# Patient Record
Sex: MALE | Race: WHITE | NOT HISPANIC OR LATINO
[De-identification: names, ages, dates, MRNs, and addresses within clinical notes are randomized per-mention and may not be internally consistent; named-entity substitution may affect disease eponyms.]

---

## 2018-09-14 ENCOUNTER — APPOINTMENT (OUTPATIENT)
Dept: ORTHOPEDIC SURGERY | Facility: CLINIC | Age: 83
End: 2018-09-14
Payer: MEDICARE

## 2018-09-14 DIAGNOSIS — Z78.9 OTHER SPECIFIED HEALTH STATUS: ICD-10-CM

## 2018-09-14 PROCEDURE — 73030 X-RAY EXAM OF SHOULDER: CPT | Mod: LT

## 2018-09-14 PROCEDURE — 99203 OFFICE O/P NEW LOW 30 MIN: CPT

## 2020-03-24 ENCOUNTER — APPOINTMENT (OUTPATIENT)
Dept: ORTHOPEDIC SURGERY | Facility: CLINIC | Age: 85
End: 2020-03-24

## 2020-05-14 ENCOUNTER — APPOINTMENT (OUTPATIENT)
Dept: ORTHOPEDIC SURGERY | Facility: CLINIC | Age: 85
End: 2020-05-14

## 2020-05-21 ENCOUNTER — APPOINTMENT (OUTPATIENT)
Dept: ORTHOPEDIC SURGERY | Facility: CLINIC | Age: 85
End: 2020-05-21
Payer: MEDICARE

## 2020-05-21 VITALS
BODY MASS INDEX: 26.68 KG/M2 | WEIGHT: 166 LBS | SYSTOLIC BLOOD PRESSURE: 161 MMHG | HEART RATE: 58 BPM | HEIGHT: 66 IN | DIASTOLIC BLOOD PRESSURE: 62 MMHG

## 2020-05-21 VITALS — TEMPERATURE: 98.9 F

## 2020-05-21 DIAGNOSIS — Z82.61 FAMILY HISTORY OF ARTHRITIS: ICD-10-CM

## 2020-05-21 DIAGNOSIS — M54.12 RADICULOPATHY, CERVICAL REGION: ICD-10-CM

## 2020-05-21 PROCEDURE — 72040 X-RAY EXAM NECK SPINE 2-3 VW: CPT

## 2020-05-21 PROCEDURE — 99214 OFFICE O/P EST MOD 30 MIN: CPT

## 2020-05-21 NOTE — PHYSICAL EXAM
[Poor Appearance] : well-appearing [Acute Distress] : not in acute distress [de-identified] : CONSTITUTIONAL: Patient is a very pleasant individual who is well-nourished and appears stated age. \par PSYCHIATRIC: Alert and oriented times three and in no apparent distress, and participates with orthopedic evaluation well.\par HEAD: Atraumatic and nonsyndromic in appearance.\par EENT: No thyromegaly, EOMI.\par RESPIRATORY: Respiratory rate is regular, not dyspneic on examination.\par LYMPHATICS: There is no cervical or axillary lymphadenopathy.\par INTEGUMENTARY: Skin is clean, dry, and intact about the bilateral upper extremities and cervical spine. \par VASCULAR: There is brisk capillary refill about the bilateral upper extremities and radial pulses are 2/4. \par NEUROLOGIC: Negative L'hirmitte, negative Spurling’s sign. There are no pathologic reflexes. There is no decrease in sensation of the bilateral upper extremities on Wartenberg pinwheel examination. Deep tendon reflexes are well-maintained at +2/4 of the bilateral upper extremities and are symmetric.\par MUSCULOSKELETAL: There is no visible muscular atrophy. Manual motor strength is well maintained in the bilateral upper extremities. Cervical range of motion is well maintained. The patient ambulates in a non-myelopathic manner. Normal secondary orthopaedic exam of bilateral shoulders, elbows and hands. Elbow flexion and extension, wrist extension, finger flexion and abduction are well maintained. \par \par subjective c/o b/l hand numbness, R>L. b/l trapezius and posterior cervical myositis, R>L. [de-identified] : X-ray of the cervical spine taken today shows age appropriate cervical spondylosis, for age 88, with C5-C6 with what appears to be an autofusion.

## 2020-05-21 NOTE — HISTORY OF PRESENT ILLNESS
[de-identified] : 88 year old M presents with cervical spine pain. He had quadruple bypass in 1980's and stents in the 1990's. He c/o intermittent b/l hand numbness / tingling. He also c/o trapezius pain. He takes Tylenol Arthritis, which does not provide much relief. No c/o weakness or being off-balance. [Ataxia] : no ataxia [Incontinence] : no incontinence [Loss of Dexterity] : good dexterity [Urinary Ret.] : no urinary retention

## 2020-05-21 NOTE — ADDENDUM
[FreeTextEntry1] : Documented by Sohan Fowler acting as a scribe for Dr. Masoud Rosenthal on 05/21/2020 . All medical record entries made by the Scribe were at my, Dr. Masoud Rosenthal, direction and personally dictated by me on 05/21/2020 . I have reviewed the chart and agree that the record accurately reflects my personal performance of the history, physical exam, assessment and plan. I have also personally directed, reviewed, and agreed with the chart.

## 2020-05-21 NOTE — DISCUSSION/SUMMARY
[de-identified] : I have recommended that the pt continue with a conservative treatment plan. Pt has been referred to physical therapy for decreased pain modalities, core strengthening modalities, soft tissue modalities, and physical modalities. A cervical MRI has been ordered and is medically necessary due to persistent pain and subjective c/o hand numbness. MRI will help guide treatment plan, possible surgical intervention vs injection therapy with pain management. The patient will follow up after the MRI results have been obtained / 1 month.

## 2020-06-23 ENCOUNTER — APPOINTMENT (OUTPATIENT)
Dept: ORTHOPEDIC SURGERY | Facility: CLINIC | Age: 85
End: 2020-06-23
Payer: MEDICARE

## 2020-06-23 VITALS
DIASTOLIC BLOOD PRESSURE: 71 MMHG | HEART RATE: 58 BPM | WEIGHT: 166 LBS | SYSTOLIC BLOOD PRESSURE: 152 MMHG | HEIGHT: 66 IN | BODY MASS INDEX: 26.68 KG/M2

## 2020-06-23 VITALS — TEMPERATURE: 97.1 F

## 2020-06-23 DIAGNOSIS — Z86.39 PERSONAL HISTORY OF OTHER ENDOCRINE, NUTRITIONAL AND METABOLIC DISEASE: ICD-10-CM

## 2020-06-23 DIAGNOSIS — M75.102 UNSPECIFIED ROTATOR CUFF TEAR OR RUPTURE OF LEFT SHOULDER, NOT SPECIFIED AS TRAUMATIC: ICD-10-CM

## 2020-06-23 DIAGNOSIS — M47.812 SPONDYLOSIS W/OUT MYELOPATHY OR RADICULOPATHY, CERVICAL REGION: ICD-10-CM

## 2020-06-23 DIAGNOSIS — M75.22 BICIPITAL TENDINITIS, LEFT SHOULDER: ICD-10-CM

## 2020-06-23 PROCEDURE — 99214 OFFICE O/P EST MOD 30 MIN: CPT

## 2020-06-23 NOTE — HISTORY OF PRESENT ILLNESS
[de-identified] : 88 year old M presents with cervical spine pain, MRI review. He states he is still in pain. He complains of a lump, probably a lipoma. He has low back pain when he wakes up in the morning, better after he gets up and starts moving. Hx of Melanoma. [Ataxia] : no ataxia [Incontinence] : no incontinence [Loss of Dexterity] : good dexterity [Urinary Ret.] : no urinary retention

## 2020-06-23 NOTE — PHYSICAL EXAM
[Poor Appearance] : well-appearing [Acute Distress] : not in acute distress [de-identified] : CONSTITUTIONAL: Patient is a very pleasant individual who is well-nourished and appears stated age. \par PSYCHIATRIC: Alert and oriented times three and in no apparent distress, and participates with orthopedic evaluation well.\par HEAD: Atraumatic and nonsyndromic in appearance.\par EENT: No thyromegaly, EOMI.\par RESPIRATORY: Respiratory rate is regular, not dyspneic on examination.\par LYMPHATICS: There is no cervical or axillary lymphadenopathy.\par INTEGUMENTARY: Skin is clean, dry, and intact about the bilateral upper extremities and cervical spine. \par VASCULAR: There is brisk capillary refill about the bilateral upper extremities and radial pulses are 2/4. \par NEUROLOGIC: Negative L'hirmitte, negative Spurling’s sign. There are no pathologic reflexes. There is no decrease in sensation of the bilateral upper extremities on Wartenberg pinwheel examination. Deep tendon reflexes are well-maintained at +2/4 of the bilateral upper extremities and are symmetric.\par MUSCULOSKELETAL: There is no visible muscular atrophy. Manual motor strength is well maintained in the bilateral upper extremities. Cervical range of motion is well maintained. The patient ambulates in a non-myelopathic manner. Normal secondary orthopaedic exam of bilateral shoulders, elbows and hands. Elbow flexion and extension, wrist extension, finger flexion and abduction are well maintained. \par \par cervical myositis. Mechanically orientated low back pain.  [de-identified] : MRI  from 5/29/2020 of the cervical spine shows age appropriate spondylosis. No evidence of myelopathy.

## 2020-06-23 NOTE — ADDENDUM
[FreeTextEntry1] : Documented by Sohan Fowler acting as a scribe for Dr. Masoud Rosenthal on 06/23/2020 . All medical record entries made by the Scribe were at my, Dr. Masoud Rosenthal, direction and personally dictated by me on 06/23/2020 . I have reviewed the chart and agree that the record accurately reflects my personal performance of the history, physical exam, assessment and plan. I have also personally directed, reviewed, and agreed with the chart.

## 2020-08-06 ENCOUNTER — APPOINTMENT (OUTPATIENT)
Dept: PULMONOLOGY | Facility: CLINIC | Age: 85
End: 2020-08-06
Payer: MEDICARE

## 2020-08-06 VITALS
HEART RATE: 65 BPM | SYSTOLIC BLOOD PRESSURE: 130 MMHG | BODY MASS INDEX: 29.43 KG/M2 | HEIGHT: 62.5 IN | WEIGHT: 164 LBS | OXYGEN SATURATION: 96 % | DIASTOLIC BLOOD PRESSURE: 70 MMHG

## 2020-08-06 VITALS — RESPIRATION RATE: 16 BRPM

## 2020-08-06 DIAGNOSIS — Z87.891 PERSONAL HISTORY OF NICOTINE DEPENDENCE: ICD-10-CM

## 2020-08-06 PROCEDURE — 99204 OFFICE O/P NEW MOD 45 MIN: CPT

## 2020-08-06 RX ORDER — DICLOFENAC SODIUM 10 MG/G
1 GEL TOPICAL DAILY
Qty: 1 | Refills: 0 | Status: DISCONTINUED | COMMUNITY
Start: 2020-06-03 | End: 2020-08-06

## 2020-08-06 NOTE — PHYSICAL EXAM
[No Acute Distress] : no acute distress [Normal Appearance] : normal appearance [Normal Oropharynx] : normal oropharynx [No Neck Mass] : no neck mass [Normal S1, S2] : normal s1, s2 [Normal Rate/Rhythm] : normal rate/rhythm [No Murmurs] : no murmurs [No Resp Distress] : no resp distress [Benign] : benign [No Abnormalities] : no abnormalities [Clear to Auscultation Bilaterally] : clear to auscultation bilaterally [Normal Gait] : normal gait [No Cyanosis] : no cyanosis [No Clubbing] : no clubbing [No Edema] : no edema [FROM] : FROM [Normal Color/ Pigmentation] : normal color/ pigmentation [No Focal Deficits] : no focal deficits [Oriented x3] : oriented x3 [Normal Affect] : normal affect

## 2020-08-06 NOTE — REASON FOR VISIT
[Consultation] : a consultation [Abnormal CXR/ Chest CT] : an abnormal CXR/ chest CT [Spouse] : spouse

## 2020-08-06 NOTE — CONSULT LETTER
[Dear  ___] : Dear  [unfilled], [Consult Letter:] : I had the pleasure of evaluating your patient, [unfilled]. [Please see my note below.] : Please see my note below. [Sincerely,] : Sincerely, [FreeTextEntry3] : Marnie Henry MD FCCP\par D-ABSM\par ABIM board certified in  Pulmonary diseases, Sleep medicine\par Internal medicine\par  [Consult Closing:] : Thank you very much for allowing me to participate in the care of this patient.  If you have any questions, please do not hesitate to contact me.

## 2020-08-06 NOTE — ASSESSMENT
[FreeTextEntry1] : The patient does not have a lung nodule. I believe the density that was seen on the chest x-ray was a projection of some bandlike atelectasis at the left base which may be a sequela of his coronary artery bypass surgery in the past. I reassured the patient regarding this. Followup here p.r.n.

## 2020-08-06 NOTE — HISTORY OF PRESENT ILLNESS
[TextBox_4] : The patient is an 88-year-old male who comes for evaluation of an abnormal chest x-ray. The patient feels well and is a nonsmoker. He had coronary artery bypass surgery 23 years ago. Her chest x-ray recently was performed and demonstrated a 13 mm left lung nodule. He had a followup CT scan done. There's been no shortness of breath cough or wheeze. Has been no fevers chills night sweats or weight loss.

## 2021-03-24 ENCOUNTER — EMERGENCY (EMERGENCY)
Facility: HOSPITAL | Age: 86
LOS: 1 days | Discharge: HOME | End: 2021-03-24
Attending: EMERGENCY MEDICINE
Payer: MEDICARE

## 2021-03-24 VITALS
RESPIRATION RATE: 20 BRPM | SYSTOLIC BLOOD PRESSURE: 166 MMHG | TEMPERATURE: 99 F | HEART RATE: 82 BPM | DIASTOLIC BLOOD PRESSURE: 73 MMHG | OXYGEN SATURATION: 97 %

## 2021-03-24 VITALS
HEIGHT: 67 IN | SYSTOLIC BLOOD PRESSURE: 157 MMHG | OXYGEN SATURATION: 95 % | RESPIRATION RATE: 20 BRPM | DIASTOLIC BLOOD PRESSURE: 58 MMHG | HEART RATE: 65 BPM | TEMPERATURE: 98 F

## 2021-03-24 DIAGNOSIS — Z95.1 PRESENCE OF AORTOCORONARY BYPASS GRAFT: Chronic | ICD-10-CM

## 2021-03-24 DIAGNOSIS — Z95.5 PRESENCE OF CORONARY ANGIOPLASTY IMPLANT AND GRAFT: Chronic | ICD-10-CM

## 2021-03-24 LAB
ALBUMIN SERPL ELPH-MCNC: 4.1 G/DL — SIGNIFICANT CHANGE UP (ref 3.3–5.2)
ALP SERPL-CCNC: 54 U/L — SIGNIFICANT CHANGE UP (ref 40–120)
ALT FLD-CCNC: 12 U/L — SIGNIFICANT CHANGE UP
ANION GAP SERPL CALC-SCNC: 14 MMOL/L — SIGNIFICANT CHANGE UP (ref 5–17)
APTT BLD: 28.4 SEC — SIGNIFICANT CHANGE UP (ref 27.5–35.5)
AST SERPL-CCNC: 16 U/L — SIGNIFICANT CHANGE UP
BASOPHILS # BLD AUTO: 0.01 K/UL — SIGNIFICANT CHANGE UP (ref 0–0.2)
BASOPHILS NFR BLD AUTO: 0.2 % — SIGNIFICANT CHANGE UP (ref 0–2)
BILIRUB SERPL-MCNC: 0.4 MG/DL — SIGNIFICANT CHANGE UP (ref 0.4–2)
BUN SERPL-MCNC: 14 MG/DL — SIGNIFICANT CHANGE UP (ref 8–20)
CALCIUM SERPL-MCNC: 9.3 MG/DL — SIGNIFICANT CHANGE UP (ref 8.6–10.2)
CHLORIDE SERPL-SCNC: 100 MMOL/L — SIGNIFICANT CHANGE UP (ref 98–107)
CO2 SERPL-SCNC: 22 MMOL/L — SIGNIFICANT CHANGE UP (ref 22–29)
CREAT SERPL-MCNC: 0.83 MG/DL — SIGNIFICANT CHANGE UP (ref 0.5–1.3)
CRP SERPL-MCNC: <4 MG/L — SIGNIFICANT CHANGE UP
D DIMER BLD IA.RAPID-MCNC: <150 NG/ML DDU — SIGNIFICANT CHANGE UP
EOSINOPHIL # BLD AUTO: 0.02 K/UL — SIGNIFICANT CHANGE UP (ref 0–0.5)
EOSINOPHIL NFR BLD AUTO: 0.3 % — SIGNIFICANT CHANGE UP (ref 0–6)
FERRITIN SERPL-MCNC: 107 NG/ML — SIGNIFICANT CHANGE UP (ref 30–400)
GLUCOSE BLDC GLUCOMTR-MCNC: 153 MG/DL — HIGH (ref 70–99)
GLUCOSE SERPL-MCNC: 126 MG/DL — HIGH (ref 70–99)
HCT VFR BLD CALC: 44.7 % — SIGNIFICANT CHANGE UP (ref 39–50)
HGB BLD-MCNC: 14.6 G/DL — SIGNIFICANT CHANGE UP (ref 13–17)
IMM GRANULOCYTES NFR BLD AUTO: 0.3 % — SIGNIFICANT CHANGE UP (ref 0–1.5)
INR BLD: 1.19 RATIO — HIGH (ref 0.88–1.16)
LDH SERPL L TO P-CCNC: 170 U/L — SIGNIFICANT CHANGE UP (ref 98–192)
LYMPHOCYTES # BLD AUTO: 1.77 K/UL — SIGNIFICANT CHANGE UP (ref 1–3.3)
LYMPHOCYTES # BLD AUTO: 30.3 % — SIGNIFICANT CHANGE UP (ref 13–44)
MCHC RBC-ENTMCNC: 29.6 PG — SIGNIFICANT CHANGE UP (ref 27–34)
MCHC RBC-ENTMCNC: 32.7 GM/DL — SIGNIFICANT CHANGE UP (ref 32–36)
MCV RBC AUTO: 90.7 FL — SIGNIFICANT CHANGE UP (ref 80–100)
MONOCYTES # BLD AUTO: 0.53 K/UL — SIGNIFICANT CHANGE UP (ref 0–0.9)
MONOCYTES NFR BLD AUTO: 9.1 % — SIGNIFICANT CHANGE UP (ref 2–14)
NEUTROPHILS # BLD AUTO: 3.49 K/UL — SIGNIFICANT CHANGE UP (ref 1.8–7.4)
NEUTROPHILS NFR BLD AUTO: 59.8 % — SIGNIFICANT CHANGE UP (ref 43–77)
PLATELET # BLD AUTO: 211 K/UL — SIGNIFICANT CHANGE UP (ref 150–400)
POTASSIUM SERPL-MCNC: 3.9 MMOL/L — SIGNIFICANT CHANGE UP (ref 3.5–5.3)
POTASSIUM SERPL-SCNC: 3.9 MMOL/L — SIGNIFICANT CHANGE UP (ref 3.5–5.3)
PROCALCITONIN SERPL-MCNC: 0.05 NG/ML — SIGNIFICANT CHANGE UP (ref 0.02–0.1)
PROT SERPL-MCNC: 7.4 G/DL — SIGNIFICANT CHANGE UP (ref 6.6–8.7)
PROTHROM AB SERPL-ACNC: 13.7 SEC — HIGH (ref 10.6–13.6)
RBC # BLD: 4.93 M/UL — SIGNIFICANT CHANGE UP (ref 4.2–5.8)
RBC # FLD: 13.2 % — SIGNIFICANT CHANGE UP (ref 10.3–14.5)
SARS-COV-2 RNA SPEC QL NAA+PROBE: DETECTED
SODIUM SERPL-SCNC: 135 MMOL/L — SIGNIFICANT CHANGE UP (ref 135–145)
TROPONIN T SERPL-MCNC: <0.01 NG/ML — SIGNIFICANT CHANGE UP (ref 0–0.06)
WBC # BLD: 5.84 K/UL — SIGNIFICANT CHANGE UP (ref 3.8–10.5)
WBC # FLD AUTO: 5.84 K/UL — SIGNIFICANT CHANGE UP (ref 3.8–10.5)

## 2021-03-24 PROCEDURE — 36415 COLL VENOUS BLD VENIPUNCTURE: CPT

## 2021-03-24 PROCEDURE — 86140 C-REACTIVE PROTEIN: CPT

## 2021-03-24 PROCEDURE — 99218: CPT

## 2021-03-24 PROCEDURE — U0003: CPT

## 2021-03-24 PROCEDURE — 83615 LACTATE (LD) (LDH) ENZYME: CPT

## 2021-03-24 PROCEDURE — 93010 ELECTROCARDIOGRAM REPORT: CPT

## 2021-03-24 PROCEDURE — U0005: CPT

## 2021-03-24 PROCEDURE — 84145 PROCALCITONIN (PCT): CPT

## 2021-03-24 PROCEDURE — 94640 AIRWAY INHALATION TREATMENT: CPT

## 2021-03-24 PROCEDURE — 85610 PROTHROMBIN TIME: CPT

## 2021-03-24 PROCEDURE — 80053 COMPREHEN METABOLIC PANEL: CPT

## 2021-03-24 PROCEDURE — 83880 ASSAY OF NATRIURETIC PEPTIDE: CPT

## 2021-03-24 PROCEDURE — 93005 ELECTROCARDIOGRAM TRACING: CPT

## 2021-03-24 PROCEDURE — 85025 COMPLETE CBC W/AUTO DIFF WBC: CPT

## 2021-03-24 PROCEDURE — 85379 FIBRIN DEGRADATION QUANT: CPT

## 2021-03-24 PROCEDURE — 84484 ASSAY OF TROPONIN QUANT: CPT

## 2021-03-24 PROCEDURE — 71045 X-RAY EXAM CHEST 1 VIEW: CPT

## 2021-03-24 PROCEDURE — 99285 EMERGENCY DEPT VISIT HI MDM: CPT | Mod: 25

## 2021-03-24 PROCEDURE — 96361 HYDRATE IV INFUSION ADD-ON: CPT

## 2021-03-24 PROCEDURE — 96374 THER/PROPH/DIAG INJ IV PUSH: CPT

## 2021-03-24 PROCEDURE — 71045 X-RAY EXAM CHEST 1 VIEW: CPT | Mod: 26

## 2021-03-24 PROCEDURE — 96375 TX/PRO/DX INJ NEW DRUG ADDON: CPT

## 2021-03-24 PROCEDURE — 82962 GLUCOSE BLOOD TEST: CPT

## 2021-03-24 PROCEDURE — G0378: CPT

## 2021-03-24 PROCEDURE — 82728 ASSAY OF FERRITIN: CPT

## 2021-03-24 PROCEDURE — 85730 THROMBOPLASTIN TIME PARTIAL: CPT

## 2021-03-24 RX ORDER — DEXTROSE 50 % IN WATER 50 %
15 SYRINGE (ML) INTRAVENOUS ONCE
Refills: 0 | Status: DISCONTINUED | OUTPATIENT
Start: 2021-03-24 | End: 2021-03-28

## 2021-03-24 RX ORDER — SODIUM CHLORIDE 9 MG/ML
1000 INJECTION, SOLUTION INTRAVENOUS
Refills: 0 | Status: DISCONTINUED | OUTPATIENT
Start: 2021-03-24 | End: 2021-03-28

## 2021-03-24 RX ORDER — ATORVASTATIN CALCIUM 80 MG/1
20 TABLET, FILM COATED ORAL AT BEDTIME
Refills: 0 | Status: DISCONTINUED | OUTPATIENT
Start: 2021-03-24 | End: 2021-03-28

## 2021-03-24 RX ORDER — BRIMONIDINE TARTRATE 2 MG/MG
1 SOLUTION/ DROPS OPHTHALMIC EVERY 12 HOURS
Refills: 0 | Status: DISCONTINUED | OUTPATIENT
Start: 2021-03-24 | End: 2021-03-28

## 2021-03-24 RX ORDER — ASPIRIN/CALCIUM CARB/MAGNESIUM 324 MG
81 TABLET ORAL DAILY
Refills: 0 | Status: DISCONTINUED | OUTPATIENT
Start: 2021-03-24 | End: 2021-03-28

## 2021-03-24 RX ORDER — INSULIN LISPRO 100/ML
VIAL (ML) SUBCUTANEOUS
Refills: 0 | Status: DISCONTINUED | OUTPATIENT
Start: 2021-03-24 | End: 2021-03-28

## 2021-03-24 RX ORDER — TIMOLOL 0.5 %
1 DROPS OPHTHALMIC (EYE) DAILY
Refills: 0 | Status: DISCONTINUED | OUTPATIENT
Start: 2021-03-24 | End: 2021-03-28

## 2021-03-24 RX ORDER — GLUCAGON INJECTION, SOLUTION 0.5 MG/.1ML
1 INJECTION, SOLUTION SUBCUTANEOUS ONCE
Refills: 0 | Status: DISCONTINUED | OUTPATIENT
Start: 2021-03-24 | End: 2021-03-28

## 2021-03-24 RX ORDER — DEXAMETHASONE 0.5 MG/5ML
6 ELIXIR ORAL ONCE
Refills: 0 | Status: COMPLETED | OUTPATIENT
Start: 2021-03-24 | End: 2021-03-24

## 2021-03-24 RX ORDER — ACETAMINOPHEN 500 MG
650 TABLET ORAL EVERY 6 HOURS
Refills: 0 | Status: DISCONTINUED | OUTPATIENT
Start: 2021-03-24 | End: 2021-03-28

## 2021-03-24 RX ORDER — DEXTROSE 50 % IN WATER 50 %
25 SYRINGE (ML) INTRAVENOUS ONCE
Refills: 0 | Status: DISCONTINUED | OUTPATIENT
Start: 2021-03-24 | End: 2021-03-28

## 2021-03-24 RX ORDER — DEXTROSE 50 % IN WATER 50 %
12.5 SYRINGE (ML) INTRAVENOUS ONCE
Refills: 0 | Status: DISCONTINUED | OUTPATIENT
Start: 2021-03-24 | End: 2021-03-28

## 2021-03-24 RX ORDER — ALBUTEROL 90 UG/1
2 AEROSOL, METERED ORAL ONCE
Refills: 0 | Status: COMPLETED | OUTPATIENT
Start: 2021-03-24 | End: 2021-03-24

## 2021-03-24 RX ORDER — GUAIFENESIN/DEXTROMETHORPHAN 600MG-30MG
10 TABLET, EXTENDED RELEASE 12 HR ORAL ONCE
Refills: 0 | Status: COMPLETED | OUTPATIENT
Start: 2021-03-24 | End: 2021-03-24

## 2021-03-24 RX ORDER — ASPIRIN/CALCIUM CARB/MAGNESIUM 324 MG
325 TABLET ORAL ONCE
Refills: 0 | Status: COMPLETED | OUTPATIENT
Start: 2021-03-24 | End: 2021-03-24

## 2021-03-24 RX ORDER — LOSARTAN POTASSIUM 100 MG/1
50 TABLET, FILM COATED ORAL DAILY
Refills: 0 | Status: DISCONTINUED | OUTPATIENT
Start: 2021-03-24 | End: 2021-03-28

## 2021-03-24 RX ORDER — SODIUM CHLORIDE 9 MG/ML
1000 INJECTION INTRAMUSCULAR; INTRAVENOUS; SUBCUTANEOUS ONCE
Refills: 0 | Status: COMPLETED | OUTPATIENT
Start: 2021-03-24 | End: 2021-03-24

## 2021-03-24 RX ORDER — ACETAMINOPHEN 500 MG
650 TABLET ORAL ONCE
Refills: 0 | Status: COMPLETED | OUTPATIENT
Start: 2021-03-24 | End: 2021-03-24

## 2021-03-24 RX ORDER — MORPHINE SULFATE 50 MG/1
2 CAPSULE, EXTENDED RELEASE ORAL ONCE
Refills: 0 | Status: DISCONTINUED | OUTPATIENT
Start: 2021-03-24 | End: 2021-03-24

## 2021-03-24 RX ADMIN — MORPHINE SULFATE 2 MILLIGRAM(S): 50 CAPSULE, EXTENDED RELEASE ORAL at 16:27

## 2021-03-24 RX ADMIN — MORPHINE SULFATE 2 MILLIGRAM(S): 50 CAPSULE, EXTENDED RELEASE ORAL at 14:54

## 2021-03-24 RX ADMIN — SODIUM CHLORIDE 1000 MILLILITER(S): 9 INJECTION INTRAMUSCULAR; INTRAVENOUS; SUBCUTANEOUS at 12:00

## 2021-03-24 RX ADMIN — Medication 10 MILLILITER(S): at 12:14

## 2021-03-24 RX ADMIN — SODIUM CHLORIDE 1000 MILLILITER(S): 9 INJECTION INTRAMUSCULAR; INTRAVENOUS; SUBCUTANEOUS at 13:00

## 2021-03-24 RX ADMIN — Medication 6 MILLIGRAM(S): at 12:00

## 2021-03-24 RX ADMIN — Medication 1 DROP(S): at 20:09

## 2021-03-24 RX ADMIN — Medication 650 MILLIGRAM(S): at 12:00

## 2021-03-24 RX ADMIN — ALBUTEROL 2 PUFF(S): 90 AEROSOL, METERED ORAL at 12:14

## 2021-03-24 RX ADMIN — Medication 325 MILLIGRAM(S): at 14:54

## 2021-03-24 RX ADMIN — Medication 650 MILLIGRAM(S): at 14:11

## 2021-03-24 NOTE — ED CDU PROVIDER DISPOSITION NOTE - ATTENDING CONTRIBUTION TO CARE
I, Grace Kebede, performed a face to face bedside interview with this patient regarding history of present illness, review of symptoms and relevant past medical, social and family history.  I completed an independent physical examination. Medical decision making, follow-up on ordered tests (ie labs, radiologic studies) and re-evaluation of the patient's status has been communicated to the ACP.  Disposition of the patient will be based on test outcome and response to ED interventions.     patient seen and cleared by cards had  recent echo and stress. trop x3 negative, age adjusted d-dimer negative. will d/c

## 2021-03-24 NOTE — ED CDU PROVIDER DISPOSITION NOTE - PATIENT PORTAL LINK FT
You can access the FollowMyHealth Patient Portal offered by [facility] by registering at the following website: [email or web address]. By joining [facility]’s FollowMyHealth portal, you will also be able to view your health information using other applications (apps) compatible with our system.

## 2021-03-24 NOTE — ED CDU PROVIDER DISPOSITION NOTE - CLINICAL COURSE
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 Covid positive as of 3/15, tested positive again at urgent care 3/21. Pt put in obs for serial enzymes and cardiology eval. Trop negative x 3. Pt seen by Dr. Mackey while in obs, states pt is cleared from cardiology perspective, can f/u in office in 2 weeks once covid symptoms resolve. I discussed all results and plan with pt and pt's daughter Destiny via phone who understand and agree. Pt given copy of all results. Return precautions discussed, stable for dc home. Ambulance arranged for transport home.

## 2021-03-24 NOTE — ED PROVIDER NOTE - OBJECTIVE STATEMENT
The patient is a 88 year old male presents with SOB, cough and FINNEGAN with recent history of COVID-19 The patient is a 88 year old male presents with SOB, cough and FINNEGAN with recent history of COVID-19.  The patient has history of CAD with CABG and HTN

## 2021-03-24 NOTE — ED CDU PROVIDER DISPOSITION NOTE - NSFOLLOWUPINSTRUCTIONS_ED_ALL_ED_FT
Follow up with the outpatient detox resources we provided to you   - Follow up with your doctor within 2-3 days.   - Return to the ED for any new or worsening symptoms.   - Please follow-up with [facility], Dr. Mackey within 2 weeks for further evaluation  - Continue all prescribed medications as directed    Shortness of breath    Shortness of breath (dyspnea) means you have trouble breathing and could indicate a medical problem. Causes include lung disease, heart disease, low amount of red blood cells (anemia), poor physical fitness, being overweight, smoking, etc. Your health care provider today may not be able to find a cause for your shortness of breath after your exam. In this case, it is important to have a follow-up exam with your primary care physician as instructed. If medicines were prescribed, take them as directed for the full length of time directed. Refrain from tobacco products.    SEEK IMMEDIATE MEDICAL CARE IF YOU HAVE ANY OF THE FOLLOWING SYMPTOMS: worsening shortness of breath, chest pain, back pain, abdominal pain, fever, coughing up blood, lightheadedness/dizziness.

## 2021-03-24 NOTE — ED ADULT TRIAGE NOTE - CHIEF COMPLAINT QUOTE
pt BIBA from home a&ox3, no acute distress, breaths even and unlabored c/o worsening sob and chest tightness since last night. tested +covid 11 days ago. presents with spo2 95% on RA.

## 2021-03-24 NOTE — ED PROVIDER NOTE - CLINICAL SUMMARY MEDICAL DECISION MAKING FREE TEXT BOX
The patient presents with FINNEGAN and EKG change and will place in ED observation and called [facility]

## 2021-03-24 NOTE — ED ADULT NURSE REASSESSMENT NOTE - NS ED NURSE REASSESS COMMENT FT1
Late Note  Pt denies cough, denies fever. Pt was given staff personal cell phones multiple times and spoke with family. Pt c/o stretcher being uncomfortable and was made comfortable, given pillow, numerous urinals, needs met and safety maintained.

## 2021-03-24 NOTE — CONSULT NOTE ADULT - SUBJECTIVE AND OBJECTIVE BOX
[facility], [facility]                                                    [address], [address], [place] [address]                                                         PHONE: [phone]    FAX: [phone] [address], [address], [place] [address]                                                 PHONE: [phone]    FAX: [phone]  *******************************************************************************    Reason for Consult: Palpitations    HPI:  CRISTÓBAL YEE is a 88y Male with h/o CAD s/p CABG '95 with subsequent PCI/stent RCA with patent LIMA->LAD & occluded ANTON->RCA at that time, normal LV fxn, prolonged 1st degree AVB, ascending thoracic aortic aneurysm, PVD with moderate carotid stenosis, DM, HTN, HL presents with chest pain & SOB.  Patient reports recent symptoms consistent with his yearly bronchitis.  He went to urgent care and had a negative CXR but was called 2 days ago that he was COVID positive.  Today he got lightheaded upon getting up quickly.  He then developed bilateral chest tightness beneath his breasts followed by some SOB so he came to the ER.  He is currently chest pain free.  No current SOB, palpitations, LH, syncope, orthopnea, PND, LE edema.  No fever, chlls or cough.    PAST MEDICAL & SURGICAL HISTORY:  MI, old    Diabetes    High cholesterol    History of coronary artery stent placement    S/P CABG (coronary artery bypass graft)        No Known Allergies      MEDICATIONS  (STANDING):  aspirin enteric coated 81 milliGRAM(s) Oral daily  atorvastatin 20 milliGRAM(s) Oral at bedtime  brimonidine 0.2% Ophthalmic Solution 1 Drop(s) Both EYES every 12 hours  dextrose 40% Gel 15 Gram(s) Oral once  dextrose 5%. 1000 milliLiter(s) (50 mL/Hr) IV Continuous <Continuous>  dextrose 5%. 1000 milliLiter(s) (100 mL/Hr) IV Continuous <Continuous>  dextrose 50% Injectable 25 Gram(s) IV Push once  dextrose 50% Injectable 12.5 Gram(s) IV Push once  dextrose 50% Injectable 25 Gram(s) IV Push once  glucagon  Injectable 1 milliGRAM(s) IntraMuscular once  insulin lispro (ADMELOG) corrective regimen sliding scale   SubCutaneous three times a day before meals  losartan 50 milliGRAM(s) Oral daily  timolol 0.5% Solution 1 Drop(s) Both EYES daily    MEDICATIONS  (PRN):  acetaminophen   Tablet .. 650 milliGRAM(s) Oral every 6 hours PRN Temp greater or equal to 38C (100.4F), Moderate Pain (4 - 6)      Social History: no active tobacco (very distant), no EtOH / IVDA    Family History: negative for early heart disease    ROS: As noted above, otherwise unremarkable.    Vital Signs Last 24 Hrs  T(C): 36.7 (24 Mar 2021 10:19), Max: 36.7 (24 Mar 2021 10:19)  T(F): 98.1 (24 Mar 2021 10:19), Max: 98.1 (24 Mar 2021 10:19)  HR: 67 (24 Mar 2021 15:07) (65 - 67)  BP: 177/79 (24 Mar 2021 15:07) (157/58 - 177/79)  BP(mean): --  RR: 20 (24 Mar 2021 15:07) (20 - 20)  SpO2: 97% (24 Mar 2021 15:07) (95% - 97%)    I&O's Detail    I&O's Summary          PHYSICAL EXAM:  General: Appears well developed, well nourished, no acute distress  HEENT: Head: normocephalic, atraumatic  Eyes: Pupils equal and reactive  Neck: Supple, no carotid bruit, no JVD, no HJR  CARDIOVASCULAR: Normal S1 and S2, no murmur, rub, or gallop  LUNGS: Clear to auscultation bilaterally, no rales, rhonchi or wheeze  ABDOMEN: Soft, nontender, non-distended, positive bowel sounds, no mass or bruit  EXTREMITIES: No edema, distal pulses WNL  SKIN: Warm and dry with normal turgor  NEURO: Alert & oriented x 3, grossly intact  PSYCH: normal mood and affect    LABS:                        14.6   5.84  )-----------( 211      ( 24 Mar 2021 12:25 )             44.7     03-24    135  |  100  |  14.0  ----------------------------<  126<H>  3.9   |  22.0  |  0.83    Ca    9.3      24 Mar 2021 12:25    TPro  7.4  /  Alb  4.1  /  TBili  0.4  /  DBili  x   /  AST  16  /  ALT  12  /  AlkPhos  54  03-24    CARDIAC MARKERS ( 24 Mar 2021 15:23 )  x     / <0.01 ng/mL / x     / x     / x      CARDIAC MARKERS ( 24 Mar 2021 12:25 )  x     / <0.01 ng/mL / x     / x     / x                RADIOLOGY & ADDITIONAL STUDIES:    CXR (3/24/21): no acute cardiopulmonary disease process, cardiomegaly    ECG: NSR @ 88 bpm, prolonged 1st degree AVB (296 ms), LAFB, PRWP, IWMI of YAZAN, NSRA    ECHO (8/7/20): normal LV fxn (EF 50-55%), basal inferolateral segment is akinetic & mid inferolateral segment is hypokinetic, diastolic dysfxn, moderate asymmetric LVH, dilatation of aortic root 4.8 cm & ascending aorta 4.8 cm, mild AR, mild MR, mild FL, mild TR, moderate pulmonary HTN    CAROTID DUPLEX SCAN (8/7/20): moderate 50-65% stenosis R & L ICA    PERSANTINE NUCLEAR STRESS TEST (2/8/20): medium fixed defect involving basal to mid inferior wall & basal inferolateral wall of mild-moderate intensity c/w prior infarction with no significant residual ischemia, mild hypokinesis of inferoposterolateral wall with remaining walls chemo effectively, EF 53% (no change compared to prior study)      Assessment and Plan:  In summary, CRISTÓBAL YEE is a 88M a/w atypical chest pain (troponins negative) & SOB now resolved, + COVID-19 as of 3/21/21, h/o CAD s/p CABG '95 with subsequent PCI/stent RCA with patent LIMA->LAD & occluded ANTON->RCA at that time, normal LV fxn, 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  Prior Persantine Nuclear stress test 2/8/20 demonstrated a medium fixed defect involving basal to mid inferior wall & basal inferolateral wall of mild-moderate intensity c/w prior infarction with no significant residual ischemia, mild hypokinesis of inferoposterolateral wall with remaining walls chemo effectively, EF 53% (no change compared to prior study).  Can repeat as an outpatient.  - Echocardiogram 8/7/20 demonstrated normal LV fxn (EF 50-55%), basal inferolateral segment is akinetic & mid inferolateral segment is hypokinetic, diastolic dysfxn, moderate asymmetric LVH, dilatation of aortic root 4.8 cm & ascending aorta 4.8 cm, mild AR, mild MR, mild FL, mild TR, moderate pulmonary HTN.  Can repeat as an outpatient.  - Carotid duplex scan 8/7/20 demonstrated moderate 50-65% stenosis R & L ICA  - Rhythm/hemodynamics stable = continue chronic anti-HTN medication (Losartan 50 daily) for now and titrate PRN  - Continue ASA 81 & Simvastatin 80 daily  - Management of COVID-19 per medicine  - Okay for discharge from a cardiac perspective with outpatient follow-up at [facility] in 2 weeks (after recovered from COVID).  Risks, benefits & alternatives discussed with the patient who demonstrates clear understanding and agrees with the current treatment plan.  Please call with any cardiovascular questions/issues.    We will follow with you.  Thank you for allowing me to participate in the care of your patient.      Sincerely,    Denny Mackey MD

## 2021-03-24 NOTE — ED ADULT NURSE NOTE - OBJECTIVE STATEMENT
88 M, nad, alert and oriented x 3 but poor historian reports being sent for getting call for positive Covid test. Pt in no acute distress, breathing even and unlabored, SpO2 97% on room air, full physical assessment deferred to physician, fall precautions in place, will monitor.

## 2021-03-24 NOTE — ED CDU PROVIDER INITIAL DAY NOTE - OBJECTIVE STATEMENT
87yo M with CABG with stents, HTN, HLD, DM today had episode of chest pressure b/l lower chest region nonradiating associated with SOB worse with exertion. no leg swelling.  sx lasted 'awhile' until he got to the hospital. asymptomatic since. patient follows with [facility]. last TTE/stress >1 year ago, saw cardiology last month and told them he had tingling/cold rt hand at times and they did an 'mri of everything' to 'look for blockages' and said it was fine. dx with covid monday on PCR. has had mild intermittent cough which he states every year gets 'bronchitis' so went to urgent care over weekend and had normal CXR negative rapid covid and they called him monday that test was positive

## 2021-03-24 NOTE — ED CDU PROVIDER INITIAL DAY NOTE - MEDICAL DECISION MAKING DETAILS
patient with sudden onset chest tightness/sob, worse with exertion. EKG with new st-t changes V5/V6. will place in obs for serial trop. tte. pending [place] consult

## 2021-03-24 NOTE — ED CDU PROVIDER DISPOSITION NOTE - CARE PROVIDER_API CALL
Denny Mackey)  Cardiology; Internal Medicine  [address]  [place] [address]  Phone: [phone]  Fax: [phone]  Follow Up Time:

## 2021-04-06 ENCOUNTER — APPOINTMENT (OUTPATIENT)
Dept: PULMONOLOGY | Facility: CLINIC | Age: 86
End: 2021-04-06
Payer: MEDICARE

## 2021-04-06 VITALS — OXYGEN SATURATION: 97 % | HEART RATE: 67 BPM | TEMPERATURE: 97.4 F

## 2021-04-06 VITALS — SYSTOLIC BLOOD PRESSURE: 128 MMHG | WEIGHT: 143 LBS | BODY MASS INDEX: 25.74 KG/M2 | DIASTOLIC BLOOD PRESSURE: 66 MMHG

## 2021-04-06 DIAGNOSIS — R93.89 ABNORMAL FINDINGS ON DIAGNOSTIC IMAGING OF OTHER SPECIFIED BODY STRUCTURES: ICD-10-CM

## 2021-04-06 DIAGNOSIS — Z23 ENCOUNTER FOR IMMUNIZATION: ICD-10-CM

## 2021-04-06 DIAGNOSIS — U07.1 COVID-19: ICD-10-CM

## 2021-04-06 PROCEDURE — 99214 OFFICE O/P EST MOD 30 MIN: CPT

## 2021-04-06 PROCEDURE — 99072 ADDL SUPL MATRL&STAF TM PHE: CPT

## 2021-09-05 ENCOUNTER — INPATIENT (INPATIENT)
Facility: HOSPITAL | Age: 86
LOS: 0 days | Discharge: HOME | DRG: 281 | End: 2021-09-06
Attending: INTERNAL MEDICINE | Admitting: STUDENT IN AN ORGANIZED HEALTH CARE EDUCATION/TRAINING PROGRAM
Payer: MEDICARE

## 2021-09-05 VITALS
HEIGHT: 67 IN | DIASTOLIC BLOOD PRESSURE: 67 MMHG | OXYGEN SATURATION: 95 % | HEART RATE: 76 BPM | WEIGHT: 154.98 LBS | RESPIRATION RATE: 18 BRPM | TEMPERATURE: 99 F | SYSTOLIC BLOOD PRESSURE: 171 MMHG

## 2021-09-05 DIAGNOSIS — Z95.1 PRESENCE OF AORTOCORONARY BYPASS GRAFT: Chronic | ICD-10-CM

## 2021-09-05 DIAGNOSIS — I21.4 NON-ST ELEVATION (NSTEMI) MYOCARDIAL INFARCTION: ICD-10-CM

## 2021-09-05 DIAGNOSIS — Z95.5 PRESENCE OF CORONARY ANGIOPLASTY IMPLANT AND GRAFT: Chronic | ICD-10-CM

## 2021-09-05 LAB
ALBUMIN SERPL ELPH-MCNC: 4.2 G/DL — SIGNIFICANT CHANGE UP (ref 3.3–5.2)
ALP SERPL-CCNC: 66 U/L — SIGNIFICANT CHANGE UP (ref 40–120)
ALT FLD-CCNC: 26 U/L — SIGNIFICANT CHANGE UP
ANION GAP SERPL CALC-SCNC: 12 MMOL/L — SIGNIFICANT CHANGE UP (ref 5–17)
APTT BLD: 28.5 SEC — SIGNIFICANT CHANGE UP (ref 27.5–35.5)
AST SERPL-CCNC: 26 U/L — SIGNIFICANT CHANGE UP
BASOPHILS # BLD AUTO: 0.03 K/UL — SIGNIFICANT CHANGE UP (ref 0–0.2)
BASOPHILS NFR BLD AUTO: 0.4 % — SIGNIFICANT CHANGE UP (ref 0–2)
BILIRUB SERPL-MCNC: 0.5 MG/DL — SIGNIFICANT CHANGE UP (ref 0.4–2)
BUN SERPL-MCNC: 22.2 MG/DL — HIGH (ref 8–20)
CALCIUM SERPL-MCNC: 9.8 MG/DL — SIGNIFICANT CHANGE UP (ref 8.6–10.2)
CHLORIDE SERPL-SCNC: 102 MMOL/L — SIGNIFICANT CHANGE UP (ref 98–107)
CO2 SERPL-SCNC: 23 MMOL/L — SIGNIFICANT CHANGE UP (ref 22–29)
CREAT SERPL-MCNC: 0.71 MG/DL — SIGNIFICANT CHANGE UP (ref 0.5–1.3)
EOSINOPHIL # BLD AUTO: 0.1 K/UL — SIGNIFICANT CHANGE UP (ref 0–0.5)
EOSINOPHIL NFR BLD AUTO: 1.2 % — SIGNIFICANT CHANGE UP (ref 0–6)
GLUCOSE BLDC GLUCOMTR-MCNC: 183 MG/DL — HIGH (ref 70–99)
GLUCOSE SERPL-MCNC: 147 MG/DL — HIGH (ref 70–99)
HCT VFR BLD CALC: 42.3 % — SIGNIFICANT CHANGE UP (ref 39–50)
HGB BLD-MCNC: 14.1 G/DL — SIGNIFICANT CHANGE UP (ref 13–17)
IMM GRANULOCYTES NFR BLD AUTO: 0.2 % — SIGNIFICANT CHANGE UP (ref 0–1.5)
INR BLD: 1.1 RATIO — SIGNIFICANT CHANGE UP (ref 0.88–1.16)
LIDOCAIN IGE QN: 31 U/L — SIGNIFICANT CHANGE UP (ref 22–51)
LYMPHOCYTES # BLD AUTO: 2.15 K/UL — SIGNIFICANT CHANGE UP (ref 1–3.3)
LYMPHOCYTES # BLD AUTO: 25.5 % — SIGNIFICANT CHANGE UP (ref 13–44)
MAGNESIUM SERPL-MCNC: 1.9 MG/DL — SIGNIFICANT CHANGE UP (ref 1.8–2.6)
MCHC RBC-ENTMCNC: 31.1 PG — SIGNIFICANT CHANGE UP (ref 27–34)
MCHC RBC-ENTMCNC: 33.3 GM/DL — SIGNIFICANT CHANGE UP (ref 32–36)
MCV RBC AUTO: 93.4 FL — SIGNIFICANT CHANGE UP (ref 80–100)
MONOCYTES # BLD AUTO: 0.57 K/UL — SIGNIFICANT CHANGE UP (ref 0–0.9)
MONOCYTES NFR BLD AUTO: 6.8 % — SIGNIFICANT CHANGE UP (ref 2–14)
NEUTROPHILS # BLD AUTO: 5.57 K/UL — SIGNIFICANT CHANGE UP (ref 1.8–7.4)
NEUTROPHILS NFR BLD AUTO: 65.9 % — SIGNIFICANT CHANGE UP (ref 43–77)
NT-PROBNP SERPL-SCNC: 863 PG/ML — HIGH (ref 0–300)
PLATELET # BLD AUTO: 188 K/UL — SIGNIFICANT CHANGE UP (ref 150–400)
POTASSIUM SERPL-MCNC: 4.5 MMOL/L — SIGNIFICANT CHANGE UP (ref 3.5–5.3)
POTASSIUM SERPL-SCNC: 4.5 MMOL/L — SIGNIFICANT CHANGE UP (ref 3.5–5.3)
PROT SERPL-MCNC: 7.6 G/DL — SIGNIFICANT CHANGE UP (ref 6.6–8.7)
PROTHROM AB SERPL-ACNC: 12.7 SEC — SIGNIFICANT CHANGE UP (ref 10.6–13.6)
RBC # BLD: 4.53 M/UL — SIGNIFICANT CHANGE UP (ref 4.2–5.8)
RBC # FLD: 13.4 % — SIGNIFICANT CHANGE UP (ref 10.3–14.5)
SARS-COV-2 RNA SPEC QL NAA+PROBE: SIGNIFICANT CHANGE UP
SODIUM SERPL-SCNC: 137 MMOL/L — SIGNIFICANT CHANGE UP (ref 135–145)
TROPONIN T SERPL-MCNC: 0.02 NG/ML — SIGNIFICANT CHANGE UP (ref 0–0.06)
TROPONIN T SERPL-MCNC: 0.13 NG/ML — HIGH (ref 0–0.06)
TROPONIN T SERPL-MCNC: 0.16 NG/ML — HIGH (ref 0–0.06)
TROPONIN T SERPL-MCNC: 0.38 NG/ML — HIGH (ref 0–0.06)
WBC # BLD: 8.44 K/UL — SIGNIFICANT CHANGE UP (ref 3.8–10.5)
WBC # FLD AUTO: 8.44 K/UL — SIGNIFICANT CHANGE UP (ref 3.8–10.5)

## 2021-09-05 PROCEDURE — 93010 ELECTROCARDIOGRAM REPORT: CPT

## 2021-09-05 PROCEDURE — 99285 EMERGENCY DEPT VISIT HI MDM: CPT

## 2021-09-05 PROCEDURE — 71046 X-RAY EXAM CHEST 2 VIEWS: CPT | Mod: 26

## 2021-09-05 PROCEDURE — 99223 1ST HOSP IP/OBS HIGH 75: CPT

## 2021-09-05 RX ORDER — LANOLIN ALCOHOL/MO/W.PET/CERES
3 CREAM (GRAM) TOPICAL AT BEDTIME
Refills: 0 | Status: DISCONTINUED | OUTPATIENT
Start: 2021-09-05 | End: 2021-09-06

## 2021-09-05 RX ORDER — HEPARIN SODIUM 5000 [USP'U]/ML
4100 INJECTION INTRAVENOUS; SUBCUTANEOUS EVERY 6 HOURS
Refills: 0 | Status: DISCONTINUED | OUTPATIENT
Start: 2021-09-05 | End: 2021-09-06

## 2021-09-05 RX ORDER — SODIUM CHLORIDE 9 MG/ML
1000 INJECTION, SOLUTION INTRAVENOUS
Refills: 0 | Status: DISCONTINUED | OUTPATIENT
Start: 2021-09-05 | End: 2021-09-06

## 2021-09-05 RX ORDER — DEXTROSE 50 % IN WATER 50 %
25 SYRINGE (ML) INTRAVENOUS ONCE
Refills: 0 | Status: DISCONTINUED | OUTPATIENT
Start: 2021-09-05 | End: 2021-09-06

## 2021-09-05 RX ORDER — ASPIRIN/CALCIUM CARB/MAGNESIUM 324 MG
81 TABLET ORAL DAILY
Refills: 0 | Status: DISCONTINUED | OUTPATIENT
Start: 2021-09-05 | End: 2021-09-06

## 2021-09-05 RX ORDER — INSULIN LISPRO 100/ML
VIAL (ML) SUBCUTANEOUS
Refills: 0 | Status: DISCONTINUED | OUTPATIENT
Start: 2021-09-05 | End: 2021-09-06

## 2021-09-05 RX ORDER — GLUCAGON INJECTION, SOLUTION 0.5 MG/.1ML
1 INJECTION, SOLUTION SUBCUTANEOUS ONCE
Refills: 0 | Status: DISCONTINUED | OUTPATIENT
Start: 2021-09-05 | End: 2021-09-06

## 2021-09-05 RX ORDER — SIMVASTATIN 20 MG/1
80 TABLET, FILM COATED ORAL AT BEDTIME
Refills: 0 | Status: DISCONTINUED | OUTPATIENT
Start: 2021-09-05 | End: 2021-09-06

## 2021-09-05 RX ORDER — DEXTROSE 50 % IN WATER 50 %
12.5 SYRINGE (ML) INTRAVENOUS ONCE
Refills: 0 | Status: DISCONTINUED | OUTPATIENT
Start: 2021-09-05 | End: 2021-09-06

## 2021-09-05 RX ORDER — ALPRAZOLAM 0.25 MG
0.25 TABLET ORAL ONCE
Refills: 0 | Status: DISCONTINUED | OUTPATIENT
Start: 2021-09-05 | End: 2021-09-06

## 2021-09-05 RX ORDER — ASPIRIN/CALCIUM CARB/MAGNESIUM 324 MG
162 TABLET ORAL ONCE
Refills: 0 | Status: COMPLETED | OUTPATIENT
Start: 2021-09-05 | End: 2021-09-05

## 2021-09-05 RX ORDER — LOSARTAN POTASSIUM 100 MG/1
50 TABLET, FILM COATED ORAL DAILY
Refills: 0 | Status: DISCONTINUED | OUTPATIENT
Start: 2021-09-05 | End: 2021-09-06

## 2021-09-05 RX ORDER — HEPARIN SODIUM 5000 [USP'U]/ML
4100 INJECTION INTRAVENOUS; SUBCUTANEOUS ONCE
Refills: 0 | Status: COMPLETED | OUTPATIENT
Start: 2021-09-05 | End: 2021-09-05

## 2021-09-05 RX ORDER — TIMOLOL 0.5 %
1 DROPS OPHTHALMIC (EYE) DAILY
Refills: 0 | Status: DISCONTINUED | OUTPATIENT
Start: 2021-09-05 | End: 2021-09-06

## 2021-09-05 RX ORDER — ACETAMINOPHEN 500 MG
650 TABLET ORAL EVERY 6 HOURS
Refills: 0 | Status: DISCONTINUED | OUTPATIENT
Start: 2021-09-05 | End: 2021-09-06

## 2021-09-05 RX ORDER — DEXTROSE 50 % IN WATER 50 %
15 SYRINGE (ML) INTRAVENOUS ONCE
Refills: 0 | Status: DISCONTINUED | OUTPATIENT
Start: 2021-09-05 | End: 2021-09-06

## 2021-09-05 RX ORDER — PANTOPRAZOLE SODIUM 20 MG/1
40 TABLET, DELAYED RELEASE ORAL
Refills: 0 | Status: DISCONTINUED | OUTPATIENT
Start: 2021-09-05 | End: 2021-09-06

## 2021-09-05 RX ORDER — HEPARIN SODIUM 5000 [USP'U]/ML
INJECTION INTRAVENOUS; SUBCUTANEOUS
Qty: 25000 | Refills: 0 | Status: DISCONTINUED | OUTPATIENT
Start: 2021-09-05 | End: 2021-09-06

## 2021-09-05 RX ORDER — HYDRALAZINE HCL 50 MG
10 TABLET ORAL ONCE
Refills: 0 | Status: COMPLETED | OUTPATIENT
Start: 2021-09-05 | End: 2021-09-05

## 2021-09-05 RX ORDER — FINASTERIDE 5 MG/1
5 TABLET, FILM COATED ORAL DAILY
Refills: 0 | Status: DISCONTINUED | OUTPATIENT
Start: 2021-09-05 | End: 2021-09-06

## 2021-09-05 RX ADMIN — SIMVASTATIN 80 MILLIGRAM(S): 20 TABLET, FILM COATED ORAL at 22:00

## 2021-09-05 RX ADMIN — Medication 30 MILLILITER(S): at 16:28

## 2021-09-05 RX ADMIN — Medication 162 MILLIGRAM(S): at 08:36

## 2021-09-05 RX ADMIN — HEPARIN SODIUM 4100 UNIT(S): 5000 INJECTION INTRAVENOUS; SUBCUTANEOUS at 18:30

## 2021-09-05 RX ADMIN — HEPARIN SODIUM 800 UNIT(S)/HR: 5000 INJECTION INTRAVENOUS; SUBCUTANEOUS at 18:36

## 2021-09-05 RX ADMIN — Medication 10 MILLIGRAM(S): at 14:22

## 2021-09-05 RX ADMIN — Medication 1: at 18:03

## 2021-09-05 RX ADMIN — Medication 30 MILLILITER(S): at 08:36

## 2021-09-05 NOTE — H&P ADULT - NSICDXPASTSURGICALHX_GEN_ALL_CORE_FT
PAST SURGICAL HISTORY:  History of coronary artery stent placement     S/P CABG (coronary artery bypass graft)

## 2021-09-05 NOTE — ED PROVIDER NOTE - CLINICAL SUMMARY MEDICAL DECISION MAKING FREE TEXT BOX
88yo male with extensive cardiac history of CAD s/p CABG 1995 with PCI, ascending thoracic aortic aneurysm, DM, HTN, prolonged 1st degree AV block presenting with epigastric pain radiating to his left chest with shortness of breath starting at 2AM this morning with symptoms resolving after 4 hours. 1st degree AV block seen on EKG without acute ST changes compared to old EKG with unremarkable physical exam with lungs clear bilaterally concerning for but not limited to ACS, GERD, esophageal spasm. Will order labs and imaging and reassess.

## 2021-09-05 NOTE — ED ADULT NURSE NOTE - OBJECTIVE STATEMENT
"felt a burning pain in my left chest. felt like my stomach reflux that I get all the time but worse." pt reports pain resolved pta. pt has no other complaints at this time. a and o x3. breathing even and unlabored. nsr on cm. will continue to monitor.

## 2021-09-05 NOTE — ED ADULT TRIAGE NOTE - CHIEF COMPLAINT QUOTE
patient states that he has been having heartburn chest pain and burping a lot over the past 2 days states "I just don't feel right"

## 2021-09-05 NOTE — H&P ADULT - NSHPPHYSICALEXAM_GEN_ALL_CORE
GENERAL: pt examined bedside, laying comfortably in bed in NAD   HEENT: NC/AT, moist oral mucosa, clear conjunctiva, sclera nonicteric  RESPIRATORY: Normal respiratory effort; CTA b/l, no wheezing, rhonchi, rales  CARDIOVASCULAR: RRR, normal S1 and S2  ABDOMEN: soft, NT/ND, normoactive bowel sounds, no rebound/guarding  MSK: No joint deformities, edema, erythema  EXTREMITIES: No cynaosis, no clubbing, no lower extremity edema; Peripheral pulses are 2+ bilaterally  PSYCH: anxious affect and intermittently agitated   NEUROLOGY: A+O to person, place, and time, no focal neurologic deficits appreciated   SKIN: No rashes or no palpable lesions

## 2021-09-05 NOTE — ED PROVIDER NOTE - ATTENDING CONTRIBUTION TO CARE
HPI: 88yo male with PMH Coronary Artery Disease s/p CABG, DM, HTN, 1st degree AV block presenting with epigastric/L chest wall pain a/w shortness of breath starting at 2AM. No associated numbness/tingling/weakness. Now asymptomatic.     PE:  Gen: NAD  Head: NCAT  HEENT: Oral mucosa moist, normal conjunctiva  CV: RRR no murmurs, normal perfusion  Resp: CTA b/l, no wheezing, rales, rhonchi, no respiratory distress  GI: Abd Soft NTND  Neuro: No focal neuro deficits  MSK: FROM all 4 extremities, no deformity  Skin: No rash, no bruising  Psych: Normal affect    MDM: Chest pain x 4 hours, now resolved, EKG no ischemic changes, trop neg. D/w patient's cardiologist ([facility])- recommending outpatient f/u. Ioana Way DO         I performed a history and physical exam of the patient and discussed their management with the resident. I reviewed the resident's note and agree with the documented findings and plan of care. My medical decision making and observations are found above.

## 2021-09-05 NOTE — ED ADULT NURSE NOTE - NSIMPLEMENTINTERV_GEN_ALL_ED
Implemented All Fall with Harm Risk Interventions:  [place] to call system. Call bell, personal items and telephone within reach. Instruct patient to call for assistance. Room bathroom lighting operational. Non-slip footwear when patient is off stretcher. Physically safe environment: no spills, clutter or unnecessary equipment. Stretcher in lowest position, wheels locked, appropriate side rails in place. Provide visual cue, wrist band, yellow gown, etc. Monitor gait and stability. Monitor for mental status changes and reorient to person, place, and time. Review medications for side effects contributing to fall risk. Reinforce activity limits and safety measures with patient and family. Provide visual clues: red socks.

## 2021-09-05 NOTE — ED PROVIDER NOTE - OBJECTIVE STATEMENT
89yo male with history of CAD s/p CABG 1995 with PCI, ascending thoracic aortic aneurysm, DM, HTN, prolonged 1st degree AV block presenting with epigastric pain radiating to his left chest with shortness of breath starting at 2AM this morning lasting 4 hours without syncope, nausea, vomiting, headache, back pain or focal neurologic symptoms. Patient states he is now asymptomatic. Was previously treated for GERD with omeprazole.

## 2021-09-05 NOTE — H&P ADULT - NSHPLABSRESULTS_GEN_ALL_CORE
14.1   8.44  )-----------( 188      ( 05 Sep 2021 08:41 )             42.3         09-05    137  |  102  |  22.2<H>  ----------------------------<  147<H>  4.5   |  23.0  |  0.71    Ca    9.8      05 Sep 2021 08:41  Mg     1.9     09-05    TPro  7.6  /  Alb  4.2  /  TBili  0.5  /  DBili  x   /  AST  26  /  ALT  26  /  AlkPhos  66  09-05        Troponin T, Serum: 0.13: Reference Interval for Troponin T  Less than 0.06 ng/mL - includes the 99th percentile of a healthy  population at a method C.V. of 10% or less.  NOTE: Troponin T is measured by the Roche CLIA method and these  results are not interchangable with Troponin I results since they are  different assays with different reference intervals. ng/mL (09.05.21 @ 11:55)

## 2021-09-05 NOTE — CONSULT NOTE ADULT - SUBJECTIVE AND OBJECTIVE BOX
[facility] [[email or web address]], [facility]                                          [address], [address], [place] [address]                                               PHONE: [phone]    FAX: [phone] [address], [address], [place] [address]                                       PHONE: [phone]    FAX: [phone]  *******************************************************************************    Reason for Consult/CC: Elevated troponin    HPI:  CRISTÓBAL YEE is a 89y man with hypertension, diabetes, hyperlipidemia, coronary artery disease status post remote bypass grafting (1995) with subsequent PCI/drug-eluting stent, first-degree AV block, ascending aortic aneurysm, peripheral vascular disease with moderate bilateral carotid stenosis, and COVID-19 infection (March 2021), who presents to the emergency room for evaluation of epigastric pain.  The patient states he was feeling well until early this morning when he developed "heartburn" and decided to come to the emergency room for evaluation.  Patient states that he felt better shortly after arriving to the emergency room and denies any chest pain tightness or heaviness, no shortness of breath or difficulty breathing, no palpitations, no dizziness lightheadedness syncope or near syncope.  At the time of this evaluation patient states he is feeling well and is very anxious to leave the hospital.    PAST MEDICAL & SURGICAL HISTORY:  High cholesterol    Diabetes    MI, old    S/P CABG (coronary artery bypass graft)    History of coronary artery stent placement        No Known Allergies      MEDICATIONS  (STANDING):  aspirin  chewable 81 milliGRAM(s) Oral daily  finasteride 5 milliGRAM(s) Oral daily  heparin   Injectable 4100 Unit(s) IV Push once  heparin  Infusion.  Unit(s)/Hr (8 mL/Hr) IV Continuous <Continuous>  losartan 50 milliGRAM(s) Oral daily  pantoprazole    Tablet 40 milliGRAM(s) Oral before breakfast  simvastatin Oral Tab/Cap - Peds 80 milliGRAM(s) Oral at bedtime  timolol 0.5% Solution 1 Drop(s) Both EYES daily    MEDICATIONS  (PRN):  acetaminophen   Tablet .. 650 milliGRAM(s) Oral every 6 hours PRN Temp greater or equal to 38.5C (101.3F), Mild Pain (1 - 3)  aluminum hydroxide/magnesium hydroxide/simethicone Suspension 30 milliLiter(s) Oral every 4 hours PRN Dyspepsia  heparin   Injectable 4100 Unit(s) IV Push every 6 hours PRN For aPTT less than 40  melatonin 3 milliGRAM(s) Oral at bedtime PRN Insomnia      Social History: no active tobacco / EtOH / IVDA    Family History: FH: hypertension        ROS: As noted above, otherwise unremarkable.    Vital Signs Last 24 Hrs  T(C): 36.7 (05 Sep 2021 14:15), Max: 37.2 (05 Sep 2021 07:01)  T(F): 98.1 (05 Sep 2021 14:15), Max: 98.9 (05 Sep 2021 07:01)  HR: 64 (05 Sep 2021 14:15) (64 - 76)  BP: 148/71 (05 Sep 2021 14:15) (148/71 - 191/58)  RR: 18 (05 Sep 2021 14:15) (18 - 18)  SpO2: 97% (05 Sep 2021 14:15) (95% - 97%)      PHYSICAL EXAM:  General: Appears well developed, well nourished, no acute distress  HEENT: Head: normocephalic, atraumatic  Eyes: Pupils equal and reactive  Neck: Supple, no carotid bruit, no JVD, no HJR  CARDIOVASCULAR: Normal S1 and S2, no murmur, rub, or gallop  LUNGS: Clear to auscultation bilaterally, no rales, rhonchi or wheeze  ABDOMEN: Soft, nontender, non-distended, positive bowel sounds, no mass or bruit  EXTREMITIES: No edema, distal pulses WNL  SKIN: Warm and dry with normal turgor  NEURO: Alert & oriented x 3, grossly intact  PSYCH: normal mood and affect    LABS:                        14.1   8.44  )-----------( 188      ( 05 Sep 2021 08:41 )             42.3     09-05    137  |  102  |  22.2<H>  ----------------------------<  147<H>  4.5   |  23.0  |  0.71    Ca    9.8      05 Sep 2021 08:41  Mg     1.9     09-05    TPro  7.6  /  Alb  4.2  /  TBili  0.5  /  DBili  x   /  AST  26  /  ALT  26  /  AlkPhos  66  09-05    CARDIAC MARKERS ( 05 Sep 2021 15:25 )  x     / 0.16 ng/mL / x     / x     / x      CARDIAC MARKERS ( 05 Sep 2021 11:55 )  x     / 0.13 ng/mL / x     / x     / x      CARDIAC MARKERS ( 05 Sep 2021 08:41 )  x     / 0.02 ng/mL / x     / x     / x          PT/INR - ( 05 Sep 2021 13:44 )   PT: 12.7 sec;   INR: 1.10 ratio         PTT - ( 05 Sep 2021 13:44 )  PTT:28.5 sec      RADIOLOGY & ADDITIONAL STUDIES:    ECG: Sinus rhythm at 81 bpm with marked first-degree AV block ( ms); ST T wave abnormality consider lateral ischemia, without significant change from previous.    Assessment and Plan:  In summary, CRISTÓBAL YEE is a 89y man with hypertension, diabetes, hyperlipidemia, coronary artery disease status post remote bypass grafting (1995) with subsequent PCI/drug-eluting stent, first-degree AV block, ascending aortic aneurysm, peripheral vascular disease with moderate bilateral carotid stenosis, and COVID-19 infection (March 2021), admitted for evaluation of epigastric pain.     - Monitor on telemetry  - The patient has been chest pain-free since admission without new ischemic ECG abnormalities.  Borderline elevated essentially flat trending troponin is nonspecific, particularly in the setting of known severe coronary disease.  Doubt acute myocardial infarction.  Moreover, the patient is status post pharmacologic nuclear stress test (May 2021) which revealed a large fixed inferior and inferolateral wall defect consistent with prior infarct with no evidence of ischemia.    - Continue medical management of known coronary disease including aspirin 81 mg daily.  - Echocardiogram (July 2021) reviewed: Mild concentric left ventricular perjury feet with low normal left ventricular systolic function (LVEF 50 to 55%), diastolic dysfunction; basal inferior inferolateral hypokinesis is noted and unchanged; mild to moderate mitral regurgitation mild/moderate aortic regurgitation mild tricuspid regurgitation; aortic root and ascending aorta remain dilated at 4.4 and 4.5 cm respectively.  - Rhythm/hemodynamics stable = continue current doses for now and titrate PRN  - Hyperlipidemia: Continue statin.  - Diabetes: Continue management per primary team.  - A sending aortic aneurysm: Most recent echocardiogram from July 2021 revealed stable ascending aortic aneurysm.  As patient symptoms seem to have completely resolved it is unlikely that his symptoms are related to the ACE and aortic aneurysm however should symptoms recur would consider CT angiogram to rule out dissection.  - Keep K > 4, Mg > 2    We will follow with you.  Thank you for allowing me to participate in the care of your patient.      Sincerely,    Arthur Russell M.D.

## 2021-09-05 NOTE — H&P ADULT - HISTORY OF PRESENT ILLNESS
88 y/o M w/ PMH of CAD s/p CABG 1995 w/ subsequent PCI, ascending thoracic aortic aneurysm (4.8 cm), DMII, GERD, HTN, HLD and prolonged 1st degree AV block came in c/o burning epigastric pain radiating to his L-chest that began last night.  He had associated sob during initial event but has since resolved.  He states he feels fine now.  He denies diaphoresis, N/V, dizziness, fever, chills, swelling of legs.  Of note, pt follows closely w/ cardiology and had a negative nuclear stress test 2 months ago and normal TTE.

## 2021-09-05 NOTE — ED PROVIDER NOTE - PHYSICAL EXAMINATION
General: Well appearing in no acute distress. Alert and cooperative.   Head: Normocephalic, atraumatic.  Eyes: PERRLA. No conjunctival injection. No scleral icterus. EOMI  ENMT: Atraumatic external nose and ears. Moist mucous membranes. Oropharynx clear.  Neck: Soft and supple. Full ROM without pain. No midline tenderness. No Thyromegaly. No lymphadenopathy.  Cardiac: Regular rate and regular rhythm. No murmurs, rubs, gallops. Peripheral pulses 2+ and symmetric in all extremities. No LE edema.  Resp: Unlabored respiratory effort. Lungs CTAB. Speaking in full sentences. No wheezes, rales or rhonchi.  Abd: Soft, non-tender, non-distended. No guarding or rebound. No scars, masses, or lesions.  MSK: Spine midline and non-tender. No CVA tenderness.    Skin: Warm and dry. No rashes, abrasions, or lacerations.  Neuro: AO x 3. Moves all extremities symmetrically. Motor strength and sensation grossly intact.

## 2021-09-05 NOTE — H&P ADULT - ASSESSMENT
89 y/o M w/ PMH of CAD s/p CABG 1995 w/ subsequent PCI, ascending thoracic aortic aneurysm (4.8 cm), DMII, GERD, HTN, HLD and prolonged 1st degree AV block came in c/o burning epigastric pain radiating to his L-chest that began last night.  BP elevated and trending up when becomes agitated.  Trop 0.02-->0.13.  EKG w/ prolonged pr and ST-TW changes but unghanged from prior.  Pt will be admitted to r/o ACS.        NSTEMI   - Started on heparin gtt by ED   - Trops 0.02-->0.13; will continue to trend  - EKG w/ prolonged 1st degree AVB w/ sttw abnormalities unchagned from prior  - Will repeat EKG to assess for dynamic changes  - Monitor on telemetry  - Maintain K>4 and Mg>2  - Pt had (-)NST and normal TTE 2 months ago  - c/w ASA, BB and statin therapy   - Cardiology consulted       HTN Crisis  - Agitation contributing to uncontrolled BP  - Baseline -150s  - Pt moved to [facility] area to help with agitation    - 1x dose of IV hydralazine   - c/w home BP medications       CAD/HLD  - c/w home medications   - Order lipid panel       DM II  - Hold oral medications while hospitalized  - Place on ISS and if BG uncontrolled start on basal / bolus regimen  - Ordered A1c   - Hypoglycemic protocol on board      GERD  - PPI therapy         VTE ppx: heparin gtt

## 2021-09-05 NOTE — H&P ADULT - NSHPREVIEWOFSYSTEMS_GEN_ALL_CORE
CONSTITUTIONAL: no fevers, chills, night sweats, weight changes  HEENT: no vision changes or diplopia, no tinnitus, no sore throat  RESPIRATORY: denies dyspnea, sob, nocturnal cough, sputum or hemoptysis  CARDIOVASCULAR: +CP, palpitations or lower extremity edema  GI: no dysphagia, nausea, +epigastric abd pain, constipation, diarrhea, stool change or blood in stool  : no dysuria or hematuria, no flank pain, no incontinence or urinary retention  MSK: no joint pain or swelling  INTEGUMENTARY: no rashes or lesions  NEUROLOGICAL: no HA, confusion, syncope, numbness, weakness, tremors or ataxia  PSYCH: denies depression  ENDOCRINE: no polyuria, polydipsia, no temp intolerance, no tremors, no changes in skin, hair or nails  HEMATOLOGIC/LYMPHATIC: no lymph node enlargement, abnormal bruising or bleeding

## 2021-09-06 ENCOUNTER — TRANSCRIPTION ENCOUNTER (OUTPATIENT)
Age: 86
End: 2021-09-06

## 2021-09-06 VITALS
SYSTOLIC BLOOD PRESSURE: 149 MMHG | DIASTOLIC BLOOD PRESSURE: 55 MMHG | RESPIRATION RATE: 19 BRPM | OXYGEN SATURATION: 98 % | HEART RATE: 74 BPM | TEMPERATURE: 98 F

## 2021-09-06 LAB
A1C WITH ESTIMATED AVERAGE GLUCOSE RESULT: 6.5 % — HIGH (ref 4–5.6)
ANION GAP SERPL CALC-SCNC: 14 MMOL/L — SIGNIFICANT CHANGE UP (ref 5–17)
APTT BLD: 40.7 SEC — HIGH (ref 27.5–35.5)
APTT BLD: 49.5 SEC — HIGH (ref 27.5–35.5)
BUN SERPL-MCNC: 17.8 MG/DL — SIGNIFICANT CHANGE UP (ref 8–20)
CALCIUM SERPL-MCNC: 9.7 MG/DL — SIGNIFICANT CHANGE UP (ref 8.6–10.2)
CHLORIDE SERPL-SCNC: 101 MMOL/L — SIGNIFICANT CHANGE UP (ref 98–107)
CHOLEST SERPL-MCNC: 140 MG/DL — SIGNIFICANT CHANGE UP
CO2 SERPL-SCNC: 23 MMOL/L — SIGNIFICANT CHANGE UP (ref 22–29)
COVID-19 SPIKE DOMAIN AB INTERP: POSITIVE
COVID-19 SPIKE DOMAIN ANTIBODY RESULT: >250 U/ML — HIGH
CREAT SERPL-MCNC: 0.84 MG/DL — SIGNIFICANT CHANGE UP (ref 0.5–1.3)
ESTIMATED AVERAGE GLUCOSE: 140 MG/DL — HIGH (ref 68–114)
GLUCOSE BLDC GLUCOMTR-MCNC: 124 MG/DL — HIGH (ref 70–99)
GLUCOSE BLDC GLUCOMTR-MCNC: 260 MG/DL — HIGH (ref 70–99)
GLUCOSE SERPL-MCNC: 269 MG/DL — HIGH (ref 70–99)
HCT VFR BLD CALC: 41.1 % — SIGNIFICANT CHANGE UP (ref 39–50)
HCT VFR BLD CALC: 43.6 % — SIGNIFICANT CHANGE UP (ref 39–50)
HDLC SERPL-MCNC: 55 MG/DL — SIGNIFICANT CHANGE UP
HGB BLD-MCNC: 13.7 G/DL — SIGNIFICANT CHANGE UP (ref 13–17)
HGB BLD-MCNC: 14.3 G/DL — SIGNIFICANT CHANGE UP (ref 13–17)
LIPID PNL WITH DIRECT LDL SERPL: 65 MG/DL — SIGNIFICANT CHANGE UP
MCHC RBC-ENTMCNC: 30.6 PG — SIGNIFICANT CHANGE UP (ref 27–34)
MCHC RBC-ENTMCNC: 30.8 PG — SIGNIFICANT CHANGE UP (ref 27–34)
MCHC RBC-ENTMCNC: 32.8 GM/DL — SIGNIFICANT CHANGE UP (ref 32–36)
MCHC RBC-ENTMCNC: 33.3 GM/DL — SIGNIFICANT CHANGE UP (ref 32–36)
MCV RBC AUTO: 92.4 FL — SIGNIFICANT CHANGE UP (ref 80–100)
MCV RBC AUTO: 93.4 FL — SIGNIFICANT CHANGE UP (ref 80–100)
NON HDL CHOLESTEROL: 85 MG/DL — SIGNIFICANT CHANGE UP
PLATELET # BLD AUTO: 199 K/UL — SIGNIFICANT CHANGE UP (ref 150–400)
PLATELET # BLD AUTO: 216 K/UL — SIGNIFICANT CHANGE UP (ref 150–400)
POTASSIUM SERPL-MCNC: 4.1 MMOL/L — SIGNIFICANT CHANGE UP (ref 3.5–5.3)
POTASSIUM SERPL-SCNC: 4.1 MMOL/L — SIGNIFICANT CHANGE UP (ref 3.5–5.3)
RBC # BLD: 4.45 M/UL — SIGNIFICANT CHANGE UP (ref 4.2–5.8)
RBC # BLD: 4.67 M/UL — SIGNIFICANT CHANGE UP (ref 4.2–5.8)
RBC # FLD: 13.2 % — SIGNIFICANT CHANGE UP (ref 10.3–14.5)
RBC # FLD: 13.6 % — SIGNIFICANT CHANGE UP (ref 10.3–14.5)
SARS-COV-2 IGG+IGM SERPL QL IA: >250 U/ML — HIGH
SARS-COV-2 IGG+IGM SERPL QL IA: POSITIVE
SODIUM SERPL-SCNC: 138 MMOL/L — SIGNIFICANT CHANGE UP (ref 135–145)
TRIGL SERPL-MCNC: 102 MG/DL — SIGNIFICANT CHANGE UP
TROPONIN T SERPL-MCNC: 0.32 NG/ML — HIGH (ref 0–0.06)
WBC # BLD: 7.35 K/UL — SIGNIFICANT CHANGE UP (ref 3.8–10.5)
WBC # BLD: 7.75 K/UL — SIGNIFICANT CHANGE UP (ref 3.8–10.5)
WBC # FLD AUTO: 7.35 K/UL — SIGNIFICANT CHANGE UP (ref 3.8–10.5)
WBC # FLD AUTO: 7.75 K/UL — SIGNIFICANT CHANGE UP (ref 3.8–10.5)

## 2021-09-06 PROCEDURE — 99285 EMERGENCY DEPT VISIT HI MDM: CPT

## 2021-09-06 PROCEDURE — 83880 ASSAY OF NATRIURETIC PEPTIDE: CPT

## 2021-09-06 PROCEDURE — 86769 SARS-COV-2 COVID-19 ANTIBODY: CPT

## 2021-09-06 PROCEDURE — 85610 PROTHROMBIN TIME: CPT

## 2021-09-06 PROCEDURE — U0003: CPT

## 2021-09-06 PROCEDURE — 84484 ASSAY OF TROPONIN QUANT: CPT

## 2021-09-06 PROCEDURE — 83735 ASSAY OF MAGNESIUM: CPT

## 2021-09-06 PROCEDURE — 82962 GLUCOSE BLOOD TEST: CPT

## 2021-09-06 PROCEDURE — 83690 ASSAY OF LIPASE: CPT

## 2021-09-06 PROCEDURE — U0005: CPT

## 2021-09-06 PROCEDURE — 80048 BASIC METABOLIC PNL TOTAL CA: CPT

## 2021-09-06 PROCEDURE — 80061 LIPID PANEL: CPT

## 2021-09-06 PROCEDURE — 80053 COMPREHEN METABOLIC PANEL: CPT

## 2021-09-06 PROCEDURE — 85027 COMPLETE CBC AUTOMATED: CPT

## 2021-09-06 PROCEDURE — 36415 COLL VENOUS BLD VENIPUNCTURE: CPT

## 2021-09-06 PROCEDURE — 99239 HOSP IP/OBS DSCHRG MGMT >30: CPT

## 2021-09-06 PROCEDURE — 83036 HEMOGLOBIN GLYCOSYLATED A1C: CPT

## 2021-09-06 PROCEDURE — 71046 X-RAY EXAM CHEST 2 VIEWS: CPT

## 2021-09-06 PROCEDURE — 85730 THROMBOPLASTIN TIME PARTIAL: CPT

## 2021-09-06 PROCEDURE — 85025 COMPLETE CBC W/AUTO DIFF WBC: CPT

## 2021-09-06 PROCEDURE — 93005 ELECTROCARDIOGRAM TRACING: CPT

## 2021-09-06 RX ORDER — METOPROLOL TARTRATE 50 MG
25 TABLET ORAL DAILY
Refills: 0 | Status: DISCONTINUED | OUTPATIENT
Start: 2021-09-06 | End: 2021-09-06

## 2021-09-06 RX ORDER — LOSARTAN POTASSIUM 100 MG/1
1 TABLET, FILM COATED ORAL
Qty: 0 | Refills: 0 | DISCHARGE
Start: 2021-09-06

## 2021-09-06 RX ORDER — ALPRAZOLAM 0.25 MG
0.25 TABLET ORAL THREE TIMES A DAY
Refills: 0 | Status: DISCONTINUED | OUTPATIENT
Start: 2021-09-06 | End: 2021-09-06

## 2021-09-06 RX ORDER — HEPARIN SODIUM 5000 [USP'U]/ML
950 INJECTION INTRAVENOUS; SUBCUTANEOUS
Qty: 25000 | Refills: 0 | Status: DISCONTINUED | OUTPATIENT
Start: 2021-09-06 | End: 2021-09-06

## 2021-09-06 RX ORDER — LOSARTAN POTASSIUM 100 MG/1
100 TABLET, FILM COATED ORAL DAILY
Refills: 0 | Status: DISCONTINUED | OUTPATIENT
Start: 2021-09-06 | End: 2021-09-06

## 2021-09-06 RX ADMIN — HEPARIN SODIUM 950 UNIT(S)/HR: 5000 INJECTION INTRAVENOUS; SUBCUTANEOUS at 02:10

## 2021-09-06 RX ADMIN — HEPARIN SODIUM 1100 UNIT(S)/HR: 5000 INJECTION INTRAVENOUS; SUBCUTANEOUS at 09:42

## 2021-09-06 RX ADMIN — FINASTERIDE 5 MILLIGRAM(S): 5 TABLET, FILM COATED ORAL at 12:53

## 2021-09-06 RX ADMIN — Medication 81 MILLIGRAM(S): at 12:54

## 2021-09-06 RX ADMIN — Medication 3: at 10:43

## 2021-09-06 RX ADMIN — LOSARTAN POTASSIUM 50 MILLIGRAM(S): 100 TABLET, FILM COATED ORAL at 05:15

## 2021-09-06 RX ADMIN — PANTOPRAZOLE SODIUM 40 MILLIGRAM(S): 20 TABLET, DELAYED RELEASE ORAL at 05:15

## 2021-09-06 RX ADMIN — Medication 1 DROP(S): at 12:53

## 2021-09-06 RX ADMIN — Medication 25 MILLIGRAM(S): at 12:53

## 2021-09-06 NOTE — DISCHARGE NOTE NURSING/CASE MANAGEMENT/SOCIAL WORK - PATIENT PORTAL LINK FT
You can access the FollowMyHealth Patient Portal offered by [facility] by registering at the following website: [email or web address]. By joining [facility]’s FollowMyHealth portal, you will also be able to view your health information using other applications (apps) compatible with our system.

## 2021-09-06 NOTE — DISCHARGE NOTE NURSING/CASE MANAGEMENT/SOCIAL WORK - NSDCPEFALRISK_GEN_ALL_CORE
For information on Fall & injury Prevention, visit [email or web address]

## 2021-09-06 NOTE — PROGRESS NOTE ADULT - SUBJECTIVE AND OBJECTIVE BOX
[facility]                                                    [address], [address], [place] [address]                                                         PHONE: [phone]    FAX: [phone] [address], [address], [place] [address]                                                 PHONE: [phone]    FAX: [phone]  *******************************************************************************    Overnight events/Subjective Assessment:  No new cardiovascular issues overnight.  No recurrent chest pain since admission.  BP improved.  No SOB or palpitations.  Patient anxious to go home.    No Known Allergies    MEDICATIONS  (STANDING):  ALPRAZolam 0.25 milliGRAM(s) Oral once  aspirin  chewable 81 milliGRAM(s) Oral daily  dextrose 40% Gel 15 Gram(s) Oral once  dextrose 5%. 1000 milliLiter(s) (50 mL/Hr) IV Continuous <Continuous>  dextrose 5%. 1000 milliLiter(s) (100 mL/Hr) IV Continuous <Continuous>  dextrose 50% Injectable 25 Gram(s) IV Push once  dextrose 50% Injectable 12.5 Gram(s) IV Push once  dextrose 50% Injectable 25 Gram(s) IV Push once  finasteride 5 milliGRAM(s) Oral daily  glucagon  Injectable 1 milliGRAM(s) IntraMuscular once  heparin  Infusion. 950 Unit(s)/Hr (9.5 mL/Hr) IV Continuous <Continuous>  insulin lispro (ADMELOG) corrective regimen sliding scale   SubCutaneous three times a day before meals  losartan 50 milliGRAM(s) Oral daily  pantoprazole    Tablet 40 milliGRAM(s) Oral before breakfast  simvastatin Oral Tab/Cap - Peds 80 milliGRAM(s) Oral at bedtime  timolol 0.5% Solution 1 Drop(s) Both EYES daily    MEDICATIONS  (PRN):  acetaminophen   Tablet .. 650 milliGRAM(s) Oral every 6 hours PRN Temp greater or equal to 38.5C (101.3F), Mild Pain (1 - 3)  ALPRAZolam 0.25 milliGRAM(s) Oral three times a day PRN Anxiety  aluminum hydroxide/magnesium hydroxide/simethicone Suspension 30 milliLiter(s) Oral every 4 hours PRN Dyspepsia  heparin   Injectable 4100 Unit(s) IV Push every 6 hours PRN For aPTT less than 40  melatonin 3 milliGRAM(s) Oral at bedtime PRN Insomnia      Vital Signs Last 24 Hrs  T(C): 36.7 (06 Sep 2021 08:10), Max: 36.8 (06 Sep 2021 04:00)  T(F): 98.1 (06 Sep 2021 08:10), Max: 98.2 (06 Sep 2021 04:00)  HR: 83 (06 Sep 2021 08:10) (64 - 96)  BP: 144/57 (06 Sep 2021 08:10) (112/80 - 191/58)  BP(mean): --  RR: 19 (06 Sep 2021 08:10) (18 - 19)  SpO2: 97% (06 Sep 2021 08:10) (96% - 99%)    I&O's Detail    05 Sep 2021 07:01  -  06 Sep 2021 07:00  --------------------------------------------------------  IN:  Total IN: 0 mL    OUT:    Voided (mL): 200 mL  Total OUT: 200 mL    Total NET: -200 mL        I&O's Summary    05 Sep 2021 07:01  -  06 Sep 2021 07:00  --------------------------------------------------------  IN: 0 mL / OUT: 200 mL / NET: -200 mL            PHYSICAL EXAM:  General: Appears well developed, well nourished, no acute distress  HEENT: Head: normocephalic, atraumatic  Eyes: Pupils equal and reactive  Neck: Supple, no carotid bruit, no JVD, no HJR  CARDIOVASCULAR: Normal S1 and S2, no murmur, rub, or gallop  LUNGS: Clear to auscultation bilaterally, no rales, rhonchi or wheeze  ABDOMEN: Soft, nontender, non-distended, positive bowel sounds, no mass or bruit  EXTREMITIES: No edema, distal pulses WNL  SKIN: Warm and dry with normal turgor  NEURO: Alert & oriented x 3, grossly intact  PSYCH: normal mood and affect        LABS:                        14.3   7.75  )-----------( 216      ( 06 Sep 2021 08:11 )             43.6     09-06    138  |  101  |  17.8  ----------------------------<  269<H>  4.1   |  23.0  |  0.84    Ca    9.7      06 Sep 2021 08:11  Mg     1.9     09-05    TPro  7.6  /  Alb  4.2  /  TBili  0.5  /  DBili  x   /  AST  26  /  ALT  26  /  AlkPhos  66  09-05    CARDIAC MARKERS ( 06 Sep 2021 08:11 )  x     / 0.32 ng/mL / x     / x     / x      CARDIAC MARKERS ( 05 Sep 2021 22:22 )  x     / 0.38 ng/mL / x     / x     / x      CARDIAC MARKERS ( 05 Sep 2021 15:25 )  x     / 0.16 ng/mL / x     / x     / x      CARDIAC MARKERS ( 05 Sep 2021 11:55 )  x     / 0.13 ng/mL / x     / x     / x      CARDIAC MARKERS ( 05 Sep 2021 08:41 )  x     / 0.02 ng/mL / x     / x     / x          PT/INR - ( 05 Sep 2021 13:44 )   PT: 12.7 sec;   INR: 1.10 ratio         PTT - ( 06 Sep 2021 08:14 )  PTT:49.5 sec  Serum Pro-Brain Natriuretic Peptide: 863 pg/mL (09-05 @ 08:41)  serum  Lipids:         RADIOLOGY & ADDITIONAL STUDIES:    TELEMETRY PERSONALLY REVIEWED: sinus 70-100s, 1st degree AVB, PVCs, 4b NSVT yesterday        ASSESSMENT AND PLAN:  In summary, CRISTÓBAL YEE is a 89M a/w chest pain/indigestion resolved, mild troponin increase (peak 0.38), increased BP improved, h/o CAD s/p CABG 6/95 & distant PCI/stent RCA with patent LIMA->LAD & occluded ANTON->RCA noted at that time, normal LV fxn, prolonged 1st degree AVB, ascending thoracic aortic aneurysm, PVD with moderate carotid stenosis, DM, HTN, HL, COVID-19 3/21    - Stable cardiovascular status.  Chest pain resolved, no acute ischemic EKG changes, troponin trended down.  Persantine Nuclear stress test 5/21 demonstrated a large fixed defect involving the basal-mid inferior wall of moderate-severe intensity c/w prior infarction with no significant residual ischemia, mild hypokinesis of inferior wall with remaining walls chemo effectively, normal LV fxn, EF 55%.  I had an extensive discussion with the patient and his daughter over the phone regarding treatment options including cardiac catheterization vs conservative medical therapy.  He demonstrates clear understanding and has elected to continue medical treatment and avoid invasive therapy at this time.  - Echocardiogram 8/7/20 demonstrated normal LV fxn (E 50-55%, basal inferolateral segment is akinetic and mid inferolateral segment is hypokinetic , diastolic dysfxn, moderate asymmetric LVH, dilatation of aortic root 4.8 cm & ascending aorta 4.8 cm, mild AR, mild MR, mild CO, mild TR, moderate pulmonary HT  - Carotid duplex scan (8/7/20 demonstrated moderate 50-65% stenosis bilaterally  - Rhythm stable, BP improving.  Will treat with Toprol XL 25 daily & Losartan 100 daily for now and titrate PRN  - ASA 81 daily in place  - Simvastatin 80 daily in place  - Plan for discharge home today with outpatient follow-up at [facility] in 1 week.  Please call with any cardiovascular questions/issues.  He understands to return to the ER with any recurrent chest pain or SOB symptoms.    Denny Mackey MD

## 2021-09-06 NOTE — DISCHARGE NOTE PROVIDER - NSDCMRMEDTOKEN_GEN_ALL_CORE_FT
aspirin 81 mg oral tablet: 1 tab(s) orally once a day  brimonidine 0.2% ophthalmic solution: 1 drop(s) to each affected eye 2 times a day  finasteride 5 mg oral tablet: 1 tab(s) orally once a day  folic acid 1 mg oral tablet: 1 tab(s) orally once a day  latanoprost ophthalmic:  to each affected eye   losartan 100 mg oral tablet: 1 tab(s) orally once a day  metFORMIN 500 mg oral tablet: 1 tab(s) orally 2 times a day  omeprazole 20 mg oral delayed release capsule: 1 cap(s) orally once a day  simvastatin 80 mg oral tablet: 1 tab(s) orally once a day (at bedtime)  Timolol Maleate, long-acting 0.5% ophthalmic solution: 1 drop(s) to each affected eye once a day (in the morning)

## 2021-09-06 NOTE — DISCHARGE NOTE PROVIDER - NSDCCPCAREPLAN_GEN_ALL_CORE_FT
PRINCIPAL DISCHARGE DIAGNOSIS  Diagnosis: Non-ST elevation MI (NSTEMI)  Assessment and Plan of Treatment: Make sure you take Aspirin and Simvastatin as advised. Seek medical attention immediately for any new chest pain, shortness of breath, palpitations. Follow up with your Cardiologist

## 2021-09-06 NOTE — DISCHARGE NOTE PROVIDER - CARE PROVIDER_API CALL
Denny Mackey)  Cardiology; Internal Medicine  [address]  [place] [address]  Phone: [phone]  Fax: [phone]  Follow Up Time: 1-3 days    Arthur Russell)  Cardiovascular Disease; Internal Medicine  [address], [address]  [place] [address]  Phone: [phone]  Fax: [phone]  Follow Up Time: 1-3 days

## 2021-09-06 NOTE — DISCHARGE NOTE PROVIDER - HOSPITAL COURSE
Brief Hospital course   88 y/o M w/ PMH of CAD s/p CABG 1995 w/ subsequent PCI, ascending thoracic aortic aneurysm (4.8 cm), DMII, GERD, HTN, HLD and prolonged 1st degree AV block admitted with epigastric pain, found to have rising troponin levels, started on IV heparin (peaked at 0.36, now downtrending). No chest pain or SOB. EKG w/o dynamic ischemic /  changes. Seen by Cardiology,  at this time he is planned for dsc home with outpt f/u with [facility]. Has been advised t seek medical attention immediately for chest pain, SOB, palpitations.,       Discharge exam :  CONSTITUTIONAL: NAD, well-developed, well-groomed  ENMT: Moist oral mucosa, no pharyngeal injection or exudates; normal dentition  RESPIRATORY: Normal respiratory effort; lungs are clear to auscultation bilaterally  CARDIOVASCULAR: Regular rate and rhythm, normal S1 and S2, no murmur/rub/gallop; No lower extremity edema; Peripheral pulses are 2+ bilaterally  ABDOMEN: Nontender to palpation, normoactive bowel sounds, no rebound/guarding; No hepatosplenomegaly  MUSCLOSKELETAL:  Normal gait; no clubbing or cyanosis of digits; no joint swelling or tenderness to palpation  PSYCH: A+O to person, place, and time; affect appropriate  NEUROLOGY: CN 2-12 are intact and symmetric; no gross sensory deficits;   SKIN: No rashes; no palpable lesions    Pertinent labs and imaging :  9/5/21 CXR  IMPRESSION:  No acute radiographic findings and no change      Time spent on dsc : 40 minutes

## 2021-10-12 ENCOUNTER — TRANSCRIPTION ENCOUNTER (OUTPATIENT)
Age: 86
End: 2021-10-12

## 2022-02-02 ENCOUNTER — APPOINTMENT (OUTPATIENT)
Dept: GASTROENTEROLOGY | Facility: CLINIC | Age: 87
End: 2022-02-02

## 2022-06-18 ENCOUNTER — NON-APPOINTMENT (OUTPATIENT)
Age: 87
End: 2022-06-18

## 2022-07-11 RX ORDER — CHLORHEXIDINE GLUCONATE 213 G/1000ML
1 SOLUTION TOPICAL ONCE
Refills: 0 | Status: COMPLETED | OUTPATIENT
Start: 2022-07-12 | End: 2022-07-12

## 2022-07-12 ENCOUNTER — INPATIENT (INPATIENT)
Facility: HOSPITAL | Age: 87
LOS: 0 days | Discharge: HOME | DRG: 246 | End: 2022-07-13
Attending: INTERNAL MEDICINE | Admitting: INTERNAL MEDICINE
Payer: COMMERCIAL

## 2022-07-12 ENCOUNTER — TRANSCRIPTION ENCOUNTER (OUTPATIENT)
Age: 87
End: 2022-07-12

## 2022-07-12 VITALS — HEIGHT: 63 IN | WEIGHT: 145.06 LBS

## 2022-07-12 DIAGNOSIS — Z95.1 PRESENCE OF AORTOCORONARY BYPASS GRAFT: Chronic | ICD-10-CM

## 2022-07-12 DIAGNOSIS — R06.9 UNSPECIFIED ABNORMALITIES OF BREATHING: ICD-10-CM

## 2022-07-12 DIAGNOSIS — Z95.5 PRESENCE OF CORONARY ANGIOPLASTY IMPLANT AND GRAFT: Chronic | ICD-10-CM

## 2022-07-12 LAB
A1C WITH ESTIMATED AVERAGE GLUCOSE RESULT: 6.2 % — HIGH (ref 4–5.6)
ALBUMIN SERPL ELPH-MCNC: 3.9 G/DL — SIGNIFICANT CHANGE UP (ref 3.3–5.2)
ALP SERPL-CCNC: 69 U/L — SIGNIFICANT CHANGE UP (ref 40–120)
ALT FLD-CCNC: 25 U/L — SIGNIFICANT CHANGE UP
ANION GAP SERPL CALC-SCNC: 13 MMOL/L — SIGNIFICANT CHANGE UP (ref 5–17)
AST SERPL-CCNC: 19 U/L — SIGNIFICANT CHANGE UP
BASOPHILS # BLD AUTO: 0.02 K/UL — SIGNIFICANT CHANGE UP (ref 0–0.2)
BASOPHILS NFR BLD AUTO: 0.3 % — SIGNIFICANT CHANGE UP (ref 0–2)
BILIRUB SERPL-MCNC: 0.5 MG/DL — SIGNIFICANT CHANGE UP (ref 0.4–2)
BLD GP AB SCN SERPL QL: SIGNIFICANT CHANGE UP
BUN SERPL-MCNC: 21.8 MG/DL — HIGH (ref 8–20)
CALCIUM SERPL-MCNC: 9.4 MG/DL — SIGNIFICANT CHANGE UP (ref 8.6–10.2)
CHLORIDE SERPL-SCNC: 101 MMOL/L — SIGNIFICANT CHANGE UP (ref 98–107)
CHOLEST SERPL-MCNC: 152 MG/DL — SIGNIFICANT CHANGE UP
CO2 SERPL-SCNC: 25 MMOL/L — SIGNIFICANT CHANGE UP (ref 22–29)
CREAT SERPL-MCNC: 0.78 MG/DL — SIGNIFICANT CHANGE UP (ref 0.5–1.3)
EGFR: 85 ML/MIN/1.73M2 — SIGNIFICANT CHANGE UP
EOSINOPHIL # BLD AUTO: 0.08 K/UL — SIGNIFICANT CHANGE UP (ref 0–0.5)
EOSINOPHIL NFR BLD AUTO: 1.3 % — SIGNIFICANT CHANGE UP (ref 0–6)
ESTIMATED AVERAGE GLUCOSE: 131 MG/DL — HIGH (ref 68–114)
GLUCOSE SERPL-MCNC: 132 MG/DL — HIGH (ref 70–99)
HCT VFR BLD CALC: 39.6 % — SIGNIFICANT CHANGE UP (ref 39–50)
HDLC SERPL-MCNC: 49 MG/DL — SIGNIFICANT CHANGE UP
HGB BLD-MCNC: 13.1 G/DL — SIGNIFICANT CHANGE UP (ref 13–17)
IMM GRANULOCYTES NFR BLD AUTO: 0.3 % — SIGNIFICANT CHANGE UP (ref 0–1.5)
LIPID PNL WITH DIRECT LDL SERPL: 87 MG/DL — SIGNIFICANT CHANGE UP
LYMPHOCYTES # BLD AUTO: 2.24 K/UL — SIGNIFICANT CHANGE UP (ref 1–3.3)
LYMPHOCYTES # BLD AUTO: 36.5 % — SIGNIFICANT CHANGE UP (ref 13–44)
MAGNESIUM SERPL-MCNC: 2 MG/DL — SIGNIFICANT CHANGE UP (ref 1.6–2.6)
MCHC RBC-ENTMCNC: 30.5 PG — SIGNIFICANT CHANGE UP (ref 27–34)
MCHC RBC-ENTMCNC: 33.1 GM/DL — SIGNIFICANT CHANGE UP (ref 32–36)
MCV RBC AUTO: 92.3 FL — SIGNIFICANT CHANGE UP (ref 80–100)
MONOCYTES # BLD AUTO: 0.43 K/UL — SIGNIFICANT CHANGE UP (ref 0–0.9)
MONOCYTES NFR BLD AUTO: 7 % — SIGNIFICANT CHANGE UP (ref 2–14)
NEUTROPHILS # BLD AUTO: 3.35 K/UL — SIGNIFICANT CHANGE UP (ref 1.8–7.4)
NEUTROPHILS NFR BLD AUTO: 54.6 % — SIGNIFICANT CHANGE UP (ref 43–77)
NON HDL CHOLESTEROL: 103 MG/DL — SIGNIFICANT CHANGE UP
PLATELET # BLD AUTO: 216 K/UL — SIGNIFICANT CHANGE UP (ref 150–400)
POTASSIUM SERPL-MCNC: 4.6 MMOL/L — SIGNIFICANT CHANGE UP (ref 3.5–5.3)
POTASSIUM SERPL-SCNC: 4.6 MMOL/L — SIGNIFICANT CHANGE UP (ref 3.5–5.3)
PROT SERPL-MCNC: 7.4 G/DL — SIGNIFICANT CHANGE UP (ref 6.6–8.7)
RBC # BLD: 4.29 M/UL — SIGNIFICANT CHANGE UP (ref 4.2–5.8)
RBC # FLD: 13.4 % — SIGNIFICANT CHANGE UP (ref 10.3–14.5)
SODIUM SERPL-SCNC: 139 MMOL/L — SIGNIFICANT CHANGE UP (ref 135–145)
TRIGL SERPL-MCNC: 81 MG/DL — SIGNIFICANT CHANGE UP
WBC # BLD: 6.14 K/UL — SIGNIFICANT CHANGE UP (ref 3.8–10.5)
WBC # FLD AUTO: 6.14 K/UL — SIGNIFICANT CHANGE UP (ref 3.8–10.5)

## 2022-07-12 PROCEDURE — 93010 ELECTROCARDIOGRAM REPORT: CPT | Mod: 76

## 2022-07-12 RX ORDER — ASPIRIN/CALCIUM CARB/MAGNESIUM 324 MG
81 TABLET ORAL ONCE
Refills: 0 | Status: COMPLETED | OUTPATIENT
Start: 2022-07-12 | End: 2022-07-12

## 2022-07-12 RX ORDER — MORPHINE SULFATE 50 MG/1
2 CAPSULE, EXTENDED RELEASE ORAL ONCE
Refills: 0 | Status: DISCONTINUED | OUTPATIENT
Start: 2022-07-12 | End: 2022-07-12

## 2022-07-12 RX ORDER — BRIMONIDINE TARTRATE 2 MG/MG
1 SOLUTION/ DROPS OPHTHALMIC
Refills: 0 | Status: DISCONTINUED | OUTPATIENT
Start: 2022-07-12 | End: 2022-07-13

## 2022-07-12 RX ORDER — FOLIC ACID 0.8 MG
1 TABLET ORAL DAILY
Refills: 0 | Status: DISCONTINUED | OUTPATIENT
Start: 2022-07-12 | End: 2022-07-13

## 2022-07-12 RX ORDER — POTASSIUM CHLORIDE 20 MEQ
40 PACKET (EA) ORAL ONCE
Refills: 0 | Status: COMPLETED | OUTPATIENT
Start: 2022-07-12 | End: 2022-07-12

## 2022-07-12 RX ORDER — LOSARTAN POTASSIUM 100 MG/1
50 TABLET, FILM COATED ORAL DAILY
Refills: 0 | Status: DISCONTINUED | OUTPATIENT
Start: 2022-07-12 | End: 2022-07-13

## 2022-07-12 RX ORDER — FUROSEMIDE 40 MG
20 TABLET ORAL ONCE
Refills: 0 | Status: COMPLETED | OUTPATIENT
Start: 2022-07-12 | End: 2022-07-12

## 2022-07-12 RX ORDER — NITROGLYCERIN 6.5 MG
0.4 CAPSULE, EXTENDED RELEASE ORAL ONCE
Refills: 0 | Status: COMPLETED | OUTPATIENT
Start: 2022-07-12 | End: 2022-07-12

## 2022-07-12 RX ORDER — CLOPIDOGREL BISULFATE 75 MG/1
75 TABLET, FILM COATED ORAL DAILY
Refills: 0 | Status: DISCONTINUED | OUTPATIENT
Start: 2022-07-13 | End: 2022-07-13

## 2022-07-12 RX ORDER — SODIUM CHLORIDE 9 MG/ML
250 INJECTION INTRAMUSCULAR; INTRAVENOUS; SUBCUTANEOUS ONCE
Refills: 0 | Status: COMPLETED | OUTPATIENT
Start: 2022-07-12 | End: 2022-07-12

## 2022-07-12 RX ORDER — OMEPRAZOLE 10 MG/1
1 CAPSULE, DELAYED RELEASE ORAL
Qty: 0 | Refills: 0 | DISCHARGE

## 2022-07-12 RX ORDER — ATORVASTATIN CALCIUM 80 MG/1
40 TABLET, FILM COATED ORAL AT BEDTIME
Refills: 0 | Status: DISCONTINUED | OUTPATIENT
Start: 2022-07-12 | End: 2022-07-13

## 2022-07-12 RX ORDER — ASPIRIN/CALCIUM CARB/MAGNESIUM 324 MG
81 TABLET ORAL DAILY
Refills: 0 | Status: DISCONTINUED | OUTPATIENT
Start: 2022-07-12 | End: 2022-07-13

## 2022-07-12 RX ORDER — TIMOLOL 0.5 %
1 DROPS OPHTHALMIC (EYE) DAILY
Refills: 0 | Status: DISCONTINUED | OUTPATIENT
Start: 2022-07-12 | End: 2022-07-13

## 2022-07-12 RX ADMIN — Medication 40 MILLIEQUIVALENT(S): at 21:34

## 2022-07-12 RX ADMIN — Medication 0.4 MILLIGRAM(S): at 16:10

## 2022-07-12 RX ADMIN — CHLORHEXIDINE GLUCONATE 1 APPLICATION(S): 213 SOLUTION TOPICAL at 17:43

## 2022-07-12 RX ADMIN — MORPHINE SULFATE 2 MILLIGRAM(S): 50 CAPSULE, EXTENDED RELEASE ORAL at 18:46

## 2022-07-12 RX ADMIN — Medication 20 MILLIGRAM(S): at 16:10

## 2022-07-12 RX ADMIN — Medication 81 MILLIGRAM(S): at 12:48

## 2022-07-12 RX ADMIN — MORPHINE SULFATE 2 MILLIGRAM(S): 50 CAPSULE, EXTENDED RELEASE ORAL at 16:11

## 2022-07-12 RX ADMIN — Medication 30 MILLILITER(S): at 18:15

## 2022-07-12 RX ADMIN — MORPHINE SULFATE 2 MILLIGRAM(S): 50 CAPSULE, EXTENDED RELEASE ORAL at 18:15

## 2022-07-12 RX ADMIN — ATORVASTATIN CALCIUM 40 MILLIGRAM(S): 80 TABLET, FILM COATED ORAL at 21:34

## 2022-07-12 RX ADMIN — LOSARTAN POTASSIUM 50 MILLIGRAM(S): 100 TABLET, FILM COATED ORAL at 21:34

## 2022-07-12 RX ADMIN — MORPHINE SULFATE 2 MILLIGRAM(S): 50 CAPSULE, EXTENDED RELEASE ORAL at 16:33

## 2022-07-12 RX ADMIN — SODIUM CHLORIDE 250 MILLILITER(S): 9 INJECTION INTRAMUSCULAR; INTRAVENOUS; SUBCUTANEOUS at 12:48

## 2022-07-12 RX ADMIN — Medication 20 MILLIGRAM(S): at 20:48

## 2022-07-12 NOTE — H&P PST ADULT - REASON FOR ADMISSION
Patient for LHC secondary to changed on his TTE and an abnormal NST revealing moderate lateral ischemia.

## 2022-07-12 NOTE — DISCHARGE NOTE PROVIDER - CARE PROVIDER_API CALL
Abraham Rodarte)  Cardiovascular Disease; Interventional Cardiology  [address] [place] [address]  Phone: [phone]  Fax: [phone]  Follow Up Time:    Krista Chino)  Cardiovascular Disease  [address] [place] [address]  Phone: [phone]  Fax: [phone]  Follow Up Time:

## 2022-07-12 NOTE — ASU PATIENT PROFILE, ADULT - NSICDXPASTMEDICALHX_GEN_ALL_CORE_FT
PAST MEDICAL HISTORY:  Arthritis     Diabetes     GERD (gastroesophageal reflux disease)     High cholesterol     HTN (hypertension)     MI, old

## 2022-07-12 NOTE — ASU PATIENT PROFILE, ADULT - FALL HARM RISK - HARM RISK INTERVENTIONS

## 2022-07-12 NOTE — H&P PST ADULT - NSICDXPASTMEDICALHX_GEN_ALL_CORE_FT
PAST MEDICAL HISTORY:  Diabetes     High cholesterol     HTN (hypertension)     MI, old      PAST MEDICAL HISTORY:  Arthritis     Diabetes     GERD (gastroesophageal reflux disease)     High cholesterol     HTN (hypertension)     MI, old

## 2022-07-12 NOTE — H&P PST ADULT - ASSESSMENT
89 year old male with h/o CABG (1995) followed by PATRIC with abnormal stress test for LHC to assess grafts.    -NPO status maintained  -ASpirin given  -NS bolus given (pre hydration)  -consent to be signed by IC 89 year old male who c/o GI symptoms with h/o CABG (1995) followed by PATRIC with abnormal stress test for LHC to assess grafts.    -NPO status maintained  -ASpirin given  -NS bolus given (pre hydration)  -consent to be signed by IC

## 2022-07-12 NOTE — CONSULT NOTE ADULT - SUBJECTIVE AND OBJECTIVE BOX
Patient is a 89y old  Male who presents with a chief complaint of Increased SOB on minimal exertion and chest discomfort.   Patient had a recent abnormal NST revealing moderate lateral ischemia and a decrease in LVEF on recent echocardiogram.    HPI:  This is a 89 yo male wit a PMH of CAD sp CABG in 1995 at [facility] with Dr Shen, DM, Right sided CHF, PATRIC in 1997 at [facility], PAD, 1st degree AV block, thoracic aortic aneurysm, B/L carotid artery stenosis, HTN, HLD, and recent COVID infection in March 2022.  He was treated for bronchitis in March and was seen by a pulmonologist. At that time his chest x-ray revealed atelectasis and he was started on steroids.  Secondary to c/o chest discomfort he was sent for an echocardiogram and a NST. His TTE revealed a dilated aorta, EF 45%, mitral sclerosis mild AI and mild MR and the Nuclear Stress Test revealed moderate lateral ischemia.      Assessment of LVEF:       EF: 45%       Assessed by: TTE       Date: 5/16/22    Prior Cardiac Interventions (LHC, stents, CABG):       PCI's (Date, Stents, Vessels): PATRIC in 1997 at [facility] (unable to get report)       CABG (Date, Grafts): 1995 at [facility] (unable to get report)    Noninvasive Testing:   Stress Test: Date: 6/9/22       Protocol: Regadenoson Sestamibi Stress Test         EKG Changes: Stress induced PVCs          Myocardial Imaging: Lg zone of decreased radio tracer uptake in the lateral segments.  EF 45%. Mildly reduced LV systolic function.          Echo (Date, Findings):  5/16/22  Mildly reduced LV systolic function. EF 45%, Regional wall motion abnormality, LV filling pattern abnormal from impaired relaxation. Diastolic dysfunction is present (grade 3), mild valvular abnormalities. Dilated aortic root..        PAST MEDICAL & SURGICAL HISTORY:  High cholesterol  Diabetes  MI, old  HTN (hypertension)  GERD (gastroesophageal reflux disease)  Arthritis  S/P CABG (coronary artery bypass graft)  History of coronary artery stent placement        Allergies  No Known Allergies        MEDICATIONS  (STANDING):  aspirin  chewable 81 milliGRAM(s) Oral daily  Pravastatin 80 mg PO daily  brimonidine 0.2% Ophthalmic Solution 1 Drop(s) Both EYES two times a day  folic acid 1 milliGRAM(s) Oral daily  furosemide 40 mg PO daily  losartan 50 milliGRAM(s) Oral daily  potassium chloride  Tablet ER 20 milliEquivalent(s) Oral daily  timolol 0.5% Solution 1 Drop(s) Both EYES daily  Metformin 500 mg PO daily      FAMILY HISTORY:  FH: hypertension      REVIEW OF SYSTEMS:  CONSTITUTIONAL: No fever, weight loss, or fatigue  EYES: No eye pain, visual disturbances, or discharge  ENMT:  No difficulty hearing, tinnitus, vertigo; No sinus or throat pain  RESPIRATORY: No cough, wheezing, chills or hemoptysis; + Shortness of Breath  CARDIOVASCULAR: + chest pain, No palpitations, passing out, dizziness  GASTROINTESTINAL: No abdominal or epigastric pain. No nausea, vomiting, or hematemesis; No diarrhea or constipation. No melena or hematochezia.  GENITOURINARY: No dysuria, frequency, hematuria, or incontinence  NEUROLOGICAL: No headaches, memory loss, loss of strength, numbness, or tremors  SKIN: No itching, burning, rashes, or lesions   ENDOCRINE: No heat or cold intolerance; No hair loss  MUSCULOSKELETAL: No joint pain or swelling; No muscle, back, or extremity pain  PSYCHIATRIC: No depression, anxiety, mood swings, or difficulty sleeping  HEME/LYMPH: No easy bruising, or bleeding gums  ALLERY AND IMMUNOLOGIC: No hives or eczema	    Vital Signs Last 24 Hrs  T(C): 36.3 (12 Jul 2022 12:17), Max: 36.3 (12 Jul 2022 12:17)  T(F): 97.4 (12 Jul 2022 12:17), Max: 97.4 (12 Jul 2022 12:17)  HR: 59 (12 Jul 2022 18:50) (59 - 93)  BP: 162/69 (12 Jul 2022 18:50) (146/65 - 180/89)  BP(mean): --  RR: 17 (12 Jul 2022 18:50) (17 - 21)  SpO2: 96% (12 Jul 2022 18:50) (87% - 100%)    Parameters below as of 12 Jul 2022 18:50  Patient On (Oxygen Delivery Method): nasal cannula  O2 Flow (L/min): 4      Daily Height in cm: 167.64 (12 Jul 2022 12:17)    Daily     I&O's Detail    12 Jul 2022 07:01  -  12 Jul 2022 19:36  --------------------------------------------------------  IN:  Total IN: 0 mL    OUT:    Voided (mL): 1500 mL  Total OUT: 1500 mL    Total NET: -1500 mL          PHYSICAL EXAM:  Appearance: Normal, well nourished	  HEENT:   Normal oral mucosa, PERRL, EOMI, sclera non-icteric	  Lymphatic: No cervical lymphadenopathy  Cardiovascular: Normal S1 S2, No JVD, No cardiac murmurs, No carotid bruits, No peripheral edema  Respiratory: Lungs clear to auscultation	  Psychiatry: A & O x 3, Mood & affect appropriate  Gastrointestinal:  Soft, Non-tender, + BS, no bruits	  Skin: No rashes, No ecchymoses, No cyanosis  Neurologic: Grossly non-focal with full strength in all four extremities  Extremities: Normal range of motion, No clubbing, cyanosis, + B/L LE edema  Vascular: Peripheral pulses palpable bilaterally      INTERPRETATION OF TELEMETRY: NSR      LABS:                        13.1   6.14  )-----------( 216      ( 12 Jul 2022 12:15 )             39.6     07-12    139  |  101  |  21.8<H>  ----------------------------<  132<H>  4.6   |  25.0  |  0.78    Ca    9.4      12 Jul 2022 12:15  Mg     2.0     07-12    TPro  7.4  /  Alb  3.9  /  TBili  0.5  /  DBili  x   /  AST  19  /  ALT  25  /  AlkPhos  69  07-12            I&O's Summary    12 Jul 2022 07:01  -  12 Jul 2022 19:36  --------------------------------------------------------  IN: 0 mL / OUT: 1500 mL / NET: -1500 mL      BNP  RADIOLOGY & ADDITIONAL STUDIES:    Assessment:  HPI:  This is a 89 yo male wit a PMH of CAD sp CABG in 1995 at [facility] with FRANCESCA Bhatt, Right sided CHF, PATRIC in 1997 at [facility], PAD, 1st degree AV block, thoracic aortic aneurysm, B/L carotid artery stenosis, HTN, HLD, and recent COVID infection in March 2022.  He was treated for bronchitis in March and was seen by a pulmonologist. At that time his chest x-ray revealed atelectasis and he was started on steroids.  Secondary to c/o chest discomfort he was sent for an echocardiogram and a NST. His TTE revealed a dilated aorta, EF 45%, mitral sclerosis mild AI and mild MR and the Nuclear Stress Test revealed moderate lateral ischemia.    Due to his significasnt cardiac history, current symptoms, recent positive NST, and recent decrease in LV function on echo I am recommending a Left heart cardiac cath for further evaluation of cardiac ischemia.    Plan:  Left heart Cardiac catheterization and possible percutaneous intervention recommended.  Risks, benefits, and alternatives reviewed.  Risks including but not limited to MI, death, stroke, bleeding, infection, vessel injury, hematoma, renal failure, allergic reaction, urgent open heart surgery, restenosis and stent thrombosis were reviewed.  All questions answered.  Patient is agreeable to proceed.

## 2022-07-12 NOTE — PROGRESS NOTE ADULT - SUBJECTIVE AND OBJECTIVE BOX
Cardiology Nurse Practitioner Note    Pt with c/o of mild SOB.  Remains on 2l NC. VSS  -Lasix 20 IV and potassium ordered  -Endorsed to night PA to follow up

## 2022-07-12 NOTE — DISCHARGE NOTE PROVIDER - NSDCMRMEDTOKEN_GEN_ALL_CORE_FT
aspirin 81 mg oral tablet: 1 tab(s) orally once a day  brimonidine 0.2% ophthalmic solution: 1 drop(s) to each affected eye 2 times a day  folic acid 1 mg oral tablet: 1 tab(s) orally once a day  losartan 50 mg oral tablet: 1 tab(s) orally once a day  metFORMIN 500 mg oral tablet: 1 tab(s) orally 2 times a day  pravastatin 80 mg oral tablet: 1 tab(s) orally once a day  Timolol Maleate, long-acting 0.5% ophthalmic solution: 1 drop(s) to each affected eye once a day (in the morning)   aluminum hydroxide-magnesium hydroxide 200 mg-200 mg/5 mL oral suspension: 30 milliliter(s) orally every 4 hours, As needed, Dyspepsia  aspirin 81 mg oral tablet: 1 tab(s) orally once a day  brimonidine 0.2% ophthalmic solution: 1 drop(s) to each affected eye 2 times a day  folic acid 1 mg oral tablet: 1 tab(s) orally once a day  Lipitor 40 mg oral tablet: 1 tab(s) orally once a day (at bedtime)  losartan 50 mg oral tablet: 1 tab(s) orally once a day  metFORMIN 500 mg oral tablet: 1 tab(s) orally 2 times a day  Hold X 2 days post procedure. Restart on 7/15/22  Plavix 75 mg oral tablet: 1 tab(s) orally once a day  Timolol Maleate, long-acting 0.5% ophthalmic solution: 1 drop(s) to each affected eye once a day (in the morning)

## 2022-07-12 NOTE — DISCHARGE NOTE PROVIDER - NSDCCPCAREPLAN_GEN_ALL_CORE_FT
PRINCIPAL DISCHARGE DIAGNOSIS  Diagnosis: CAD (coronary artery disease)  Assessment and Plan of Treatment: s/p PCI  ASA/Plavix       PRINCIPAL DISCHARGE DIAGNOSIS  Diagnosis: CAD (coronary artery disease)  Assessment and Plan of Treatment: Coronary artery disease is the buildup of plaque in the arteries that supply oxygen-rich blood to your heart. Plaque causes a narrowing or blockage that could result in a heart attack. Symptoms include chest pain or discomfort, shortness of breath, dizziness, palpitations, fatigue or reduced exercise tolerance. .  Go to the ED with any acute onset of chest pain, palpitations, shortness of breath or dizziness. Do NOT miss a dose or stop taking your Aspirin and Plavix, these medications keep your stent open and prevent a heart attack. If anyone tells you to stop these medications, speak to your cardiologist immediately.  Managing risk factors will help keep your stent open and prevent future blockages, risk factors may include: high blood pressure, high cholesterol, diabetes, obesity, sedentary life style and smoking.    Your diet should be low in fat, cholesterol, salt and carbohydrates, increase fruits (caution if diabetic), vegetables and whole grains/fiber rich foods.   Take all your cardiac  medications as prescribed.    Exercise is a very important factor in heart health. Once your post procedure restrictions have passed, you should engage in heart healthy, aerobic exercise. Be sure to have clearance from your cardiologist. Cardiac rehab programs could be extremely beneficial and your cardiologist could help set this up.   Follow up with your cardiologist within 1-2 weeks after your procedure.   Call your cardiologist or our unit ([phone]) with any questions or concerns that may arise.      SECONDARY DISCHARGE DIAGNOSES  Diagnosis: HTN (hypertension)  Assessment and Plan of Treatment: Continue to take antihypertensive medications as prescribed. Obtain a home blood pressure monitor (at any pharmacy or medical supply store) and monitor blood pressure trends. Call your doctor if you note you blood pressure to be running much higher or lower than usual. Limit salt intake.    Diagnosis: HLD (hyperlipidemia)  Assessment and Plan of Treatment: Your diet should be low in fat, cholesterol, salt and carbohydrates, increase fruits (caution if diabetic), vegetables and whole grains/fiber rich foods. Take your cholesterol lowering medications as prescribed. Routine follow up lipid panels    Diagnosis: Diabetes  Assessment and Plan of Treatment: Follow a carbohydrate controlled diet. Monitor your blood sugar levels before meals and at bedtime and keep a log of your values. Follow up for routine HbA1C levels which indicate your sugar control over the previous few months. You should ensure you have a provider that is closely monitoring your diabetes and keeping your sugar levels well controlled. Weight loss, carb controlled healthy diet and exercise can help improve your glucose control. It is also improtant to monitor for low sugar levels as levels too low (below 70) can be very dangerous. You should always carry sugar tablets or something to help bring the sugar level up if it drops too low. Symptoms of low sugar levls are: fatigue, lightheaded, mental confusion, fainting, sweating, shaking and excessive hunger.    Diagnosis: Chronic CHF  Assessment and Plan of Treatment: Take your medications as prescribed. Monitor for symptoms of heart failure: increasing shortness of breath, inability to lie flat, swelling in your legs, increased abdomiinal girth or bloating, any significant increase in weight, increasing dizziness or fatigue and notify your provider.

## 2022-07-12 NOTE — PROGRESS NOTE ADULT - SUBJECTIVE AND OBJECTIVE BOX
Now s/p LHC via right groin with angioseal closure device . Procedure performed by Dr. Rodarte.  RFA access site stable, no bleed/hematoma, distal pulse +.  Pt arrived to recovery with O2 sats 70's c/o chest pain.  NRB placed.  EKG with elevated ST elevation V4-6    Procedure results:    Medication received during procedure:  Versed:  Fentanyl:  Heparin:  Omnipaque:    Exam:   Neuro: A&O X.  MILLS=  CV: RRR  Lungs: CTA  Ext: + palp pulses.  Vascular access: ********.  Site stable. No bleeding/hematoma/ecchymosis.  + cap refill     Plan:  -Formal cath report pending  -Post procedure management/monitoring per protocol  -Access site precautions  -Radial compression band removal at ***  -Bedrest x ***hours post procedure  -Labs and EKG in am  -Repeat ECG if any clinical indication or change on tele  -NS 250mL bolus post cath ******  -Continue current medical therapy  -Dual anti platelet therapy with aspirin/plavix **  -Cont BB with Toprol 50mg po daily **  -Cont statin therapy with Lipitor 10mg po qHS **  -Educated regarding strict adherence with DAPT   -Educated regarding post procedure management and care  -Discussed the importance of RF modification  -Cardiac rehab info provided/referral and communication to cardiac rehab completed  -F/U outpt in 1-2 weeks with Cardiologist  ***  -DISPO: *** Plan for D/C in am if remains HDS, ECG and labs in am stable and without complications     Now s/p LHC via right groin with angioseal closure device . Procedure performed by Dr. Rodarte.  RFA access site stable, no bleed/hematoma, distal pulse +.  Pt arrived to recovery with O2 sats 70's c/o chest pain.  NRB placed.  EKG with elevated ST elevation V4-6.  Dr. Rodarte at bedside.  NTG, lasix 20 iv and morphine given.  After approx 10 minutes pt feeling better.  Decreased to NC, pt voided.  Repeat EKG with resolving STs    Procedure results:    Medication received during procedure:  Fentanyl: 50 mcg  Heparin: 8000 u  Omnipaque:164ml  Plavix 600 mg    Exam:   Neuro: A&O X.  MILLS=  CV: RRR  Lungs: CTA  Ext: + palp pulses.  Vascular access: RFA with angioseal.  Site stable. No bleeding/hematoma/ecchymosis.  + cap refill     Plan:  -Formal cath report pending  -Post procedure management/monitoring per protocol  -Access site precautions  -Bedrest x 3 hours post procedure  -Labs and EKG in am  -Repeat ECG if any clinical indication or change on tele  -No post hydration   -Continue current medical therapy  -Dual anti platelet therapy with aspirin/plavix **  -Cont BB with Toprol 50mg po daily **  -Cont statin therapy with Lipitor 10mg po qHS **  -Educated regarding strict adherence with DAPT   -Educated regarding post procedure management and care  -Discussed the importance of RF modification  -Cardiac rehab info provided/referral and communication to cardiac rehab completed  -F/U outpt in 1-2 weeks with Cardiologist Dr. Rodarte  -DISPO: Plan for D/C in am if remains HDS, ECG and labs in am stable and without complications     Now s/p LHC via right groin with angioseal closure device . Procedure performed by Dr. Rodarte.  RFA access site stable, no bleed/hematoma, distal pulse +.  Pt arrived to recovery with O2 sats 70's c/o chest pain.  NRB placed.  EKG with elevated ST elevation V4-6.  Dr. Rodarte at bedside.  NTG, lasix 20 iv and morphine given.  After approx 10 minutes pt feeling better.  Decreased to NC, pt voided.  Repeat EKG with resolving STs    Procedure results: S/P PCI with PATRIC X1 RPL and PATRIC X3 RCA, all grafts are down except LIMA-->LAD.  Full report to follow    Medication received during procedure:  Fentanyl: 50 mcg  Heparin: 8000 u  Omnipaque:164ml  Plavix 600 mg    Exam:   Neuro: A&O X.  MILLS=  CV: RRR  Lungs: CTA  Ext: + palp pulses.  Vascular access: RFA with angioseal.  Site stable. No bleeding/hematoma/ecchymosis.  + cap refill     Plan:  -Formal cath report pending  -Post procedure management/monitoring per protocol  -Access site precautions  -Bedrest x 3 hours post procedure  -Labs and EKG in am  -Repeat ECG if any clinical indication or change on tele  -No post hydration   -Continue current medical therapy  -Dual anti platelet therapy with aspirin/plavix   -Cont BB with Toprol 50mg po daily **  -Cont statin therapy with Lipitor 10mg po qHS **  -Educated regarding strict adherence with DAPT   -Educated regarding post procedure management and care  -Discussed the importance of RF modification  -Cardiac rehab info provided/referral and communication to cardiac rehab completed  -F/U outpt in 1-2 weeks with Cardiologist Dr. Rodarte  -DISPO: Plan for D/C in am if remains HDS, ECG and labs in am stable and without complications     Now s/p LHC via right groin with angioseal closure device . Procedure performed by Dr. Rodarte.  RFA access site stable, no bleed/hematoma, distal pulse +.  Pt arrived to recovery with O2 sats 70's c/o chest pain.  NRB placed.  EKG with elevated ST elevation V4-6.  Dr. Rodarte at bedside.  NTG, lasix 20 iv and morphine given.  After approx 10 minutes pt feeling better.  Decreased to NC, pt voided.  Repeat EKG with resolving STs    Procedure results: S/P PCI with PATRIC X1 RPL and PATRIC X3 RCA, all grafts are down except LIMA-->LAD.  Full report to follow    Medication received during procedure:  Fentanyl: 50 mcg  Heparin: 8000 u  Omnipaque:164ml  Plavix 600 mg    Exam:   Neuro: A&O X.  MILLS=  CV: RRR  Lungs: CTA  Ext: + palp pulses.  Vascular access: RFA with angioseal.  Site stable. No bleeding/hematoma/ecchymosis.  + cap refill     Plan:  -Formal cath report pending  -Post procedure management/monitoring per protocol  -Access site precautions  -Bedrest x 3 hours post procedure  -Labs and EKG in am  -Repeat ECG if any clinical indication or change on tele  -No post hydration   -Continue current medical therapy  -Dual anti platelet therapy with aspirin/plavix   -Switch to lipitor 40 mg per Dr. Rodarte  -Educated regarding strict adherence with DAPT   -Educated regarding post procedure management and care  -Discussed the importance of RF modification  -Cardiac rehab info provided/referral and communication to cardiac rehab completed  -F/U outpt in 1-2 weeks with Cardiologist Dr. Rodarte  -DISPO: Plan for D/C in am if remains HDS, ECG and labs in am stable and without complications

## 2022-07-12 NOTE — DISCHARGE NOTE PROVIDER - NSDCCPTREATMENT_GEN_ALL_CORE_FT
PRINCIPAL PROCEDURE  Procedure: Left heart cardiac cath  Findings and Treatment: PATRIC X 4       PRINCIPAL PROCEDURE  Procedure: Left heart cardiac cath  Findings and Treatment: Dreug eluting stents were placed to you right coronary artery system on 7/12/22.  Go to the ED with any acute onset of chest pain, palpitations, shortness of breath or dizziness.   Managing risk factors will help prevent future blockages, risk factors may include: high blood pressure, high cholesterol, obesity, sedentary life style and smoking.    Your diet should be low in fat, cholesterol, salt and carbohydrates, increase fruits (caution if diabetic), vegetables and whole grains/fiber rich foods.   Take all your cardiac  medications as prescribed.    No heavy lifting, excessine bending or range of motion on affected limb 5-7days.  No submerging the site in water (pool/bath) 5-7days. You may start showering the day after your procedure. Remove the dressing, cleanse the area with plain soap and water and then pat dry. You may place a bandaid for 1-2 days, NO cream,/lotion/medicines on the site.   Bruising and a small nodule at the site is normal. Call your doctor for any bleeding, swelling/hardness, increasing pain or redness, loss of sensation in the hand/leg or discoloration.   If heavy bleeding or large lumps form, hold pressure at the spot and come to the Emergency Room.  Exercise is a very important factor in heart health. Once your post procedure restrictions have passed, you should engage in heart healthy, aerobic exercise. Be sure to have clearance from your cardiologist. Cardiac rehab programs could be extremely beneficial and your cardiologist could help set this up.   Follow up with your cardiologist within 1-2 weeks after your procedure.   Call your cardiologist or our unit ([phone]) with any questions or concerns that may arise.

## 2022-07-12 NOTE — DISCHARGE NOTE PROVIDER - NSDCFUADDINST_GEN_ALL_CORE_FT
No heavy lifting, driving, sex, tub baths, swimming, or any activity that submerges the lower half of the body in water for 48 hours.  Limited walking and stairs for 48 hours.    Change the bandaid after 24 hours and every 24 hours after that.  Keep the puncture site dry and covered with a bandaid until a scab forms.    Observe the site frequently.  If bleeding or a large lump (the size of a golf ball or bigger) occurs lie flat, apply continuous direct pressure just above the puncture site for at least 10 minutes, and notify your physician immediately.  If the bleeding cannot be controlled, call 911 immediately for assistance.  Notify your physician of pain, swelling or any drainage.    Notify your physician immediately if coldness, numbness, discoloration or pain in your foot occurs.   Go to the ED with any acute onset of chest pain, palpitations, shortness of breath or dizziness.   NEVER miss your aspirin and plavix (clopidogrel)  Managing risk factors will help prevent future blockages, risk factors may include: high blood pressure, high cholesterol, obesity, sedentary life style and smoking.    Your diet should be low in fat, cholesterol, salt and carbohydrates, increase fruits (caution if diabetic), vegetables and whole grains/fiber rich foods.   Take all your cardiac  medications as prescribed.    No heavy lifting, excessine bending or range of motion on affected limb 5-7days.  No submerging the site in water (pool/bath) 5-7days. You may start showering the day after your procedure. Remove the dressing, cleanse the area with plain soap and water and then pat dry. You may place a bandaid for 1-2 days, NO cream,/lotion/medicines on the site.   Bruising and a small nodule at the site is normal. Call your doctor for any bleeding, swelling/hardness, increasing pain or redness, loss of sensation in the hand/leg or discoloration.   If heavy bleeding or large lumps form, hold pressure at the spot and come to the Emergency Room.  Exercise is a very important factor in heart health. Once your post procedure restrictions have passed, you should engage in heart healthy, aerobic exercise. Be sure to have clearance from your cardiologist. Cardiac rehab programs could be extremely beneficial and your cardiologist could help set this up.   Follow up with your cardiologist within 1-2 weeks after your procedure.   Call your cardiologist or our unit ([phone]) with any questions or concerns that may arise.

## 2022-07-12 NOTE — H&P PST ADULT - HISTORY OF PRESENT ILLNESS
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  He was treated for bronchitis in March and was seen by a pulmonologist. At that time his chest x-ray revealed atelectasis and he was started on steroids.  Secondary to c/o chest discomfort he was sent for an echocardiogram and a NST. His TTE revealed a dilated aorta, EF 45%, mitral sclerosis mild AI and mild MR and the Nuclear Stress Test revealed moderate lateral ischemia.      ASA 3  Mallampati 2  GFR  Creat  Bleeding Risk  COVID19 - Negative    Symptoms:        Anigina (Class): 3       Ischemic Symptoms: Chest discomfort    Heart Failure:        Systolic/Diastolic/Combined: Combined        NYHA Class (within 2 weeks):     Assessment of LVEF (Must be within 6 months):       EF: 45%       Assessed by: TTE       Date: 5/16/22    Prior Cardiac Interventions (LHC, stents, CABG):       PCI's (Date, Stents, Vessels): PATRIC in 1997 at [facility]       CABG (Date, Grafts): 1995 at [facility]    Noninvasive Testing:   Stress Test: Date: 6/9/22       Protocol: Regadenoson Sestamibi Stress Test       Duration of Exercise: NA       Symptoms: None       EKG Changes: Stress induced PVCs        DTS: NA       Myocardial Imaging: Lg zone of decreased raiotracer uptake in the lateral segments.  EF 45%. Mildly reduced LV systolic function.         Risk Assessment (Low, Medium, High): Medium    Echo (Date, Findings):  5/16/22  Milsly reduced LV systolic function. EF 45%, Regional wall motion abnormality, LV filling pattern abnormal from impaired relaxation. Diastolic dysfunction is present (grade 3), mild valvular abnormalities. Dilated aortic root..     Antianginal Therapies: None       Beta Blockers:         Calcium Channel Blockers:        Long Acting Nitrates:        Ranexa:     Associated Risk Factors:        Frailty Assessment: Mild       Cerebrovascular Disease: N/A       Chronic Lung Disease: N/A       Peripheral Arterial Disease: Yes       Chronic Kidney Disease (if yes, what is GFR): N/A       Uncontrolled Diabetes (if yes, what is HgbA1C or FBS): YES       Poorly Controlled Hypertension (if yes, what is SBP): N/A       Morbid Obesity (if yes, what is BMI): N/A       History of Recent Ventricular Arrhythmia: N/A       Inability to Ambulate Safely: N/A       Need for Therapeutic Anticoagulation: N/A       Antiplatelet or Contrast Allergy: N/A Narrative: This is a 89 yo male wit a PMHs of CAD sp CABG in 1995 at [facility] with Dr Shen, DM, Right sided CHF, PATRIC in 1997 at [facility], PAD, 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  He was treated for bronchitis in March and was seen by a pulmonologist. At that time his chest x-ray revealed atelectasis and he was started on steroids.  Secondary to c/o chest discomfort he was sent for an echocardiogram and a NST. His TTE revealed a dilated aorta, EF 45%, mitral sclerosis mild AI and mild MR and the Nuclear Stress Test revealed moderate lateral ischemia.      ASA 3  Mallampati 2  GFR  Creat  Bleeding Risk  COVID19 - Negative    Symptoms:        Anigina (Class): 3       Ischemic Symptoms: Chest discomfort    Heart Failure:        Systolic/Diastolic/Combined: Combined        NYHA Class (within 2 weeks):     Assessment of LVEF (Must be within 6 months):       EF: 45%       Assessed by: TTE       Date: 5/16/22    Prior Cardiac Interventions (LHC, stents, CABG):       PCI's (Date, Stents, Vessels): PATRIC in 1997 at [facility] (unable to get report)       CABG (Date, Grafts): 1995 at [facility] (unable to get report)    Noninvasive Testing:   Stress Test: Date: 6/9/22       Protocol: Regadenoson Sestamibi Stress Test       Duration of Exercise: NA       Symptoms: None       EKG Changes: Stress induced PVCs        DTS: NA       Myocardial Imaging: Lg zone of decreased raiotracer uptake in the lateral segments.  EF 45%. Mildly reduced LV systolic function.         Risk Assessment (Low, Medium, High): Medium    Echo (Date, Findings):  5/16/22  Milsly reduced LV systolic function. EF 45%, Regional wall motion abnormality, LV filling pattern abnormal from impaired relaxation. Diastolic dysfunction is present (grade 3), mild valvular abnormalities. Dilated aortic root..     Antianginal Therapies: None       Beta Blockers:  n       Calcium Channel Blockers: n       Long Acting Nitrates: n       Ranexa: n    Associated Risk Factors:        Frailty Assessment: Mild       Cerebrovascular Disease: N/A       Chronic Lung Disease: N/A       Peripheral Arterial Disease: Yes       Chronic Kidney Disease (if yes, what is GFR): N/A       Uncontrolled Diabetes (if yes, what is HgbA1C or FBS): YES       Poorly Controlled Hypertension (if yes, what is SBP): N/A       Morbid Obesity (if yes, what is BMI): N/A       History of Recent Ventricular Arrhythmia: N/A       Inability to Ambulate Safely: N/A       Need for Therapeutic Anticoagulation: N/A       Antiplatelet or Contrast Allergy: N/A Narrative: This is a 89 yo male wit a PMHs of CAD sp CABG in 1995 at [facility] with Dr Shen, DM, Right sided CHF, PATRIC in 1997 at 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  He was treated for bronchitis in March and was seen by a pulmonologist. At that time his chest x-ray revealed atelectasis and he was started on steroids.  Secondary to c/o chest discomfort he was sent for an echocardiogram and a NST. His TTE revealed a dilated aorta, EF 45%, mitral sclerosis mild AI and mild MR and the Nuclear Stress Test revealed moderate lateral ischemia.      ASA 3  Mallampati 2  GFR 85  Creat 0.78  Bleeding Risk 1.2%  COVID19 - Negative    Symptoms:        Anigina (Class): 3       Ischemic Symptoms: Chest discomfort    Heart Failure:        Systolic/Diastolic/Combined: Combined        NYHA Class (within 2 weeks):     Assessment of LVEF (Must be within 6 months):       EF: 45%       Assessed by: TTE       Date: 5/16/22    Prior Cardiac Interventions (LHC, stents, CABG):       PCI's (Date, Stents, Vessels): PATRIC in 1997 at [facility] (unable to get report)       CABG (Date, Grafts): 1995 at [facility] (unable to get report)    Noninvasive Testing:   Stress Test: Date: 6/9/22       Protocol: Regadenoson Sestamibi Stress Test       Duration of Exercise: NA       Symptoms: None       EKG Changes: Stress induced PVCs        DTS: NA       Myocardial Imaging: Lg zone of decreased raiotracer uptake in the lateral segments.  EF 45%. Mildly reduced LV systolic function.         Risk Assessment (Low, Medium, High): Medium    Echo (Date, Findings):  5/16/22  Milsly reduced LV systolic function. EF 45%, Regional wall motion abnormality, LV filling pattern abnormal from impaired relaxation. Diastolic dysfunction is present (grade 3), mild valvular abnormalities. Dilated aortic root..     Antianginal Therapies: None       Beta Blockers:  n       Calcium Channel Blockers: n       Long Acting Nitrates: n       Ranexa: n    Associated Risk Factors:        Frailty Assessment: Mild       Cerebrovascular Disease: N/A       Chronic Lung Disease: N/A       Peripheral Arterial Disease: Yes       Chronic Kidney Disease (if yes, what is GFR): N/A       Uncontrolled Diabetes (if yes, what is HgbA1C or FBS): YES       Poorly Controlled Hypertension (if yes, what is SBP): N/A       Morbid Obesity (if yes, what is BMI): N/A       History of Recent Ventricular Arrhythmia: N/A       Inability to Ambulate Safely: N/A       Need for Therapeutic Anticoagulation: N/A       Antiplatelet or Contrast Allergy: N/A

## 2022-07-13 ENCOUNTER — TRANSCRIPTION ENCOUNTER (OUTPATIENT)
Age: 87
End: 2022-07-13

## 2022-07-13 VITALS
OXYGEN SATURATION: 96 % | SYSTOLIC BLOOD PRESSURE: 135 MMHG | RESPIRATION RATE: 17 BRPM | DIASTOLIC BLOOD PRESSURE: 65 MMHG | HEART RATE: 64 BPM

## 2022-07-13 LAB
ALBUMIN SERPL ELPH-MCNC: 4 G/DL — SIGNIFICANT CHANGE UP (ref 3.3–5.2)
ALP SERPL-CCNC: 75 U/L — SIGNIFICANT CHANGE UP (ref 40–120)
ALT FLD-CCNC: 22 U/L — SIGNIFICANT CHANGE UP
ANION GAP SERPL CALC-SCNC: 14 MMOL/L — SIGNIFICANT CHANGE UP (ref 5–17)
AST SERPL-CCNC: 16 U/L — SIGNIFICANT CHANGE UP
BILIRUB SERPL-MCNC: 0.7 MG/DL — SIGNIFICANT CHANGE UP (ref 0.4–2)
BUN SERPL-MCNC: 20.5 MG/DL — HIGH (ref 8–20)
CALCIUM SERPL-MCNC: 9.2 MG/DL — SIGNIFICANT CHANGE UP (ref 8.6–10.2)
CHLORIDE SERPL-SCNC: 98 MMOL/L — SIGNIFICANT CHANGE UP (ref 98–107)
CHOLEST SERPL-MCNC: 166 MG/DL — SIGNIFICANT CHANGE UP
CO2 SERPL-SCNC: 26 MMOL/L — SIGNIFICANT CHANGE UP (ref 22–29)
CREAT SERPL-MCNC: 0.95 MG/DL — SIGNIFICANT CHANGE UP (ref 0.5–1.3)
EGFR: 76 ML/MIN/1.73M2 — SIGNIFICANT CHANGE UP
GLUCOSE BLDC GLUCOMTR-MCNC: 122 MG/DL — HIGH (ref 70–99)
GLUCOSE SERPL-MCNC: 117 MG/DL — HIGH (ref 70–99)
HCT VFR BLD CALC: 41.3 % — SIGNIFICANT CHANGE UP (ref 39–50)
HDLC SERPL-MCNC: 50 MG/DL — SIGNIFICANT CHANGE UP
HGB BLD-MCNC: 14 G/DL — SIGNIFICANT CHANGE UP (ref 13–17)
LIPID PNL WITH DIRECT LDL SERPL: 97 MG/DL — SIGNIFICANT CHANGE UP
MAGNESIUM SERPL-MCNC: 2 MG/DL — SIGNIFICANT CHANGE UP (ref 1.6–2.6)
MCHC RBC-ENTMCNC: 30.8 PG — SIGNIFICANT CHANGE UP (ref 27–34)
MCHC RBC-ENTMCNC: 33.9 GM/DL — SIGNIFICANT CHANGE UP (ref 32–36)
MCV RBC AUTO: 90.8 FL — SIGNIFICANT CHANGE UP (ref 80–100)
NON HDL CHOLESTEROL: 116 MG/DL — SIGNIFICANT CHANGE UP
PLATELET # BLD AUTO: 217 K/UL — SIGNIFICANT CHANGE UP (ref 150–400)
POTASSIUM SERPL-MCNC: 3.9 MMOL/L — SIGNIFICANT CHANGE UP (ref 3.5–5.3)
POTASSIUM SERPL-SCNC: 3.9 MMOL/L — SIGNIFICANT CHANGE UP (ref 3.5–5.3)
PROT SERPL-MCNC: 7.1 G/DL — SIGNIFICANT CHANGE UP (ref 6.6–8.7)
RBC # BLD: 4.55 M/UL — SIGNIFICANT CHANGE UP (ref 4.2–5.8)
RBC # FLD: 13.2 % — SIGNIFICANT CHANGE UP (ref 10.3–14.5)
SODIUM SERPL-SCNC: 138 MMOL/L — SIGNIFICANT CHANGE UP (ref 135–145)
TRIGL SERPL-MCNC: 97 MG/DL — SIGNIFICANT CHANGE UP
WBC # BLD: 9 K/UL — SIGNIFICANT CHANGE UP (ref 3.8–10.5)
WBC # FLD AUTO: 9 K/UL — SIGNIFICANT CHANGE UP (ref 3.8–10.5)

## 2022-07-13 PROCEDURE — 85027 COMPLETE CBC AUTOMATED: CPT

## 2022-07-13 PROCEDURE — 80053 COMPREHEN METABOLIC PANEL: CPT

## 2022-07-13 PROCEDURE — 93005 ELECTROCARDIOGRAM TRACING: CPT

## 2022-07-13 PROCEDURE — 82962 GLUCOSE BLOOD TEST: CPT

## 2022-07-13 PROCEDURE — 36415 COLL VENOUS BLD VENIPUNCTURE: CPT

## 2022-07-13 PROCEDURE — 93010 ELECTROCARDIOGRAM REPORT: CPT | Mod: 76

## 2022-07-13 PROCEDURE — C1887: CPT

## 2022-07-13 PROCEDURE — C1874: CPT

## 2022-07-13 PROCEDURE — 85025 COMPLETE CBC W/AUTO DIFF WBC: CPT

## 2022-07-13 PROCEDURE — 86900 BLOOD TYPING SEROLOGIC ABO: CPT

## 2022-07-13 PROCEDURE — 93455 CORONARY ART/GRFT ANGIO S&I: CPT | Mod: XU

## 2022-07-13 PROCEDURE — C1769: CPT

## 2022-07-13 PROCEDURE — C1753: CPT

## 2022-07-13 PROCEDURE — C1760: CPT

## 2022-07-13 PROCEDURE — 86901 BLOOD TYPING SEROLOGIC RH(D): CPT

## 2022-07-13 PROCEDURE — 83036 HEMOGLOBIN GLYCOSYLATED A1C: CPT

## 2022-07-13 PROCEDURE — C9600: CPT | Mod: RC

## 2022-07-13 PROCEDURE — C1894: CPT

## 2022-07-13 PROCEDURE — 83735 ASSAY OF MAGNESIUM: CPT

## 2022-07-13 PROCEDURE — 80061 LIPID PANEL: CPT

## 2022-07-13 PROCEDURE — 86850 RBC ANTIBODY SCREEN: CPT

## 2022-07-13 PROCEDURE — 92978 ENDOLUMINL IVUS OCT C 1ST: CPT | Mod: RC

## 2022-07-13 PROCEDURE — C1725: CPT

## 2022-07-13 RX ORDER — CLOPIDOGREL BISULFATE 75 MG/1
1 TABLET, FILM COATED ORAL
Qty: 90 | Refills: 3
Start: 2022-07-13 | End: 2023-07-07

## 2022-07-13 RX ORDER — LANOLIN ALCOHOL/MO/W.PET/CERES
5 CREAM (GRAM) TOPICAL ONCE
Refills: 0 | Status: COMPLETED | OUTPATIENT
Start: 2022-07-13 | End: 2022-07-13

## 2022-07-13 RX ORDER — ACETAMINOPHEN 500 MG
650 TABLET ORAL ONCE
Refills: 0 | Status: COMPLETED | OUTPATIENT
Start: 2022-07-13 | End: 2022-07-13

## 2022-07-13 RX ORDER — POTASSIUM CHLORIDE 20 MEQ
20 PACKET (EA) ORAL ONCE
Refills: 0 | Status: COMPLETED | OUTPATIENT
Start: 2022-07-13 | End: 2022-07-13

## 2022-07-13 RX ORDER — MAGNESIUM SULFATE 500 MG/ML
1 VIAL (ML) INJECTION ONCE
Refills: 0 | Status: COMPLETED | OUTPATIENT
Start: 2022-07-13 | End: 2022-07-13

## 2022-07-13 RX ORDER — ONDANSETRON 8 MG/1
4 TABLET, FILM COATED ORAL ONCE
Refills: 0 | Status: COMPLETED | OUTPATIENT
Start: 2022-07-13 | End: 2022-07-13

## 2022-07-13 RX ORDER — ATORVASTATIN CALCIUM 80 MG/1
1 TABLET, FILM COATED ORAL
Qty: 90 | Refills: 3
Start: 2022-07-13 | End: 2023-07-07

## 2022-07-13 RX ADMIN — Medication 20 MILLIEQUIVALENT(S): at 08:31

## 2022-07-13 RX ADMIN — Medication 100 GRAM(S): at 06:37

## 2022-07-13 RX ADMIN — CLOPIDOGREL BISULFATE 75 MILLIGRAM(S): 75 TABLET, FILM COATED ORAL at 08:06

## 2022-07-13 RX ADMIN — ONDANSETRON 4 MILLIGRAM(S): 8 TABLET, FILM COATED ORAL at 03:18

## 2022-07-13 RX ADMIN — Medication 20 MILLIEQUIVALENT(S): at 05:52

## 2022-07-13 RX ADMIN — Medication 5 MILLIGRAM(S): at 02:28

## 2022-07-13 RX ADMIN — Medication 81 MILLIGRAM(S): at 08:06

## 2022-07-13 RX ADMIN — Medication 650 MILLIGRAM(S): at 02:28

## 2022-07-13 RX ADMIN — Medication 650 MILLIGRAM(S): at 00:50

## 2022-07-13 NOTE — DISCHARGE NOTE NURSING/CASE MANAGEMENT/SOCIAL WORK - NSDCPEFALRISK_GEN_ALL_CORE
For information on Fall & Injury Prevention, visit: [email or web address] OR  [email or web address] OR  https://www.cdc.gov/steadi/patient.html

## 2022-07-13 NOTE — CHART NOTE - NSCHARTNOTEFT_GEN_A_CORE
Pt complained of acid reflux and nausea around 2am. Mylanta 30 mL and Zofran 4 mg IV given with improvement of symptoms. EKG obtained, TWI again noted in lateral leads but ST depressions resolved from previous, pt denies chest pain or SOB.   Pt noted to have 18 beats of Vtach on telemetry around 3:15am, asymptomatic, BP stable. Labs sent and electrolytes replaced. Pt awake and comfortable at this time, no ectopy on telemetry.

## 2022-08-26 PROBLEM — M19.90 UNSPECIFIED OSTEOARTHRITIS, UNSPECIFIED SITE: Chronic | Status: ACTIVE | Noted: 2022-07-12

## 2022-08-26 PROBLEM — I10 ESSENTIAL (PRIMARY) HYPERTENSION: Chronic | Status: ACTIVE | Noted: 2022-07-12

## 2022-08-26 PROBLEM — K21.9 GASTRO-ESOPHAGEAL REFLUX DISEASE WITHOUT ESOPHAGITIS: Chronic | Status: ACTIVE | Noted: 2022-07-12

## 2022-08-29 RX ORDER — CHLORHEXIDINE GLUCONATE 213 G/1000ML
1 SOLUTION TOPICAL ONCE
Refills: 0 | Status: DISCONTINUED | OUTPATIENT
Start: 2022-08-30 | End: 2022-08-31

## 2022-08-29 NOTE — H&P PST ADULT - ASSESSMENT
Impression:      Plan:  -plan for LHC via RA vs FA  -patient seen and examined  -confirmed appropriate NPO duration  -ECG and Labs reviewed  -Aspirin 81mg and Plavix 75mg po pre-cath  -NS 250mL bolus pre-cath****  -procedure discussed with patient; risks and benefits explained, questions answered  -consent obtained by attending IC       Impression:  89yo male with know CAD with recent PATRIC X 3 to RCA 7/2022, now presents for stage PCI to mLAD via LIMA graft to be performed by Dr Rodarte.     Plan:  -plan for PCI via  FA  -patient seen and examined  -confirmed appropriate NPO duration  -ECG and Labs reviewed  -Aspirin 81mg and Plavix 75mg po pre-cath  -No pre-cath IVH due to heart failure status  -procedure discussed with patient; risks and benefits explained, questions answered  -consent obtained by attending IC

## 2022-08-29 NOTE — H&P PST ADULT - HISTORY OF PRESENT ILLNESS
Department of Cardiology                                                                  [facility]/[place] [address] [address]                                                            Telephone: [phone]. Fax:[phone]                                                                             Pre- Procedure H + P/Progress Note      HPI:  89yo male with h/o HTN, HLD, obesity, DM, CAD with prior CABG 1995, LIMA to LAD is the only patent graft, subsequent PCIs, PAD/carotid disease, BiV heart failure, COVID 3/2022, OA, recently endorsing chest discomfort and NST with large zone lateral ischemia and LVEF 45%, underwent LHC 7/12/22 with patent LIMA, severe native CAD s/p PATRIC X 3 to RCA, now presents for stage PCI to lesion mLAD distal to LIMA anastomosis, procedure to be performed by Dr Rodarte.        Symptoms:        Angina (Class):        Ischemic Symptoms:     Heart Failure: BiV heart failure       Systolic/Diastolic/Combined: systolic       NYHA Class (within 2 weeks):       Assessment of LVEF (Must be within 6 months):       EF: 45%       Assessed by: TTE       Date: 5/16/22      Echo 5/16/22:  Mildly reduced LV systolic function. EF 45%, Regional wall motion abnormality, LV filling pattern abnormal from impaired relaxation. Diastolic dysfunction is present (grade 3), mild valvular abnormalities. Dilated aortic root..       LHC/PCI 7/12/22:  -Severe native 3 vessel disease.   -Only LIMA to LAD patent.   -80% narrowing of the native mid LAD distal to the LIMA anastomosis.   Severe in stent restenosis of the native RCA; successfully restented  with larger caliber stents  PCI to RCA ISR:  1. A successful Drug Eluting Stent was deployed using a OLIVIA FRONTIER  3.50 X 38MM During Procedure.      2. A successful Drug Eluting Stent was deployed using a OLIVIA FRONTIER 3.0  X 12MM During Procedure.      3. A successful Drug Eluting Stent was deployed using a OLIVIA FRONTIER 4.0  X 38MM During Procedure.        Risk Assessments:  ASA:  Mallampati:  Bleeding Risk:  Creatinine:  GFR:    Associated Risk Factors:        Frailty Assessment: (none/mild/mod/severe)       Cerebrovascular Disease: N/A       Chronic Lung Disease: N/A       Peripheral Arterial Disease: N/A       Chronic Kidney Disease (if yes, what is GFR): N/A       Uncontrolled Diabetes (if yes, what is HgbA1C or FBS): N/A       Poorly Controlled Hypertension (if yes, what is SBP): N/A       Morbid Obesity (if yes, what is BMI): N/A       History of Recent Ventricular Arrhythmia: N/A       Inability to Ambulate Safely: N/A       Need for Therapeutic Anticoagulation: N/A       Antiplatelet or Contrast Allergy: N/A    Antianginal Therapies:        Beta Blockers:         Calcium Channel Blockers:        Long Acting Nitrates:        Ranexa:                                                                            Department of Cardiology                                                                  [facility]/[place] [address] [address]                                                            Telephone: [phone]. Fax:[phone]                                                                             Pre- Procedure H + P/Progress Note      HPI:  89yo male with h/o HTN, HLD, obesity, DM, CAD with prior CABG 1995, LIMA to LAD is the only patent graft, subsequent PCIs, PAD/carotid disease, BiV heart failure, COVID 3/2022, OA, recently endorsing chest discomfort and NST with large zone lateral ischemia and LVEF 45%, underwent LHC 7/12/22 with patent LIMA, severe native CAD s/p PATRIC X 3 to RCA, now presents for stage PCI to lesion mLAD distal to LIMA anastomosis, procedure to be performed by Dr Rodarte.        Symptoms:        Angina (Class): II       Ischemic Symptoms: CP    Heart Failure: BiV heart failure       Systolic/Diastolic/Combined: systolic       NYHA Class (within 2 weeks): II      Assessment of LVEF (Must be within 6 months):       EF: 45%       Assessed by: TTE       Date: 5/16/22      Echo 5/16/22:  Mildly reduced LV systolic function. EF 45%, Regional wall motion abnormality, LV filling pattern abnormal from impaired relaxation. Diastolic dysfunction is present (grade 3), mild valvular abnormalities. Dilated aortic root..       LHC/PCI 7/12/22:  -Severe native 3 vessel disease.   -Only LIMA to LAD patent.   -80% narrowing of the native mid LAD distal to the LIMA anastomosis.   Severe in stent restenosis of the native RCA; successfully restented  with larger caliber stents  PCI to RCA ISR:  1. A successful Drug Eluting Stent was deployed using a OLIVIA FRONTIER  3.50 X 38MM During Procedure.      2. A successful Drug Eluting Stent was deployed using a OLIVIA FRONTIER 3.0  X 12MM During Procedure.      3. A successful Drug Eluting Stent was deployed using a OLIVIA FRONTIER 4.0  X 38MM During Procedure.        Risk Assessments:  ASA: 3  Mallampati: 2  Bleeding Risk: 1.1%  Creatinine: 0.85  GFR: 83    Associated Risk Factors:        Frailty Assessment: (none/mild/mod/severe): mod       Cerebrovascular Disease: N/A       Chronic Lung Disease: N/A       Peripheral Arterial Disease: N/A       Chronic Kidney Disease (if yes, what is GFR): N/A       Uncontrolled Diabetes (if yes, what is HgbA1C or FBS): N/A       Poorly Controlled Hypertension (if yes, what is SBP): N/A       Morbid Obesity (if yes, what is BMI): N/A       History of Recent Ventricular Arrhythmia: N/A       Inability to Ambulate Safely: N/A       Need for Therapeutic Anticoagulation: N/A       Antiplatelet or Contrast Allergy: N/A    Antianginal Therapies:        Beta Blockers:         Calcium Channel Blockers:        Long Acting Nitrates:        Ranexa:     ROS: as stated above, otherwise negative    PHYSICAL EXAM:  Constitutional: A & O x 3, NAD  HEENT:  Normal oral mucosa, PERRL, EOMI	  Cardiovascular: S1 S2, No murmurs, No JVD  Respiratory: Lungs clear to auscultation	  Gastrointestinal:  Soft, Non-tender, + BS	  Skin: No rashes or cyanosis  Neurologic: No deficit appreciated  Extremities: Normal range of motion, 2+ LE edema  Vascular: distal pulses +

## 2022-08-30 ENCOUNTER — INPATIENT (INPATIENT)
Facility: HOSPITAL | Age: 87
LOS: 0 days | Discharge: HOME | DRG: 247 | End: 2022-08-31
Attending: INTERNAL MEDICINE | Admitting: INTERNAL MEDICINE
Payer: COMMERCIAL

## 2022-08-30 ENCOUNTER — TRANSCRIPTION ENCOUNTER (OUTPATIENT)
Age: 87
End: 2022-08-30

## 2022-08-30 VITALS
HEART RATE: 54 BPM | OXYGEN SATURATION: 98 % | RESPIRATION RATE: 16 BRPM | SYSTOLIC BLOOD PRESSURE: 153 MMHG | DIASTOLIC BLOOD PRESSURE: 66 MMHG | TEMPERATURE: 98 F | HEIGHT: 66 IN | WEIGHT: 139.99 LBS

## 2022-08-30 DIAGNOSIS — E11.9 TYPE 2 DIABETES MELLITUS WITHOUT COMPLICATIONS: ICD-10-CM

## 2022-08-30 DIAGNOSIS — R06.9 UNSPECIFIED ABNORMALITIES OF BREATHING: ICD-10-CM

## 2022-08-30 DIAGNOSIS — Z95.1 PRESENCE OF AORTOCORONARY BYPASS GRAFT: Chronic | ICD-10-CM

## 2022-08-30 DIAGNOSIS — E78.5 HYPERLIPIDEMIA, UNSPECIFIED: ICD-10-CM

## 2022-08-30 DIAGNOSIS — Z95.5 PRESENCE OF CORONARY ANGIOPLASTY IMPLANT AND GRAFT: Chronic | ICD-10-CM

## 2022-08-30 LAB
ANION GAP SERPL CALC-SCNC: 9 MMOL/L — SIGNIFICANT CHANGE UP (ref 5–17)
BASOPHILS # BLD AUTO: 0.03 K/UL — SIGNIFICANT CHANGE UP (ref 0–0.2)
BASOPHILS NFR BLD AUTO: 0.4 % — SIGNIFICANT CHANGE UP (ref 0–2)
BLD GP AB SCN SERPL QL: SIGNIFICANT CHANGE UP
BUN SERPL-MCNC: 20.6 MG/DL — HIGH (ref 8–20)
CALCIUM SERPL-MCNC: 9.5 MG/DL — SIGNIFICANT CHANGE UP (ref 8.4–10.5)
CHLORIDE SERPL-SCNC: 100 MMOL/L — SIGNIFICANT CHANGE UP (ref 98–107)
CO2 SERPL-SCNC: 27 MMOL/L — SIGNIFICANT CHANGE UP (ref 22–29)
CREAT SERPL-MCNC: 0.85 MG/DL — SIGNIFICANT CHANGE UP (ref 0.5–1.3)
EGFR: 83 ML/MIN/1.73M2 — SIGNIFICANT CHANGE UP
EOSINOPHIL # BLD AUTO: 0.13 K/UL — SIGNIFICANT CHANGE UP (ref 0–0.5)
EOSINOPHIL NFR BLD AUTO: 1.8 % — SIGNIFICANT CHANGE UP (ref 0–6)
GLUCOSE BLDC GLUCOMTR-MCNC: 125 MG/DL — HIGH (ref 70–99)
GLUCOSE BLDC GLUCOMTR-MCNC: 157 MG/DL — HIGH (ref 70–99)
GLUCOSE BLDC GLUCOMTR-MCNC: <30 MG/DL — CRITICAL LOW (ref 70–99)
GLUCOSE SERPL-MCNC: 120 MG/DL — HIGH (ref 70–99)
HCT VFR BLD CALC: 41.5 % — SIGNIFICANT CHANGE UP (ref 39–50)
HGB BLD-MCNC: 13.7 G/DL — SIGNIFICANT CHANGE UP (ref 13–17)
IMM GRANULOCYTES NFR BLD AUTO: 0.1 % — SIGNIFICANT CHANGE UP (ref 0–1.5)
LYMPHOCYTES # BLD AUTO: 2.72 K/UL — SIGNIFICANT CHANGE UP (ref 1–3.3)
LYMPHOCYTES # BLD AUTO: 37.2 % — SIGNIFICANT CHANGE UP (ref 13–44)
MAGNESIUM SERPL-MCNC: 1.9 MG/DL — SIGNIFICANT CHANGE UP (ref 1.6–2.6)
MCHC RBC-ENTMCNC: 31.4 PG — SIGNIFICANT CHANGE UP (ref 27–34)
MCHC RBC-ENTMCNC: 33 GM/DL — SIGNIFICANT CHANGE UP (ref 32–36)
MCV RBC AUTO: 95 FL — SIGNIFICANT CHANGE UP (ref 80–100)
MONOCYTES # BLD AUTO: 0.58 K/UL — SIGNIFICANT CHANGE UP (ref 0–0.9)
MONOCYTES NFR BLD AUTO: 7.9 % — SIGNIFICANT CHANGE UP (ref 2–14)
NEUTROPHILS # BLD AUTO: 3.85 K/UL — SIGNIFICANT CHANGE UP (ref 1.8–7.4)
NEUTROPHILS NFR BLD AUTO: 52.6 % — SIGNIFICANT CHANGE UP (ref 43–77)
PLATELET # BLD AUTO: 191 K/UL — SIGNIFICANT CHANGE UP (ref 150–400)
POTASSIUM SERPL-MCNC: 4 MMOL/L — SIGNIFICANT CHANGE UP (ref 3.5–5.3)
POTASSIUM SERPL-SCNC: 4 MMOL/L — SIGNIFICANT CHANGE UP (ref 3.5–5.3)
RBC # BLD: 4.37 M/UL — SIGNIFICANT CHANGE UP (ref 4.2–5.8)
RBC # FLD: 13.7 % — SIGNIFICANT CHANGE UP (ref 10.3–14.5)
SODIUM SERPL-SCNC: 136 MMOL/L — SIGNIFICANT CHANGE UP (ref 135–145)
WBC # BLD: 7.32 K/UL — SIGNIFICANT CHANGE UP (ref 3.8–10.5)
WBC # FLD AUTO: 7.32 K/UL — SIGNIFICANT CHANGE UP (ref 3.8–10.5)

## 2022-08-30 RX ORDER — FUROSEMIDE 40 MG
40 TABLET ORAL DAILY
Refills: 0 | Status: DISCONTINUED | OUTPATIENT
Start: 2022-08-31 | End: 2022-08-31

## 2022-08-30 RX ORDER — SODIUM CHLORIDE 9 MG/ML
1000 INJECTION, SOLUTION INTRAVENOUS
Refills: 0 | Status: DISCONTINUED | OUTPATIENT
Start: 2022-08-30 | End: 2022-08-31

## 2022-08-30 RX ORDER — CEFPODOXIME PROXETIL 100 MG
100 TABLET ORAL EVERY 12 HOURS
Refills: 0 | Status: DISCONTINUED | OUTPATIENT
Start: 2022-08-30 | End: 2022-08-31

## 2022-08-30 RX ORDER — CLOPIDOGREL BISULFATE 75 MG/1
75 TABLET, FILM COATED ORAL ONCE
Refills: 0 | Status: COMPLETED | OUTPATIENT
Start: 2022-08-30 | End: 2022-08-30

## 2022-08-30 RX ORDER — ASPIRIN/CALCIUM CARB/MAGNESIUM 324 MG
81 TABLET ORAL ONCE
Refills: 0 | Status: COMPLETED | OUTPATIENT
Start: 2022-08-30 | End: 2022-08-30

## 2022-08-30 RX ORDER — INSULIN LISPRO 100/ML
VIAL (ML) SUBCUTANEOUS AT BEDTIME
Refills: 0 | Status: DISCONTINUED | OUTPATIENT
Start: 2022-08-30 | End: 2022-08-31

## 2022-08-30 RX ORDER — TIMOLOL 0.5 %
1 DROPS OPHTHALMIC (EYE) DAILY
Refills: 0 | Status: DISCONTINUED | OUTPATIENT
Start: 2022-08-31 | End: 2022-08-31

## 2022-08-30 RX ORDER — DEXTROSE 50 % IN WATER 50 %
25 SYRINGE (ML) INTRAVENOUS ONCE
Refills: 0 | Status: DISCONTINUED | OUTPATIENT
Start: 2022-08-30 | End: 2022-08-31

## 2022-08-30 RX ORDER — LANOLIN ALCOHOL/MO/W.PET/CERES
5 CREAM (GRAM) TOPICAL AT BEDTIME
Refills: 0 | Status: DISCONTINUED | OUTPATIENT
Start: 2022-08-30 | End: 2022-08-31

## 2022-08-30 RX ORDER — BRIMONIDINE TARTRATE 2 MG/MG
1 SOLUTION/ DROPS OPHTHALMIC
Refills: 0 | Status: DISCONTINUED | OUTPATIENT
Start: 2022-08-30 | End: 2022-08-31

## 2022-08-30 RX ORDER — LOSARTAN POTASSIUM 100 MG/1
50 TABLET, FILM COATED ORAL DAILY
Refills: 0 | Status: DISCONTINUED | OUTPATIENT
Start: 2022-08-31 | End: 2022-08-31

## 2022-08-30 RX ORDER — INSULIN LISPRO 100/ML
VIAL (ML) SUBCUTANEOUS
Refills: 0 | Status: DISCONTINUED | OUTPATIENT
Start: 2022-08-30 | End: 2022-08-31

## 2022-08-30 RX ORDER — ACETAMINOPHEN 500 MG
650 TABLET ORAL EVERY 6 HOURS
Refills: 0 | Status: DISCONTINUED | OUTPATIENT
Start: 2022-08-30 | End: 2022-08-31

## 2022-08-30 RX ORDER — ASPIRIN/CALCIUM CARB/MAGNESIUM 324 MG
81 TABLET ORAL DAILY
Refills: 0 | Status: DISCONTINUED | OUTPATIENT
Start: 2022-08-31 | End: 2022-08-31

## 2022-08-30 RX ORDER — CLOPIDOGREL BISULFATE 75 MG/1
75 TABLET, FILM COATED ORAL DAILY
Refills: 0 | Status: DISCONTINUED | OUTPATIENT
Start: 2022-08-31 | End: 2022-08-31

## 2022-08-30 RX ORDER — HYDRALAZINE HCL 50 MG
5 TABLET ORAL ONCE
Refills: 0 | Status: DISCONTINUED | OUTPATIENT
Start: 2022-08-30 | End: 2022-08-31

## 2022-08-30 RX ORDER — HYDRALAZINE HCL 50 MG
25 TABLET ORAL ONCE
Refills: 0 | Status: COMPLETED | OUTPATIENT
Start: 2022-08-30 | End: 2022-08-30

## 2022-08-30 RX ORDER — GLUCAGON INJECTION, SOLUTION 0.5 MG/.1ML
1 INJECTION, SOLUTION SUBCUTANEOUS ONCE
Refills: 0 | Status: DISCONTINUED | OUTPATIENT
Start: 2022-08-30 | End: 2022-08-31

## 2022-08-30 RX ORDER — OXYBUTYNIN CHLORIDE 5 MG
5 TABLET ORAL DAILY
Refills: 0 | Status: DISCONTINUED | OUTPATIENT
Start: 2022-08-30 | End: 2022-08-31

## 2022-08-30 RX ORDER — DEXTROSE 50 % IN WATER 50 %
12.5 SYRINGE (ML) INTRAVENOUS ONCE
Refills: 0 | Status: DISCONTINUED | OUTPATIENT
Start: 2022-08-30 | End: 2022-08-31

## 2022-08-30 RX ORDER — DEXTROSE 50 % IN WATER 50 %
15 SYRINGE (ML) INTRAVENOUS ONCE
Refills: 0 | Status: DISCONTINUED | OUTPATIENT
Start: 2022-08-30 | End: 2022-08-31

## 2022-08-30 RX ORDER — ATORVASTATIN CALCIUM 80 MG/1
40 TABLET, FILM COATED ORAL AT BEDTIME
Refills: 0 | Status: DISCONTINUED | OUTPATIENT
Start: 2022-08-30 | End: 2022-08-31

## 2022-08-30 RX ADMIN — Medication 650 MILLIGRAM(S): at 23:00

## 2022-08-30 RX ADMIN — Medication 81 MILLIGRAM(S): at 13:28

## 2022-08-30 RX ADMIN — Medication 5 MILLIGRAM(S): at 21:58

## 2022-08-30 RX ADMIN — Medication 25 MILLIGRAM(S): at 17:51

## 2022-08-30 RX ADMIN — CLOPIDOGREL BISULFATE 75 MILLIGRAM(S): 75 TABLET, FILM COATED ORAL at 13:28

## 2022-08-30 RX ADMIN — Medication 2: at 17:51

## 2022-08-30 RX ADMIN — Medication 100 MILLIGRAM(S): at 18:06

## 2022-08-30 RX ADMIN — ATORVASTATIN CALCIUM 40 MILLIGRAM(S): 80 TABLET, FILM COATED ORAL at 21:58

## 2022-08-30 RX ADMIN — BRIMONIDINE TARTRATE 1 DROP(S): 2 SOLUTION/ DROPS OPHTHALMIC at 18:07

## 2022-08-30 RX ADMIN — Medication 650 MILLIGRAM(S): at 21:58

## 2022-08-30 NOTE — DISCHARGE NOTE PROVIDER - NSDCMRMEDTOKEN_GEN_ALL_CORE_FT
aluminum hydroxide-magnesium hydroxide 200 mg-200 mg/5 mL oral suspension: 30 milliliter(s) orally every 4 hours, As needed, Dyspepsia  aspirin 81 mg oral tablet: 1 tab(s) orally once a day  atorvastatin:   brimonidine 0.2% ophthalmic solution: 1 drop(s) to each affected eye 2 times a day  cefpodoxime 100 mg oral tablet: 1 tab(s) orally every 12 hours  CoQ10: orally once a day  furosemide 40 mg oral tablet: 1 tab(s) orally once a day  losartan 50 mg oral tablet: 1 tab(s) orally once a day  metFORMIN 500 mg oral tablet: 1 tab(s) orally 2 times a day  Hold X 2 days post procedure. Restart on 7/15/22  Plavix 75 mg oral tablet: 1 tab(s) orally once a day  Timolol Maleate, long-acting 0.5% ophthalmic solution: 1 drop(s) to each affected eye once a day (in the morning)   aluminum hydroxide-magnesium hydroxide 200 mg-200 mg/5 mL oral suspension: 30 milliliter(s) orally every 4 hours, As needed, Dyspepsia  aspirin 81 mg oral tablet: 1 tab(s) orally once a day  atorvastatin: 1 tab(s) orally once a day (in the evening)  brimonidine 0.2% ophthalmic solution: 1 drop(s) to each affected eye 2 times a day  cefpodoxime 100 mg oral tablet: 1 tab(s) orally every 12 hours  CoQ10: orally once a day  furosemide 40 mg oral tablet: 1 tab(s) orally once a day  losartan 50 mg oral tablet: 1 tab(s) orally once a day  metFORMIN 500 mg oral tablet: 1 tab(s) orally 2 times a day  Hold X 2 days post procedure. Restart on 9/2/22  oxybutynin 5 mg oral tablet: 1 tab(s) orally once a day  Plavix 75 mg oral tablet: 1 tab(s) orally once a day  Timolol Maleate, long-acting 0.5% ophthalmic solution: 1 drop(s) to each affected eye once a day (in the morning)

## 2022-08-30 NOTE — DISCHARGE NOTE PROVIDER - NSDCCPTREATMENT_GEN_ALL_CORE_FT
PRINCIPAL PROCEDURE  Procedure: Percutaneous coronary intervention (PCI) with insertion of stent into left anterior descending (LAD) coronary artery  Findings and Treatment: A drug eluting stent was placed to your left anterior descending coronary artery. Go to the ED with any acute onset of chest pain, palpitations, shortness of breath or dizziness. Do NOT miss a dose or stop taking your Aspirin and Plavix, these medications keep your stent open and prevent a heart attack. If anyone tells you to stop these medications, speak to your cardiologist immediately.  Managing risk factors will help keep your stent open and prevent future blockages, risk factors may include: high blood pressure, high cholesterol, diabetes, obesity, sedentary life style and smoking.    Your diet should be low in fat, cholesterol, salt and carbohydrates, increase fruits (caution if diabetic), vegetables and whole grains/fiber rich foods.   Take all your cardiac  medications as prescribed.    No heavy lifting, excessine bending or range of motion on affected limb 5-7days.  No submerging the site in water (pool/bath) 5-7days. You may start showering the day after your procedure. Remove the dressing, cleanse the area with plain soap and water and then pat dry. You may place a bandaid for 1-2 days, NO cream,/lotion/medicines on the site.   Bruising and a small nodule at the site is normal. Call your doctor for any bleeding, swelling/hardness, increasing pain or redness, loss of sensation in the hand/leg or discoloration.   If heavy bleeding or large lumps form, hold pressure at the spot and come to the Emergency Room.  Exercise is a very important factor in heart health. Once your post procedure restrictions have passed, you should engage in heart healthy, aerobic exercise. Be sure to have clearance from your cardiologist. Cardiac rehab programs could be extremely beneficial and your cardiologist could help set this up.   Follow up with your cardiologist within 1-2 weeks after your procedure.   Call your cardiologist or our unit ([phone]) with any questions or concerns that may arise.

## 2022-08-30 NOTE — PROGRESS NOTE ADULT - SUBJECTIVE AND OBJECTIVE BOX
Department of Cardiology                                                                  [facility]/[place] [address] [address] [facility]                                                            Telephone: [phone]. Fax:[phone]                                                    Post- Procedure Note: Left Heart Cardiac Catheterization       Narrative:  89y  Male now s/p PATRIC to mLAD via LIMA graft via LFA, procedure performed by Dr. Rodarte, received heparin for A/C, Fentanyl and Versed IV intraprocedurally, arrived to recovery in NAD and HDS, Post PCI ECG with no acute changes, LFA access site stable with sheath in place for removal when ACT <180, no bleed/hematoma, distal pulse +, overnight admission given advanced age and lesion complexity.          PAST MEDICAL & SURGICAL HISTORY:  High cholesterol  Diabetes  MI, old  HTN (hypertension)  GERD (gastroesophageal reflux disease)  Arthritis  S/P CABG (coronary artery bypass graft)  History of coronary artery stent placement        Home Medications:  aluminum hydroxide-magnesium hydroxide 200 mg-200 mg/5 mL oral suspension: 30 milliliter(s) orally every 4 hours, As needed, Dyspepsia (30 Aug 2022 13:19)  aspirin 81 mg oral tablet: 1 tab(s) orally once a day (30 Aug 2022 13:19)  atorvastatin: 40mg po qhs (30 Aug 2022 13:19)  brimonidine 0.2% ophthalmic solution: 1 drop(s) to each affected eye 2 times a day (30 Aug 2022 13:19)  cefpodoxime 100 mg oral tablet: 1 tab(s) orally every 12 hours (30 Aug 2022 13:19)  CoQ10: orally once a day (30 Aug 2022 13:19)  furosemide 40 mg oral tablet: 1 tab(s) orally once a day (30 Aug 2022 13:19)  losartan 50 mg oral tablet: 1 tab(s) orally once a day (30 Aug 2022 13:19)  metFORMIN 500 mg oral tablet: 1 tab(s) orally 2 times a day  Hold X 2 days post procedure. Restart on 7/15/22 (30 Aug 2022 13:19)  Timolol Maleate, long-acting 0.5% ophthalmic solution: 1 drop(s) to each affected eye once a day (in the morning) (30 Aug 2022 13:19)    No Known Allergies      Objective:  Vital Signs Last 24 Hrs  T(C): 36.4 (30 Aug 2022 12:32), Max: 36.4 (30 Aug 2022 12:32)  T(F): 97.6 (30 Aug 2022 12:32), Max: 97.6 (30 Aug 2022 12:32)  HR: 54 (30 Aug 2022 12:32) (54 - 54)  BP: 153/66 (30 Aug 2022 12:32) (153/66 - 153/66)  BP(mean): --  RR: 16 (30 Aug 2022 12:32) (16 - 16)  SpO2: 98% (30 Aug 2022 12:32) (98% - 98%)    Parameters below as of 30 Aug 2022 12:32  Patient On (Oxygen Delivery Method): room air          PHYSICAL EXAM:  Constitutional: A & O x 3, NAD  HEENT:  Normal oral mucosa, PERRL, EOMI	  Cardiovascular: S1 S2, No murmurs, No JVD  Respiratory: Lungs clear to auscultation	  Gastrointestinal:  Soft, Non-tender, + BS	  Skin: No rashes or cyanosis  Neurologic: No deficit appreciated  Extremities: Normal range of motion, no edema  Vascular: Access site stable, no bleed or hematoma, distal pulses +     Labs:                           13.7   7.32  )-----------( 191      ( 30 Aug 2022 11:57 )             41.5     08-30    136  |  100  |  20.6<H>  ----------------------------<  120<H>  4.0   |  27.0  |  0.85    Ca    9.5      30 Aug 2022 11:57  Mg     1.9     08-30            Assessment:  89yo male with h/o HTN, HLD, obesity, DM, CAD with prior CABG 1995, LIMA to LAD is the only patent graft, subsequent PCIs, PAD/carotid disease, BiV heart failure, COVID 3/2022, OA, recently endorsing chest discomfort and NST with large zone lateral ischemia and LVEF 45%, underwent C 7/12/22 with patent LIMA, severe native CAD s/p PATRIC X 3 to RCA, now presents for stage PCI to lesion mLAD distal to LIMA anastomosis, procedure to be performed by Dr Rodarte.      Now s/p PATRIC to mLAD via LIMA graft via LFA, procedure performed by Dr. Rodarte, received heparin for A/C, Fentanyl and Versed IV intraprocedurally, arrived to recovery in NAD and HDS, Post PCI ECG with no acute changes, LFA access site stable with sheath in place for removal when ACT <180, no bleed/hematoma, distal pulse +, overnight admission given advanced age and lesion complexity.            Plan:  -Formal cath report pending  -Post procedure management/monitoring per protocol  -Access site precautions  -LFA sheath removal when ACT <180  -Bedrest x 4hours post sheath removal and achievement of hemostasis  -Labs and EKG in am  -Repeat ECG if any clinical indication or change on tele  -No IVH given heart failure status   -Continue current medical therapy  -Dual anti platelet therapy with aspirin/plavix   -Cont ARB with losartan 50mg po daily  -Restart lasix 40mg po daily on 8/31/22  -Cont statin therapy with Lipitor 40mg po qHS   -Educated regarding strict adherence with DAPT   -Educated regarding post procedure management and care  -Discussed the importance of RF modification  -Cardiac rehab info provided/referral and communication to cardiac rehab completed  -F/U outpt in 1-2 weeks with Cardiologist Dr. Rodarte  -DISPO: Plan for D/C in am if remains HDS, ECG and labs in am stable and without complications

## 2022-08-30 NOTE — DISCHARGE NOTE PROVIDER - NSDCFUADDINST_GEN_ALL_CORE_FT
Go to the ED with any acute onset of chest pain, palpitations, shortness of breath or dizziness. Do NOT miss a dose or stop taking your Aspirin and Plavix, these medications keep your stent open and prevent a heart attack. If anyone tells you to stop these medications, speak to your cardiologist immediately.  Managing risk factors will help keep your stent open and prevent future blockages, risk factors may include: high blood pressure, high cholesterol, diabetes, obesity, sedentary life style and smoking.    Your diet should be low in fat, cholesterol, salt and carbohydrates, increase fruits (caution if diabetic), vegetables and whole grains/fiber rich foods.   Take all your cardiac  medications as prescribed.    No heavy lifting, excessine bending or range of motion on affected limb 5-7days.  No submerging the site in water (pool/bath) 5-7days. You may start showering the day after your procedure. Remove the dressing, cleanse the area with plain soap and water and then pat dry. You may place a bandaid for 1-2 days, NO cream,/lotion/medicines on the site.   Bruising and a small nodule at the site is normal. Call your doctor for any bleeding, swelling/hardness, increasing pain or redness, loss of sensation in the hand/leg or discoloration.   If heavy bleeding or large lumps form, hold pressure at the spot and come to the Emergency Room.  Exercise is a very important factor in heart health. Once your post procedure restrictions have passed, you should engage in heart healthy, aerobic exercise. Be sure to have clearance from your cardiologist. Cardiac rehab programs could be extremely beneficial and your cardiologist could help set this up.   Follow up with your cardiologist within 1-2 weeks after your procedure.   Call your cardiologist or our unit ([phone]) with any questions or concerns that may arise.

## 2022-08-30 NOTE — DISCHARGE NOTE PROVIDER - NSDCCPCAREPLAN_GEN_ALL_CORE_FT
PRINCIPAL DISCHARGE DIAGNOSIS  Diagnosis: CAD (coronary artery disease)  Assessment and Plan of Treatment: Coronary artery disease is the buildup of plaque in the arteries that supply oxygen-rich blood to your heart. Plaque causes a narrowing or blockage that could result in a heart attack. Symptoms include chest pain or discomfort, shortness of breath, dizziness, palpitations, fatigue or reduced exercise tolerance. .  Go to the ED with any acute onset of chest pain, palpitations, shortness of breath or dizziness. Do NOT miss a dose or stop taking your Aspirin and Plavix, these medications keep your stent open and prevent a heart attack. If anyone tells you to stop these medications, speak to your cardiologist immediately.  Managing risk factors will help keep your stent open and prevent future blockages, risk factors may include: high blood pressure, high cholesterol, diabetes, obesity, sedentary life style and smoking.    Your diet should be low in fat, cholesterol, salt and carbohydrates, increase fruits (caution if diabetic), vegetables and whole grains/fiber rich foods.   Take all your cardiac  medications as prescribed.    Exercise is a very important factor in heart health. Once your post procedure restrictions have passed, you should engage in heart healthy, aerobic exercise. Be sure to have clearance from your cardiologist. Cardiac rehab programs could be extremely beneficial and your cardiologist could help set this up.   Follow up with your cardiologist within 1-2 weeks after your procedure.   Call your cardiologist or our unit ([phone]) with any questions or concerns that may arise.      SECONDARY DISCHARGE DIAGNOSES  Diagnosis: HTN (hypertension)  Assessment and Plan of Treatment: Continue to take antihypertensive medications as prescribed. Obtain a home blood pressure monitor (at any pharmacy or medical supply store) and monitor blood pressure trends. Call your doctor if you note you blood pressure to be running much higher or lower than usual. Limit salt intake.

## 2022-08-30 NOTE — DISCHARGE NOTE PROVIDER - HOSPITAL COURSE
Assessment:  89yo male with h/o HTN, HLD, obesity, DM, CAD with prior CABG 1995, LIMA to LAD is the only patent graft, subsequent PCIs, PAD/carotid disease, BiV heart failure, COVID 3/2022, OA, recently endorsing chest discomfort and NST with large zone lateral ischemia and LVEF 45%, underwent C 7/12/22 with patent LIMA, severe native CAD s/p PATRIC X 3 to RCA, now presents for stage PCI to lesion mLAD distal to LIMA anastomosis, procedure to be performed by Dr Rodarte.      Now s/p PATRIC to mLAD via LIMA graft via LFA, procedure performed by Dr. Rodarte, received heparin for A/C, Fentanyl and Versed IV intraprocedurally, arrived to recovery in NAD and HDS, Post PCI ECG with no acute changes, LFA access site stable with sheath in place for removal when ACT <180, no bleed/hematoma, distal pulse +, overnight admission given advanced age and lesion complexity.            Plan:  -Formal cath report pending  -Post procedure management/monitoring per protocol  -Access site precautions  -LFA sheath removal when ACT <180  -Bedrest x 4hours post sheath removal and achievement of hemostasis  -Labs and EKG in am  -Repeat ECG if any clinical indication or change on tele  -No IVH given heart failure status   -Continue current medical therapy  -Dual anti platelet therapy with aspirin/plavix   -Cont ARB with losartan 50mg po daily  -Restart lasix 40mg po daily on 8/31/22  -Cont statin therapy with Lipitor 40mg po qHS   -Educated regarding strict adherence with DAPT   -Educated regarding post procedure management and care  -Discussed the importance of RF modification  -Cardiac rehab info provided/referral and communication to cardiac rehab completed  -F/U outpt in 1-2 weeks with Cardiologist Dr. Rodarte  -DISPO: Plan for D/C in am if remains HDS, ECG and labs in am stable and without complications       Assessment:  89yo male with h/o HTN, HLD, obesity, DM, CAD with prior CABG 1995, LIMA to LAD is the only patent graft, subsequent PCIs, PAD/carotid disease, BiV heart failure, COVID 3/2022, OA, recently endorsing chest discomfort and NST with large zone lateral ischemia and LVEF 45%, underwent LHC 7/12/22 with patent LIMA, severe native CAD s/p PATRIC X 3 to RCA, now presents for stage PCI to lesion mLAD distal to LIMA anastomosis, procedure to be performed by Dr Rodarte.      Now s/p PATRIC to mLAD via LIMA graft via LFA, procedure performed by Dr. Rodarte, received heparin for A/C, Fentanyl and Versed IV intraprocedurally, arrived to recovery in NAD and HDS, Post PCI ECG with no acute changes, LFA access site stable with sheath in place for removal when ACT <180, no bleed/hematoma, distal pulse +, overnight admission given advanced age and lesion complexity.            Plan:  -Formal cath report pending  -Post procedure management/monitoring per protocol  -Access site precautions  -LFA sheath removal when ACT <180  -Bedrest x 4hours post sheath removal and achievement of hemostasis  -Labs and EKG in am  -Repeat ECG if any clinical indication or change on tele  -No IVH given heart failure status   -Continue current medical therapy  -Dual anti platelet therapy with aspirin/plavix   -Cont ARB with losartan 50mg po daily  -Restart lasix 40mg po daily on 8/31/22  -Cont statin therapy with Lipitor 40mg po qHS   -Educated regarding strict adherence with DAPT   -Educated regarding post procedure management and care  -Discussed the importance of RF modification  -Cardiac rehab info provided/referral and communication to cardiac rehab completed  -F/U outpt in 1-2 weeks with Cardiologist Dr. Rodarte  -DISPO: Plan for D/C in am if remains HDS, ECG and labs in am stable and without complications    No acute events overnight and endorses no complaints, remained HDS, ECG with more pronounced TWI/STD lateral leads and new TWI V2, denies CP, palpitations, SOB or dizziness, ECG reviewed by Dr Rodarte, tele with occasional PVC's, 20mEq potassium for K 3.9 and Mag 400mg po for Mg 1.8, Labs otherwise stable, LFA access site no bleed/hematoma, dp palpable, D/C instructions verbal and written provided, scripts sent to pharmacy as indicated, stable for D/C home.

## 2022-08-30 NOTE — DISCHARGE NOTE PROVIDER - CARE PROVIDER_API CALL
Abraham Rodarte)  Cardiovascular Disease; Interventional Cardiology  [address] [place] [address]  Phone: [phone]  Fax: [phone]  Follow Up Time:

## 2022-08-31 ENCOUNTER — TRANSCRIPTION ENCOUNTER (OUTPATIENT)
Age: 87
End: 2022-08-31

## 2022-08-31 VITALS
DIASTOLIC BLOOD PRESSURE: 70 MMHG | OXYGEN SATURATION: 96 % | HEART RATE: 62 BPM | RESPIRATION RATE: 18 BRPM | SYSTOLIC BLOOD PRESSURE: 126 MMHG

## 2022-08-31 LAB
A1C WITH ESTIMATED AVERAGE GLUCOSE RESULT: 6.5 % — HIGH (ref 4–5.6)
ALBUMIN SERPL ELPH-MCNC: 3.9 G/DL — SIGNIFICANT CHANGE UP (ref 3.3–5.2)
ALP SERPL-CCNC: 71 U/L — SIGNIFICANT CHANGE UP (ref 40–120)
ALT FLD-CCNC: 14 U/L — SIGNIFICANT CHANGE UP
ANION GAP SERPL CALC-SCNC: 12 MMOL/L — SIGNIFICANT CHANGE UP (ref 5–17)
AST SERPL-CCNC: 15 U/L — SIGNIFICANT CHANGE UP
BILIRUB SERPL-MCNC: 0.8 MG/DL — SIGNIFICANT CHANGE UP (ref 0.4–2)
BUN SERPL-MCNC: 17.3 MG/DL — SIGNIFICANT CHANGE UP (ref 8–20)
CALCIUM SERPL-MCNC: 9.5 MG/DL — SIGNIFICANT CHANGE UP (ref 8.4–10.5)
CHLORIDE SERPL-SCNC: 101 MMOL/L — SIGNIFICANT CHANGE UP (ref 98–107)
CHOLEST SERPL-MCNC: 115 MG/DL — SIGNIFICANT CHANGE UP
CO2 SERPL-SCNC: 24 MMOL/L — SIGNIFICANT CHANGE UP (ref 22–29)
CREAT SERPL-MCNC: 0.8 MG/DL — SIGNIFICANT CHANGE UP (ref 0.5–1.3)
EGFR: 84 ML/MIN/1.73M2 — SIGNIFICANT CHANGE UP
ESTIMATED AVERAGE GLUCOSE: 140 MG/DL — HIGH (ref 68–114)
GLUCOSE SERPL-MCNC: 140 MG/DL — HIGH (ref 70–99)
HCT VFR BLD CALC: 41.6 % — SIGNIFICANT CHANGE UP (ref 39–50)
HDLC SERPL-MCNC: 49 MG/DL — SIGNIFICANT CHANGE UP
HGB BLD-MCNC: 14.1 G/DL — SIGNIFICANT CHANGE UP (ref 13–17)
LIPID PNL WITH DIRECT LDL SERPL: 46 MG/DL — SIGNIFICANT CHANGE UP
MAGNESIUM SERPL-MCNC: 1.8 MG/DL — SIGNIFICANT CHANGE UP (ref 1.6–2.6)
MCHC RBC-ENTMCNC: 31.3 PG — SIGNIFICANT CHANGE UP (ref 27–34)
MCHC RBC-ENTMCNC: 33.9 GM/DL — SIGNIFICANT CHANGE UP (ref 32–36)
MCV RBC AUTO: 92.4 FL — SIGNIFICANT CHANGE UP (ref 80–100)
NON HDL CHOLESTEROL: 66 MG/DL — SIGNIFICANT CHANGE UP
PLATELET # BLD AUTO: 178 K/UL — SIGNIFICANT CHANGE UP (ref 150–400)
POTASSIUM SERPL-MCNC: 3.9 MMOL/L — SIGNIFICANT CHANGE UP (ref 3.5–5.3)
POTASSIUM SERPL-SCNC: 3.9 MMOL/L — SIGNIFICANT CHANGE UP (ref 3.5–5.3)
PROT SERPL-MCNC: 7 G/DL — SIGNIFICANT CHANGE UP (ref 6.6–8.7)
RBC # BLD: 4.5 M/UL — SIGNIFICANT CHANGE UP (ref 4.2–5.8)
RBC # FLD: 13.7 % — SIGNIFICANT CHANGE UP (ref 10.3–14.5)
SODIUM SERPL-SCNC: 137 MMOL/L — SIGNIFICANT CHANGE UP (ref 135–145)
TRIGL SERPL-MCNC: 100 MG/DL — SIGNIFICANT CHANGE UP
WBC # BLD: 7.84 K/UL — SIGNIFICANT CHANGE UP (ref 3.8–10.5)
WBC # FLD AUTO: 7.84 K/UL — SIGNIFICANT CHANGE UP (ref 3.8–10.5)

## 2022-08-31 PROCEDURE — 85027 COMPLETE CBC AUTOMATED: CPT

## 2022-08-31 PROCEDURE — 93010 ELECTROCARDIOGRAM REPORT: CPT

## 2022-08-31 PROCEDURE — 80061 LIPID PANEL: CPT

## 2022-08-31 PROCEDURE — 93005 ELECTROCARDIOGRAM TRACING: CPT

## 2022-08-31 PROCEDURE — 86900 BLOOD TYPING SEROLOGIC ABO: CPT

## 2022-08-31 PROCEDURE — 83036 HEMOGLOBIN GLYCOSYLATED A1C: CPT

## 2022-08-31 PROCEDURE — C1769: CPT

## 2022-08-31 PROCEDURE — C1894: CPT

## 2022-08-31 PROCEDURE — 80048 BASIC METABOLIC PNL TOTAL CA: CPT

## 2022-08-31 PROCEDURE — 80053 COMPREHEN METABOLIC PANEL: CPT

## 2022-08-31 PROCEDURE — 86850 RBC ANTIBODY SCREEN: CPT

## 2022-08-31 PROCEDURE — 36415 COLL VENOUS BLD VENIPUNCTURE: CPT

## 2022-08-31 PROCEDURE — 83735 ASSAY OF MAGNESIUM: CPT

## 2022-08-31 PROCEDURE — C9600: CPT | Mod: LD

## 2022-08-31 PROCEDURE — C1725: CPT

## 2022-08-31 PROCEDURE — C1874: CPT

## 2022-08-31 PROCEDURE — C1887: CPT

## 2022-08-31 PROCEDURE — 85025 COMPLETE CBC W/AUTO DIFF WBC: CPT

## 2022-08-31 PROCEDURE — 82962 GLUCOSE BLOOD TEST: CPT

## 2022-08-31 PROCEDURE — 86901 BLOOD TYPING SEROLOGIC RH(D): CPT

## 2022-08-31 RX ORDER — OXYBUTYNIN CHLORIDE 5 MG
1 TABLET ORAL
Qty: 30 | Refills: 0
Start: 2022-08-31 | End: 2022-09-29

## 2022-08-31 RX ORDER — METFORMIN HYDROCHLORIDE 850 MG/1
1 TABLET ORAL
Qty: 0 | Refills: 0 | DISCHARGE

## 2022-08-31 RX ORDER — POTASSIUM CHLORIDE 20 MEQ
20 PACKET (EA) ORAL ONCE
Refills: 0 | Status: COMPLETED | OUTPATIENT
Start: 2022-08-31 | End: 2022-08-31

## 2022-08-31 RX ORDER — CLOPIDOGREL BISULFATE 75 MG/1
1 TABLET, FILM COATED ORAL
Qty: 90 | Refills: 3
Start: 2022-08-31 | End: 2023-08-25

## 2022-08-31 RX ORDER — ATORVASTATIN CALCIUM 80 MG/1
0 TABLET, FILM COATED ORAL
Qty: 0 | Refills: 0 | DISCHARGE

## 2022-08-31 RX ORDER — MAGNESIUM OXIDE 400 MG ORAL TABLET 241.3 MG
400 TABLET ORAL ONCE
Refills: 0 | Status: COMPLETED | OUTPATIENT
Start: 2022-08-31 | End: 2022-08-31

## 2022-08-31 RX ADMIN — Medication 81 MILLIGRAM(S): at 08:41

## 2022-08-31 RX ADMIN — Medication 100 MILLIGRAM(S): at 05:41

## 2022-08-31 RX ADMIN — BRIMONIDINE TARTRATE 1 DROP(S): 2 SOLUTION/ DROPS OPHTHALMIC at 05:40

## 2022-08-31 RX ADMIN — CLOPIDOGREL BISULFATE 75 MILLIGRAM(S): 75 TABLET, FILM COATED ORAL at 08:42

## 2022-08-31 RX ADMIN — Medication 20 MILLIEQUIVALENT(S): at 08:41

## 2022-08-31 RX ADMIN — MAGNESIUM OXIDE 400 MG ORAL TABLET 400 MILLIGRAM(S): 241.3 TABLET ORAL at 08:41

## 2022-08-31 RX ADMIN — LOSARTAN POTASSIUM 50 MILLIGRAM(S): 100 TABLET, FILM COATED ORAL at 05:41

## 2022-08-31 NOTE — DISCHARGE NOTE NURSING/CASE MANAGEMENT/SOCIAL WORK - PATIENT PORTAL LINK FT
You can access the FollowMyHealth Patient Portal offered by [facility] by registering at the following website: [email or web address]. By joining [employer]’s FollowMyHealth portal, you will also be able to view your health information using other applications (apps) compatible with our system.

## 2022-10-07 ENCOUNTER — NON-APPOINTMENT (OUTPATIENT)
Age: 87
End: 2022-10-07

## 2022-10-14 ENCOUNTER — APPOINTMENT (OUTPATIENT)
Dept: ORTHOPEDIC SURGERY | Facility: CLINIC | Age: 87
End: 2022-10-14

## 2022-10-27 ENCOUNTER — APPOINTMENT (OUTPATIENT)
Dept: ORTHOPEDIC SURGERY | Facility: CLINIC | Age: 87
End: 2022-10-27

## 2022-10-27 VITALS
DIASTOLIC BLOOD PRESSURE: 61 MMHG | BODY MASS INDEX: 25.66 KG/M2 | HEIGHT: 62.5 IN | WEIGHT: 143 LBS | SYSTOLIC BLOOD PRESSURE: 143 MMHG | HEART RATE: 64 BPM

## 2022-10-27 DIAGNOSIS — M54.50 LOW BACK PAIN, UNSPECIFIED: ICD-10-CM

## 2022-10-27 DIAGNOSIS — M25.561 PAIN IN RIGHT KNEE: ICD-10-CM

## 2022-10-27 DIAGNOSIS — M25.579 PAIN IN UNSPECIFIED ANKLE AND JOINTS OF UNSPECIFIED FOOT: ICD-10-CM

## 2022-10-27 PROCEDURE — 73564 X-RAY EXAM KNEE 4 OR MORE: CPT | Mod: RT

## 2022-10-27 PROCEDURE — 99213 OFFICE O/P EST LOW 20 MIN: CPT

## 2022-10-27 NOTE — PHYSICAL EXAM
[de-identified] : GENERAL APPEARANCE: Well nourished and hydrated, pleasant, alert, and oriented x 3. Appears their stated age. \par HEENT: Normocephalic, extraocular eye motion intact. Nasal septum midline. Oral cavity clear. External auditory canal clear. \par RESPIRATORY: Breath sounds clear and audible in all lobes. No wheezing, No accessory muscle use.\par CARDIOVASCULAR: No apparent abnormalities. No lower leg edema. No varicosities. Pedal pulses are palpable.\par NEUROLOGIC: Sensation is normal, no muscle weakness in the upper or lower extremities.\par DERMATOLOGIC: No apparent skin lesions, moist, warm, no rash.\par SPINE: Cervical spine appears normal and moves freely; thoracic spine appears normal and moves freely; there is tenderness palpation around the lumbar spine and pain with range of motion\par MUSCULOSKELETAL: Hands, wrists, and elbows are normal and move freely, shoulders are normal and move freely. \par Psychiatric: Oriented to person, place, and time, insight and judgement were intact and the affect was normal. \par Musculoskeletal:. Right knee exam shows no effusion, ROM is 0-1 30 degrees, no instability, no pain with Artemio, no joint line tenderness.  Hip full range of motion without pain\par 5/5 motor strength in bilateral lower extremities. Sensory: Intact in bilateral lower extremities. DTRs: Biceps, brachioradialis, triceps, patellar, ankle and plantar 2+ and symmetric bilaterally. Pulses: dorsalis pedis, posterior tibial, femoral, popliteal, and radial 2+ and symmetric bilaterally. \par Constitutional: Alert and in no acute distress, but well-appearing. \par  [de-identified] : 4 views of the right knee obtained the office today show no acute fracture or dislocation.  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

## 2022-10-27 NOTE — DISCUSSION/SUMMARY
[Medication Risks Reviewed] : Medication risks reviewed [Surgical risks reviewed] : Surgical risks reviewed [de-identified] : Patient is a 89-year-old male with left foot and ankle as well as low back pain presenting today for initial valuation.  He is not having any pain in his knee.  His x-rays are relatively benign.  I do think the majority the pain is experiencing is coming from his lumbar radiculopathy as well as possible foot and ankle pathology.  I therefore given a referral to foot and ankle surgery for further evaluation management.  I have given him referral to physiatry for his low back pain.  I did advise him that I see no evidence of internal pathology in his knee exam today.  I will see him back on an as-needed basis for his right knee.  All questions asked and answered

## 2022-10-27 NOTE — HISTORY OF PRESENT ILLNESS
[Worsening] : worsening [Sitting] : sitting [Standing] : standing [Daily] : ~He/She~ states the symptoms seem to be occuring daily [Bending] : worsened by bending [Direct Pressure] : worsened by direct pressure [Hip Movement] : worsened by hip movement [Walking] : worsened by walking [de-identified] : 89-year-old male with past medical history of hypertension, hyperlipidemia, diabetes, recent stent placement on Plavix presents to office for initial evaluation of right lower extremity pain.  The patient states that he has had pain in his right lower extremity for the past 3 months.  Does not recall an injury or fall.  States that the pain is mostly in his right foot and ankle and radiates up to his knee and then into his hip and back as well.  He went to urgent care where they ruled out a DVT and diagnosed with arthritis.  He states that his pain occurs daily and makes it difficult to walk and get dressed. Takes Tylenol and uses analgesic cream with moderate relief of pain.  He uses a cane to assist with ambulation daily.  He has not been to physical therapy and denies any injections or surgeries in the right lower extremity aside from the access point in the right groin for the stent placement.  Denies any recent falls or injuries, numbness or tingling in the right lower extremity, locking catching or buckling of the knee or hip, swelling of the right lower extremity. [Knee Flexion] : not worsened by knee flexion [Knee Extension] : not worsened by knee extension

## 2022-11-01 NOTE — ED ADULT NURSE NOTE - IN THE PAST 12 MONTHS HAVE YOU USED DRUGS OTHER THAN THOSE REQUIRED FOR MEDICAL REASON?
[address] Emergency Department  [address] [facility] [facility] [address]  Phone: [phone]  Fax: 2002 Luis Garcia      Pt Name: Nelida Ayon  MRN: [id number]  [place] 1939  Date of evaluation: 11/1/2022  Provider: Laila Valdez DO    CHIEF COMPLAINT       Chief Complaint   Patient presents with    Fall     Pt presents from [facility] via ems dt fall that occurred shortly prior to arrival. Pt states he was in bed and accidentally rolled out of bed hitting the wooden nightstand. Pt denies loc, headache , n/v. Pt tetanus status is unknown. Pt states he did not take any medication for pain prior to arrival.          HISTORY OF PRESENT ILLNESS   (Location/Symptom, Timing/Onset,Context/Setting, Quality, Duration, Modifying Factors, Severity)  Note limiting factors. Nelida Ayon is a 80 y.o. male who presents to the emergency department for the evaluation of minor head injury. Patient states he was sleeping in his bed tonight when he rolled over and struck his head on a piece of furniture. Patient sustained a laceration to the nose and the right side of the forehead and a hematoma to the forehead. He states he did not fall onto anything. States he did not hurt his neck. Patient denies neck pain. Patient denies numbness, tingling or weakness in the arms or legs. Patient denies loss of consciousness or vomiting. Patient is not on blood thinner medication. Patient denies any other abnormalities or complaints at this time. Nursing Notes were reviewed. REVIEW OF SYSTEMS    (2-9systems for level 4, 10 or more for level 5)     Review of Systems   Constitutional:  Negative for fever. Respiratory:  Negative for shortness of breath. Cardiovascular:  Negative for chest pain. Gastrointestinal:  Negative for nausea and vomiting. Musculoskeletal:  Negative for neck pain.    Skin:  Positive for wound. Negative for rash. Neurological:  Negative for weakness. All other systems reviewed and are negative. Except asnoted above the remainder of the review of systems was reviewed and negative. PAST MEDICAL HISTORY     Past Medical History:   Diagnosis Date    Atherosclerotic heart disease     Benign prostatic hyperplasia     Cellulitis of left lower limb     Essential (primary) hypertension     Hyperlipidemia     Major depressive disorder     Periodic limb movement disorder     Restless leg syndrome     Thrombocytopenia (HCC)     Unspecified dementia, mild, without behavioral disturbance, psychotic disturbance, mood disturbance, and anxiety          SURGICAL HISTORY     History reviewed. No pertinent surgical history. CURRENT MEDICATIONS     Previous Medications    No medications on file       ALLERGIES     Patient has no known allergies. FAMILY HISTORY     History reviewed. No pertinent family history. SOCIAL HISTORY       Social History     Socioeconomic History    Marital status:      Spouse name: None    Number of children: None    Years of education: None    Highest education level: None   Tobacco Use    Smoking status: Never    Smokeless tobacco: Never   Substance and Sexual Activity    Alcohol use: Never    Drug use: Never       SCREENINGS    Macy Coma Scale  Eye Opening: Spontaneous  Best Verbal Response: Oriented  Best Motor Response: Obeys commands  Aga Coma Scale Score: 15        PHYSICAL EXAM    (up to 7 for level 4, 8 or more for level 5)     ED Triage Vitals [11/01/22 0007]   BP Temp Temp Source Heart Rate Resp SpO2 Height Weight   (!) 170/102 97 °F (36.1 °C) Temporal 62 16 99 % 5' 11\" (1.803 m) 150 lb (68 kg)       Physical Exam  Vitals and nursing note reviewed. Constitutional:       General: He is not in acute distress. Appearance: Normal appearance. He is not ill-appearing or toxic-appearing. HENT:      Head: Normocephalic. Comments:  While overall the head does not show signs of significant trauma there is a hematoma on the forehead. There is a small laceration near the bridge of the nose as well as a small laceration on the right side of the forehead that we will need to clean up and evaluate to see if it needs suture repair. Nose: Nose normal. No congestion. Mouth/Throat:      Mouth: Mucous membranes are moist.   Eyes:      General:         Right eye: No discharge. Left eye: No discharge. Extraocular Movements: Extraocular movements intact. Conjunctiva/sclera: Conjunctivae normal.      Pupils: Pupils are equal, round, and reactive to light. Cardiovascular:      Rate and Rhythm: Normal rate and regular rhythm. Pulses: Normal pulses. Heart sounds: Normal heart sounds. No murmur heard. Pulmonary:      Effort: Pulmonary effort is normal. No respiratory distress. Breath sounds: Normal breath sounds. No wheezing. Abdominal:      General: Abdomen is flat. There is no distension. Palpations: Abdomen is soft. There is no pulsatile mass. Tenderness: There is no abdominal tenderness. There is no guarding or rebound. Comments: No pulsatile mass   Musculoskeletal:         General: No deformity or signs of injury. Normal range of motion. Cervical back: Normal range of motion. No tenderness. Skin:     General: Skin is warm and dry. Capillary Refill: Capillary refill takes less than 2 seconds. Findings: No rash. Neurological:      General: No focal deficit present. Mental Status: He is alert and oriented to person, place, and time. Mental status is at baseline. Sensory: No sensory deficit. Motor: No weakness. Comments: Speaking normally. No facial asymmetry. Moving all 4 extremities.     Psychiatric:         Mood and Affect: Mood normal.       EMERGENCY DEPARTMENT COURSE and DIFFERENTIAL DIAGNOSIS/MDM:   Vitals:    Vitals:    11/01/22 0007   BP: (!) 170/102   Pulse: 62   Resp: 16   Temp: 97 °F (36.1 °C)   TempSrc: Temporal   SpO2: 99%   Weight: 68 kg (150 lb)   Height: 5' 11\" (1.803 m)       Patient presents to the emergency department with the complaint described above. Vital signs show hypertension, otherwise unremarkable. Physical examination reveals evidence of minor head injury with some small lacerations that will need further evaluation. Going to obtain CT scan of the head without contrast based on his age, presentation, my evaluation and Nexus 2 criteria. I do not think any imaging of the cervical spine is indicated. Will reevaluate after CT and cleaning the wounds      DIAGNOSTIC RESULTS     LABS:  Labs Reviewed - No data to display    All other labs were within normal range or not returned as of this dictation. RADIOLOGY:  CT HEAD WO CONTRAST   Final Result   No acute intracranial abnormality. Postsurgical changes right-sided   craniectomy. Soft tissue hematoma right forehead. Prominent ventricles and sulci, consistent with moderate parenchymal volume   loss. ED Course as of 11/01/22 0158   Tue Nov 01, 2022   0144 No acute intracranial abnormality noted [TS]   0154 On reevaluation, patient has improved. I did repair the wounds with Dermabond. I have discussed wound care and follow-up as well as close head injury and follow-up and what to return to ER for    At this time the patient is without objective evidence of an acute process requiring hospitalization or inpatient management. They have remained hemodynamically stable and are stable for discharge with outpatient follow-up. Standard anticipatory guidance given to patient upon discharge. Have given them a specific time frame in which to follow-up and who to follow-up with. I have also advised them that they should return to the emergency department if they get worse, or not getting better or develop any new or concerning symptoms.   Patient demonstrates understanding.   [TS] ED Course User Index  [TS] Angelica Dillard DO         PROCEDURES:  Unless otherwise noted below, none     Lac Repair    Date/Time: 11/1/2022 1:56 AM  Performed by: Angelica Dillard DO  Authorized by: Angelica Dillard DO     Consent:     Consent obtained:  Verbal    Consent given by:  Patient    Risks discussed:  Poor cosmetic result, need for additional repair, infection and poor wound healing  Universal protocol:     Procedure explained and questions answered to patient or proxy's satisfaction: yes      Immediately prior to procedure, a time out was called: yes      Patient identity confirmed:  Verbally with patient and arm band  Anesthesia:     Anesthesia method:  None  Laceration details:     Location:  Face    Face location:  R cheek    Wound length (cm): 1. Exploration:     Wound exploration: entire depth of wound visualized      Wound extent: no fascia violation noted, no foreign bodies/material noted and no underlying fracture noted      Contaminated: no    Treatment:     Area cleansed with:  Chlorhexidine    Amount of cleaning:  Standard    Irrigation solution:  Tap water    Debridement:  None  Skin repair:     Repair method:  Tissue adhesive  Approximation:     Approximation:  Close  Repair type:     Repair type:  Simple  Post-procedure details:     Dressing:  Open (no dressing)  Comments:      Patient basically had a couple small abrasions on the face that were bleeding, nothing that was really suturable but I did use tissue adhesive to try to help with wound closure and healing    FINAL IMPRESSION      1. Injury of head, initial encounter    2.  Facial laceration, initial encounter          DISPOSITION/PLAN   DISPOSITION Decision To Discharge 11/01/2022 01:55:05 AM      PATIENT REFERRED TO:  Kayley Kaba MD  [address]  [phone]    In 3 days  For wound re-check    DISCHARGE MEDICATIONS:  New Prescriptions    No medications on file          (Please note that portions of this note were completed with a voice recognition program.  Efforts were made to edit the dictations but occasionally words are mis-transcribed.)    Johnny Myles DO,(electronically signed)  Board Certified Emergency Physician          Johnny Myles DO  11/01/22 1050 No

## 2022-11-09 ENCOUNTER — APPOINTMENT (OUTPATIENT)
Dept: PHYSICAL MEDICINE AND REHAB | Facility: CLINIC | Age: 87
End: 2022-11-09

## 2022-12-12 ENCOUNTER — INPATIENT (INPATIENT)
Facility: HOSPITAL | Age: 87
LOS: 1 days | Discharge: HOME | DRG: 286 | End: 2022-12-14
Attending: GENERAL ACUTE CARE HOSPITAL | Admitting: GENERAL ACUTE CARE HOSPITAL
Payer: COMMERCIAL

## 2022-12-12 VITALS
SYSTOLIC BLOOD PRESSURE: 158 MMHG | DIASTOLIC BLOOD PRESSURE: 65 MMHG | TEMPERATURE: 98 F | HEART RATE: 76 BPM | OXYGEN SATURATION: 98 % | RESPIRATION RATE: 18 BRPM

## 2022-12-12 DIAGNOSIS — R07.9 CHEST PAIN, UNSPECIFIED: ICD-10-CM

## 2022-12-12 DIAGNOSIS — Z95.1 PRESENCE OF AORTOCORONARY BYPASS GRAFT: Chronic | ICD-10-CM

## 2022-12-12 DIAGNOSIS — Z95.5 PRESENCE OF CORONARY ANGIOPLASTY IMPLANT AND GRAFT: Chronic | ICD-10-CM

## 2022-12-12 LAB
ALBUMIN SERPL ELPH-MCNC: 3.7 G/DL — SIGNIFICANT CHANGE UP (ref 3.3–5.2)
ALP SERPL-CCNC: 124 U/L — HIGH (ref 40–120)
ALT FLD-CCNC: 78 U/L — HIGH
ANION GAP SERPL CALC-SCNC: 11 MMOL/L — SIGNIFICANT CHANGE UP (ref 5–17)
AST SERPL-CCNC: 77 U/L — HIGH
BASOPHILS # BLD AUTO: 0.02 K/UL — SIGNIFICANT CHANGE UP (ref 0–0.2)
BASOPHILS NFR BLD AUTO: 0.3 % — SIGNIFICANT CHANGE UP (ref 0–2)
BILIRUB SERPL-MCNC: 0.5 MG/DL — SIGNIFICANT CHANGE UP (ref 0.4–2)
BUN SERPL-MCNC: 29.9 MG/DL — HIGH (ref 8–20)
CALCIUM SERPL-MCNC: 9.1 MG/DL — SIGNIFICANT CHANGE UP (ref 8.4–10.5)
CHLORIDE SERPL-SCNC: 104 MMOL/L — SIGNIFICANT CHANGE UP (ref 96–108)
CO2 SERPL-SCNC: 25 MMOL/L — SIGNIFICANT CHANGE UP (ref 22–29)
CREAT SERPL-MCNC: 0.97 MG/DL — SIGNIFICANT CHANGE UP (ref 0.5–1.3)
EGFR: 74 ML/MIN/1.73M2 — SIGNIFICANT CHANGE UP
EOSINOPHIL # BLD AUTO: 0.14 K/UL — SIGNIFICANT CHANGE UP (ref 0–0.5)
EOSINOPHIL NFR BLD AUTO: 1.8 % — SIGNIFICANT CHANGE UP (ref 0–6)
FLUAV AG NPH QL: SIGNIFICANT CHANGE UP
FLUBV AG NPH QL: SIGNIFICANT CHANGE UP
GLUCOSE BLDC GLUCOMTR-MCNC: 131 MG/DL — HIGH (ref 70–99)
GLUCOSE BLDC GLUCOMTR-MCNC: 137 MG/DL — HIGH (ref 70–99)
GLUCOSE BLDC GLUCOMTR-MCNC: 182 MG/DL — HIGH (ref 70–99)
GLUCOSE SERPL-MCNC: 124 MG/DL — HIGH (ref 70–99)
HCT VFR BLD CALC: 41 % — SIGNIFICANT CHANGE UP (ref 39–50)
HGB BLD-MCNC: 13.3 G/DL — SIGNIFICANT CHANGE UP (ref 13–17)
IMM GRANULOCYTES NFR BLD AUTO: 0.3 % — SIGNIFICANT CHANGE UP (ref 0–0.9)
LYMPHOCYTES # BLD AUTO: 3.18 K/UL — SIGNIFICANT CHANGE UP (ref 1–3.3)
LYMPHOCYTES # BLD AUTO: 39.8 % — SIGNIFICANT CHANGE UP (ref 13–44)
MAGNESIUM SERPL-MCNC: 1.9 MG/DL — SIGNIFICANT CHANGE UP (ref 1.6–2.6)
MCHC RBC-ENTMCNC: 30.3 PG — SIGNIFICANT CHANGE UP (ref 27–34)
MCHC RBC-ENTMCNC: 32.4 GM/DL — SIGNIFICANT CHANGE UP (ref 32–36)
MCV RBC AUTO: 93.4 FL — SIGNIFICANT CHANGE UP (ref 80–100)
MONOCYTES # BLD AUTO: 0.65 K/UL — SIGNIFICANT CHANGE UP (ref 0–0.9)
MONOCYTES NFR BLD AUTO: 8.1 % — SIGNIFICANT CHANGE UP (ref 2–14)
NEUTROPHILS # BLD AUTO: 3.99 K/UL — SIGNIFICANT CHANGE UP (ref 1.8–7.4)
NEUTROPHILS NFR BLD AUTO: 49.7 % — SIGNIFICANT CHANGE UP (ref 43–77)
NT-PROBNP SERPL-SCNC: 2863 PG/ML — HIGH (ref 0–300)
PLATELET # BLD AUTO: 191 K/UL — SIGNIFICANT CHANGE UP (ref 150–400)
POTASSIUM SERPL-MCNC: 4.2 MMOL/L — SIGNIFICANT CHANGE UP (ref 3.5–5.3)
POTASSIUM SERPL-SCNC: 4.2 MMOL/L — SIGNIFICANT CHANGE UP (ref 3.5–5.3)
PROT SERPL-MCNC: 6.8 G/DL — SIGNIFICANT CHANGE UP (ref 6.6–8.7)
RBC # BLD: 4.39 M/UL — SIGNIFICANT CHANGE UP (ref 4.2–5.8)
RBC # FLD: 13.9 % — SIGNIFICANT CHANGE UP (ref 10.3–14.5)
RSV RNA NPH QL NAA+NON-PROBE: SIGNIFICANT CHANGE UP
SARS-COV-2 RNA SPEC QL NAA+PROBE: SIGNIFICANT CHANGE UP
SODIUM SERPL-SCNC: 140 MMOL/L — SIGNIFICANT CHANGE UP (ref 135–145)
TROPONIN T SERPL-MCNC: <0.01 NG/ML — SIGNIFICANT CHANGE UP (ref 0–0.06)
WBC # BLD: 8 K/UL — SIGNIFICANT CHANGE UP (ref 3.8–10.5)
WBC # FLD AUTO: 8 K/UL — SIGNIFICANT CHANGE UP (ref 3.8–10.5)

## 2022-12-12 PROCEDURE — 99236 HOSP IP/OBS SAME DATE HI 85: CPT

## 2022-12-12 PROCEDURE — 71045 X-RAY EXAM CHEST 1 VIEW: CPT | Mod: 26

## 2022-12-12 PROCEDURE — 99223 1ST HOSP IP/OBS HIGH 75: CPT

## 2022-12-12 PROCEDURE — 93010 ELECTROCARDIOGRAM REPORT: CPT

## 2022-12-12 RX ORDER — DEXTROSE 50 % IN WATER 50 %
15 SYRINGE (ML) INTRAVENOUS ONCE
Refills: 0 | Status: DISCONTINUED | OUTPATIENT
Start: 2022-12-12 | End: 2022-12-14

## 2022-12-12 RX ORDER — DEXTROSE 50 % IN WATER 50 %
25 SYRINGE (ML) INTRAVENOUS ONCE
Refills: 0 | Status: DISCONTINUED | OUTPATIENT
Start: 2022-12-12 | End: 2022-12-14

## 2022-12-12 RX ORDER — LIDOCAINE 4 G/100G
1 CREAM TOPICAL ONCE
Refills: 0 | Status: COMPLETED | OUTPATIENT
Start: 2022-12-12 | End: 2022-12-12

## 2022-12-12 RX ORDER — CEFPODOXIME PROXETIL 100 MG
1 TABLET ORAL
Qty: 0 | Refills: 0 | DISCHARGE

## 2022-12-12 RX ORDER — FUROSEMIDE 40 MG
40 TABLET ORAL DAILY
Refills: 0 | Status: DISCONTINUED | OUTPATIENT
Start: 2022-12-13 | End: 2022-12-14

## 2022-12-12 RX ORDER — FUROSEMIDE 40 MG
40 TABLET ORAL ONCE
Refills: 0 | Status: COMPLETED | OUTPATIENT
Start: 2022-12-12 | End: 2022-12-12

## 2022-12-12 RX ORDER — BRIMONIDINE TARTRATE 2 MG/MG
1 SOLUTION/ DROPS OPHTHALMIC
Refills: 0 | Status: DISCONTINUED | OUTPATIENT
Start: 2022-12-12 | End: 2022-12-14

## 2022-12-12 RX ORDER — ATORVASTATIN CALCIUM 80 MG/1
20 TABLET, FILM COATED ORAL AT BEDTIME
Refills: 0 | Status: DISCONTINUED | OUTPATIENT
Start: 2022-12-12 | End: 2022-12-12

## 2022-12-12 RX ORDER — LOSARTAN POTASSIUM 100 MG/1
50 TABLET, FILM COATED ORAL DAILY
Refills: 0 | Status: DISCONTINUED | OUTPATIENT
Start: 2022-12-12 | End: 2022-12-14

## 2022-12-12 RX ORDER — ASPIRIN/CALCIUM CARB/MAGNESIUM 324 MG
162 TABLET ORAL ONCE
Refills: 0 | Status: COMPLETED | OUTPATIENT
Start: 2022-12-12 | End: 2022-12-12

## 2022-12-12 RX ORDER — GLUCAGON INJECTION, SOLUTION 0.5 MG/.1ML
1 INJECTION, SOLUTION SUBCUTANEOUS ONCE
Refills: 0 | Status: DISCONTINUED | OUTPATIENT
Start: 2022-12-12 | End: 2022-12-14

## 2022-12-12 RX ORDER — FUROSEMIDE 40 MG
40 TABLET ORAL DAILY
Refills: 0 | Status: DISCONTINUED | OUTPATIENT
Start: 2022-12-12 | End: 2022-12-12

## 2022-12-12 RX ORDER — SODIUM CHLORIDE 9 MG/ML
1000 INJECTION, SOLUTION INTRAVENOUS
Refills: 0 | Status: DISCONTINUED | OUTPATIENT
Start: 2022-12-12 | End: 2022-12-14

## 2022-12-12 RX ORDER — ATORVASTATIN CALCIUM 80 MG/1
40 TABLET, FILM COATED ORAL AT BEDTIME
Refills: 0 | Status: DISCONTINUED | OUTPATIENT
Start: 2022-12-12 | End: 2022-12-14

## 2022-12-12 RX ORDER — ASPIRIN/CALCIUM CARB/MAGNESIUM 324 MG
81 TABLET ORAL DAILY
Refills: 0 | Status: DISCONTINUED | OUTPATIENT
Start: 2022-12-12 | End: 2022-12-14

## 2022-12-12 RX ORDER — HYDRALAZINE HCL 50 MG
10 TABLET ORAL EVERY 6 HOURS
Refills: 0 | Status: DISCONTINUED | OUTPATIENT
Start: 2022-12-12 | End: 2022-12-14

## 2022-12-12 RX ORDER — DEXTROSE 50 % IN WATER 50 %
12.5 SYRINGE (ML) INTRAVENOUS ONCE
Refills: 0 | Status: DISCONTINUED | OUTPATIENT
Start: 2022-12-12 | End: 2022-12-14

## 2022-12-12 RX ORDER — INSULIN LISPRO 100/ML
VIAL (ML) SUBCUTANEOUS
Refills: 0 | Status: DISCONTINUED | OUTPATIENT
Start: 2022-12-12 | End: 2022-12-14

## 2022-12-12 RX ORDER — PANTOPRAZOLE SODIUM 20 MG/1
40 TABLET, DELAYED RELEASE ORAL
Refills: 0 | Status: DISCONTINUED | OUTPATIENT
Start: 2022-12-12 | End: 2022-12-14

## 2022-12-12 RX ORDER — TIMOLOL 0.5 %
1 DROPS OPHTHALMIC (EYE) DAILY
Refills: 0 | Status: DISCONTINUED | OUTPATIENT
Start: 2022-12-12 | End: 2022-12-14

## 2022-12-12 RX ORDER — CLOPIDOGREL BISULFATE 75 MG/1
75 TABLET, FILM COATED ORAL DAILY
Refills: 0 | Status: DISCONTINUED | OUTPATIENT
Start: 2022-12-12 | End: 2022-12-14

## 2022-12-12 RX ORDER — UBIDECARENONE 100 MG
0 CAPSULE ORAL
Qty: 0 | Refills: 0 | DISCHARGE

## 2022-12-12 RX ORDER — METOPROLOL TARTRATE 50 MG
25 TABLET ORAL DAILY
Refills: 0 | Status: DISCONTINUED | OUTPATIENT
Start: 2022-12-12 | End: 2022-12-14

## 2022-12-12 RX ORDER — ACETAMINOPHEN 500 MG
975 TABLET ORAL ONCE
Refills: 0 | Status: COMPLETED | OUTPATIENT
Start: 2022-12-12 | End: 2022-12-12

## 2022-12-12 RX ADMIN — CLOPIDOGREL BISULFATE 75 MILLIGRAM(S): 75 TABLET, FILM COATED ORAL at 18:04

## 2022-12-12 RX ADMIN — Medication 40 MILLIGRAM(S): at 22:26

## 2022-12-12 RX ADMIN — ATORVASTATIN CALCIUM 40 MILLIGRAM(S): 80 TABLET, FILM COATED ORAL at 22:28

## 2022-12-12 RX ADMIN — LIDOCAINE 1 PATCH: 4 CREAM TOPICAL at 09:20

## 2022-12-12 RX ADMIN — Medication 975 MILLIGRAM(S): at 09:19

## 2022-12-12 RX ADMIN — Medication 162 MILLIGRAM(S): at 04:38

## 2022-12-12 NOTE — ED ADULT NURSE NOTE - OBJECTIVE STATEMENT
pt to ED with complaints of chest pressure. as per pt he started having chest pressure last night around 9 pm. pt reports occasional SOB with the pressure. pt denies any pain. pt denies any current pressure/pain/sob/N/V/D/chills.

## 2022-12-12 NOTE — H&P ADULT - CONVERSATION DETAILS
Patient's end of life wishes discussed given his advanced age and cardiac comorbidities. Patient reports he is active, independent and still works at this time. States that he would want his wife to be his HCP. However, he has never given any thought to if he would want CPR or mechanical ventilation. Encouraged patient to have discussions with his wife and family in efforts to express what he would or would not want done in the event his heart would to stop. At this time, patient would like all necessary interventions, including chest compressions, defibrillations and intubation in the event they are required.

## 2022-12-12 NOTE — ED ADULT NURSE REASSESSMENT NOTE - NS ED NURSE REASSESS COMMENT FT1
received pt in observation unit. pt a&ox3, denies any pain/discomfort at this time. wife @ bedside. returned from echo. pending results for dispo. updated on plan of care, verbalize understanding. call bell in reach

## 2022-12-12 NOTE — ED PROVIDER NOTE - CLINICAL SUMMARY MEDICAL DECISION MAKING FREE TEXT BOX
Chest pain now resolved with significant ACS risk factors, ECG not acutely ischemic, first troponin negative. Will keep in ED obs for serial enzymes, tele monitoring, TTE and cardiology eval.

## 2022-12-12 NOTE — ED ADULT NURSE REASSESSMENT NOTE - NS ED NURSE REASSESS COMMENT FT1
assumed care of pt at this time. pt a+ox3, sitting up in bed, reports lower back pain from "sitting in stretcher since yesterday". pt requesting breakfast, informed pt will obtain meal from diet office. pt in no apparent distress, denies chest pain or SOB @ this time. pt awaiting repeat bloodwork and EKG. will continue to monitor.

## 2022-12-12 NOTE — H&P ADULT - CONSTITUTIONAL COMMENTS
elderly male, looks younger than stated age, ambulating independently in the ED, not in acute distress

## 2022-12-12 NOTE — ED CDU PROVIDER DISPOSITION NOTE - CLINICAL COURSE
89yom w/ CAD s/p CABG w/ recent PCI p/w chest pain. Had acute onset of pressure like pain in the center of the chest with associated shortness of breath. Serial troponins negative. Echo with reduced EF 35% compared to prior seen by cardiology recommended admission for cath with Dr. Rodarte tomorrow

## 2022-12-12 NOTE — H&P ADULT - HISTORY OF PRESENT ILLNESS
Patient is a 89 year old male with PMH of CAD s/p CABG, s/p PCI, DM, CAD, HTN, GERD, OA, Ascending Thoracic Aortic Aneurysm Carotid Stenosis, HFrEF, Claudication, HLD and Glaucoma who presented to the ED with two day history of chest pressure.  Patient reports being in his usual state of health until he was outside helping his grandson move some things around when the chest pressure started. States it is midsternal and does not radiate. He reports belching helps alleviate his symptoms, but since they were persistent he decided to come to the ED for further evaluation. Furthermore, patient reports the chest pressure is associated with heavy breathing, but denies associated dizziness, nausea, or diaphoresis. In the ED, patient did not have any new EKG changes and serial TNI were negative. However, a TTE was obtained which revealed a new reduction in his EF and segmental wall motion abnormalities. Patient was seen by cardiology and admission for [facility] was recommended. Patient seen and examined, reports the chest pressure is mild currently. Denies any lightheadedness, dizziness, SOB at rest, palpitations, nausea, vomiting, abdominal pain, dysuria, diarrhea, fevers or chills.

## 2022-12-12 NOTE — ED CDU PROVIDER INITIAL DAY NOTE - PROGRESS NOTE DETAILS
Pt signed out to me at 7AM, seen with wife at bedside. pt is c/o pain radiating from right hip/lower back down RLE which has been ongoing x 1-2 months with no fall or trauma. reports he had an outpatient US negative for DVT and was told he has a pinched nerve as he has a lot of arthritis in his back. he is pending outpatient MRI for further evaluation. . pt has trace edema of b/l LE, no calf tenderness, no erythema, 2+ distal pulses , full ROM with 5/5 strength and sensation intact. will give tylenol and lido patch.  as far as pt's chest pain reports it is intermittent over the last month and he describes palpitations and shortness of breath when it occurs. pending serial trop/ekg, echo and cardiology consult, continue telemetry monitoring.

## 2022-12-12 NOTE — ED PROVIDER NOTE - OBJECTIVE STATEMENT
89yom w/ CAD s/p CABG w/ recent PCI p/w chest pain. Had acute onset of pressure like pain in the center of the chest with associated shortness of breath, lasted a few hours, onset while at rest with no provocation, now resolved without intervention.

## 2022-12-12 NOTE — ED CDU PROVIDER DISPOSITION NOTE - NS ED ATTENDING STATEMENT MOD
This was a shared visit with the DANDRE. I reviewed and verified the documentation and independently performed the documented:

## 2022-12-12 NOTE — H&P ADULT - ASSESSMENT
Patient is a 89 year old male with PMH of CAD s/p CABG, s/p PCI, DM, HTN, GERD, OA, Ascending Thoracic Aortic Aneurysm, Carotid Stenosis, HFrEF, Claudication, HLD and Glaucoma who presented to the ED with two day history of chest pressure and found to have a newly reduced EF with segmental wall motion abnormalities.     1. Chest pressure; history of CAD s/p CABG  -admit to telemetry  -serial TNI negative x 3  -EKG without new ischemic changes  -TTE with newly reduced EF 30-35% with segmental wall motion abnormalities  -continue aspirin, plavix and increase lipitor  -check HbA1c and lipid panel  -NPO after midnight for [facility] on 12/13  -cardio consult appreciated    2. 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 Claudication  -patient with chronically cool/cyanotic feet  -reports having an angiogram recently but unable to recall vascular surgeons name  -pulses present  -continue ASA, plavix and high intensity statin  -obtain outpatient vascular surgery records (patient will find out the name of the physician)    4. DM  -check HbA1c  -hold metformin  -ISS  -ADA diet    5. HTN  -BP uncontrolled  -did not receive PO medications today  -lasix, toprol and losartan as above  -low sodium diet    6. GERD  -continue PPI    7. Glaucoma  -continue timolol and brimonidine     8. HLD  -check lipid panel  -continue statin    9. History of Carotid Stenosis  -continue DAPT and statin     DVT ppx- SCDs     Dispo - NPO after midnight for [facility] on 12/13. Patient is a 89 year old male with PMH of CAD s/p CABG, s/p PCI, DM, HTN, GERD, OA, Ascending Thoracic Aortic Aneurysm, Carotid Stenosis, HFrEF, Claudication, HLD and Glaucoma who presented to the ED with two day history of chest pressure and found to have a newly reduced EF with segmental wall motion abnormalities.     1. Chest pressure; history of CAD s/p CABG  -admit to telemetry  -serial TNI negative x 3  -EKG without new ischemic changes  -TTE with newly reduced EF 30-35% with segmental wall motion abnormalities  -continue aspirin, plavix and increase lipitor  -check HbA1c and lipid panel  -NPO after midnight for C on 12/13  -cardio consult appreciated    2. 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 Claudication  -patient with chronically cool/cyanotic feet  -reports having an angiogram recently but unable to recall vascular surgeons name  -pulses present  -continue ASA, plavix and high intensity statin  -obtain outpatient vascular surgery records (patient will find out the name of the physician)    4. DM  -check HbA1c  -hold metformin  -ISS  -ADA diet    5. HTN  -BP uncontrolled  -did not receive PO medications today  -lasix, toprol and losartan as above  -low sodium diet    6. GERD  -continue PPI    7. Glaucoma  -continue timolol and brimonidine     8. HLD  -check lipid panel  -continue statin    9. History of Carotid Stenosis  -continue DAPT and statin     10. Ascending Thoracic Aortic Aneurysm  -TTE with dilated aortic root  -will request cardio to compare it to previous echos for comparison    DVT ppx- SCDs     Dispo - NPO after midnight for [facility] on 12/13. Patient is a 89 year old male with PMH of CAD s/p CABG, s/p PCI, DM, HTN, GERD, OA, Ascending Thoracic Aortic Aneurysm, Carotid Stenosis, HFrEF, Claudication, HLD and Glaucoma who presented to the ED with two day history of chest pressure and found to have a newly reduced EF with segmental wall motion abnormalities.     1. Chest pressure; history of CAD s/p CABG  -admit to telemetry  -serial TNI negative x 3  -EKG without new ischemic changes  -TTE with newly reduced EF 30-35% with segmental wall motion abnormalities  -continue aspirin, plavix and increase lipitor  -check HbA1c and lipid panel  -NPO after midnight for [facility] on 12/13  -cardio consult appreciated    2. 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 Claudication  -patient with chronically cool/cyanotic feet  -reports having an angiogram recently but unable to recall vascular surgeons name  -pulses present  -continue ASA, plavix and high intensity statin  -obtain outpatient vascular surgery records (patient will find out the name of the physician)    4. DM  -check HbA1c  -hold metformin  -ISS  -ADA diet    5. HTN  -BP uncontrolled  -did not receive PO medications today  -lasix, toprol and losartan as above  -low sodium diet    6. GERD  -continue PPI    7. Glaucoma  -continue timolol and brimonidine     8. HLD  -check lipid panel  -continue statin    9. History of Carotid Stenosis  -continue DAPT and statin     10. Ascending Thoracic Aortic Aneurysm  -TTE with dilated aortic root  -will request cardio to compare it to previous echos for comparison    11. Transaminitis likely due to hepatic congestion  -monitor LFTS while on statin    DVT ppx- SCDs     Dispo - NPO after midnight for [facility] on 12/13.

## 2022-12-12 NOTE — CONSULT NOTE ADULT - ASSESSMENT
89 year old with history of HTN, CAD w remote history of CABG, last PCI 7/22 (PATRIC to [facility] for instent restenosis) and 8/22 (mid lAD PATRIC via [facility]). Reports experiencing pressure-like chest discomfort episode at rest.   No sign of ACS based on troponin levels and EKG (unchanged compared to office EKG). Recent cath    Recommendations   OK to DC home from out perspective.   Follow up in 2 weeks.  89 year old with history of HTN, CAD w remote history of CABG, last PCI 7/22 (PATRIC to [facility] for instent restenosis) and 8/22 (mid lAD PATRIC via [facility]). Reports experiencing pressure-like chest discomfort episode at rest.   No sign of ACS based on troponin levels and EKG (unchanged compared to office EKG). Recent cath results as described    Recommendations   Echocardiogram pending  Continue medical therapy for CAD, HTN

## 2022-12-13 ENCOUNTER — TRANSCRIPTION ENCOUNTER (OUTPATIENT)
Age: 87
End: 2022-12-13

## 2022-12-13 LAB
A1C WITH ESTIMATED AVERAGE GLUCOSE RESULT: 7.2 % — HIGH (ref 4–5.6)
ALBUMIN SERPL ELPH-MCNC: 3.8 G/DL — SIGNIFICANT CHANGE UP (ref 3.3–5.2)
ALP SERPL-CCNC: 109 U/L — SIGNIFICANT CHANGE UP (ref 40–120)
ALT FLD-CCNC: 53 U/L — HIGH
ANION GAP SERPL CALC-SCNC: 11 MMOL/L — SIGNIFICANT CHANGE UP (ref 5–17)
AST SERPL-CCNC: 33 U/L — SIGNIFICANT CHANGE UP
BASOPHILS # BLD AUTO: 0.03 K/UL — SIGNIFICANT CHANGE UP (ref 0–0.2)
BASOPHILS NFR BLD AUTO: 0.4 % — SIGNIFICANT CHANGE UP (ref 0–2)
BILIRUB DIRECT SERPL-MCNC: 0.2 MG/DL — SIGNIFICANT CHANGE UP (ref 0–0.3)
BILIRUB INDIRECT FLD-MCNC: 0.5 MG/DL — SIGNIFICANT CHANGE UP (ref 0.2–1)
BILIRUB SERPL-MCNC: 0.7 MG/DL — SIGNIFICANT CHANGE UP (ref 0.4–2)
BUN SERPL-MCNC: 23.6 MG/DL — HIGH (ref 8–20)
CALCIUM SERPL-MCNC: 9.8 MG/DL — SIGNIFICANT CHANGE UP (ref 8.4–10.5)
CHLORIDE SERPL-SCNC: 101 MMOL/L — SIGNIFICANT CHANGE UP (ref 96–108)
CHOLEST SERPL-MCNC: 125 MG/DL — SIGNIFICANT CHANGE UP
CO2 SERPL-SCNC: 28 MMOL/L — SIGNIFICANT CHANGE UP (ref 22–29)
CREAT SERPL-MCNC: 0.97 MG/DL — SIGNIFICANT CHANGE UP (ref 0.5–1.3)
EGFR: 74 ML/MIN/1.73M2 — SIGNIFICANT CHANGE UP
EOSINOPHIL # BLD AUTO: 0.09 K/UL — SIGNIFICANT CHANGE UP (ref 0–0.5)
EOSINOPHIL NFR BLD AUTO: 1.2 % — SIGNIFICANT CHANGE UP (ref 0–6)
ESTIMATED AVERAGE GLUCOSE: 160 MG/DL — HIGH (ref 68–114)
GLUCOSE BLDC GLUCOMTR-MCNC: 139 MG/DL — HIGH (ref 70–99)
GLUCOSE BLDC GLUCOMTR-MCNC: 161 MG/DL — HIGH (ref 70–99)
GLUCOSE BLDC GLUCOMTR-MCNC: 96 MG/DL — SIGNIFICANT CHANGE UP (ref 70–99)
GLUCOSE SERPL-MCNC: 137 MG/DL — HIGH (ref 70–99)
HCT VFR BLD CALC: 42.6 % — SIGNIFICANT CHANGE UP (ref 39–50)
HDLC SERPL-MCNC: 45 MG/DL — SIGNIFICANT CHANGE UP
HGB BLD-MCNC: 13.7 G/DL — SIGNIFICANT CHANGE UP (ref 13–17)
IMM GRANULOCYTES NFR BLD AUTO: 0.3 % — SIGNIFICANT CHANGE UP (ref 0–0.9)
LIPID PNL WITH DIRECT LDL SERPL: 67 MG/DL — SIGNIFICANT CHANGE UP
LYMPHOCYTES # BLD AUTO: 2.45 K/UL — SIGNIFICANT CHANGE UP (ref 1–3.3)
LYMPHOCYTES # BLD AUTO: 33.2 % — SIGNIFICANT CHANGE UP (ref 13–44)
MAGNESIUM SERPL-MCNC: 1.9 MG/DL — SIGNIFICANT CHANGE UP (ref 1.6–2.6)
MCHC RBC-ENTMCNC: 30.1 PG — SIGNIFICANT CHANGE UP (ref 27–34)
MCHC RBC-ENTMCNC: 32.2 GM/DL — SIGNIFICANT CHANGE UP (ref 32–36)
MCV RBC AUTO: 93.6 FL — SIGNIFICANT CHANGE UP (ref 80–100)
MONOCYTES # BLD AUTO: 0.8 K/UL — SIGNIFICANT CHANGE UP (ref 0–0.9)
MONOCYTES NFR BLD AUTO: 10.8 % — SIGNIFICANT CHANGE UP (ref 2–14)
NEUTROPHILS # BLD AUTO: 3.99 K/UL — SIGNIFICANT CHANGE UP (ref 1.8–7.4)
NEUTROPHILS NFR BLD AUTO: 54.1 % — SIGNIFICANT CHANGE UP (ref 43–77)
NON HDL CHOLESTEROL: 80 MG/DL — SIGNIFICANT CHANGE UP
PHOSPHATE SERPL-MCNC: 2.9 MG/DL — SIGNIFICANT CHANGE UP (ref 2.4–4.7)
PLATELET # BLD AUTO: 211 K/UL — SIGNIFICANT CHANGE UP (ref 150–400)
POTASSIUM SERPL-MCNC: 3.8 MMOL/L — SIGNIFICANT CHANGE UP (ref 3.5–5.3)
POTASSIUM SERPL-SCNC: 3.8 MMOL/L — SIGNIFICANT CHANGE UP (ref 3.5–5.3)
PROT SERPL-MCNC: 6.8 G/DL — SIGNIFICANT CHANGE UP (ref 6.6–8.7)
RBC # BLD: 4.55 M/UL — SIGNIFICANT CHANGE UP (ref 4.2–5.8)
RBC # FLD: 14.1 % — SIGNIFICANT CHANGE UP (ref 10.3–14.5)
SODIUM SERPL-SCNC: 140 MMOL/L — SIGNIFICANT CHANGE UP (ref 135–145)
TRIGL SERPL-MCNC: 64 MG/DL — SIGNIFICANT CHANGE UP
TROPONIN T SERPL-MCNC: 0.02 NG/ML — SIGNIFICANT CHANGE UP (ref 0–0.06)
WBC # BLD: 7.38 K/UL — SIGNIFICANT CHANGE UP (ref 3.8–10.5)
WBC # FLD AUTO: 7.38 K/UL — SIGNIFICANT CHANGE UP (ref 3.8–10.5)

## 2022-12-13 PROCEDURE — 99233 SBSQ HOSP IP/OBS HIGH 50: CPT

## 2022-12-13 RX ORDER — SODIUM CHLORIDE 9 MG/ML
250 INJECTION INTRAMUSCULAR; INTRAVENOUS; SUBCUTANEOUS ONCE
Refills: 0 | Status: DISCONTINUED | OUTPATIENT
Start: 2022-12-13 | End: 2022-12-13

## 2022-12-13 RX ORDER — SODIUM CHLORIDE 9 MG/ML
250 INJECTION INTRAMUSCULAR; INTRAVENOUS; SUBCUTANEOUS ONCE
Refills: 0 | Status: COMPLETED | OUTPATIENT
Start: 2022-12-13 | End: 2022-12-13

## 2022-12-13 RX ADMIN — LOSARTAN POTASSIUM 50 MILLIGRAM(S): 100 TABLET, FILM COATED ORAL at 06:39

## 2022-12-13 RX ADMIN — Medication 10 MILLIGRAM(S): at 19:30

## 2022-12-13 RX ADMIN — SODIUM CHLORIDE 62.5 MILLILITER(S): 9 INJECTION INTRAMUSCULAR; INTRAVENOUS; SUBCUTANEOUS at 23:00

## 2022-12-13 RX ADMIN — Medication 2: at 13:17

## 2022-12-13 RX ADMIN — Medication 81 MILLIGRAM(S): at 12:42

## 2022-12-13 RX ADMIN — ATORVASTATIN CALCIUM 40 MILLIGRAM(S): 80 TABLET, FILM COATED ORAL at 23:05

## 2022-12-13 RX ADMIN — BRIMONIDINE TARTRATE 1 DROP(S): 2 SOLUTION/ DROPS OPHTHALMIC at 06:40

## 2022-12-13 RX ADMIN — Medication 40 MILLIGRAM(S): at 06:39

## 2022-12-13 RX ADMIN — CLOPIDOGREL BISULFATE 75 MILLIGRAM(S): 75 TABLET, FILM COATED ORAL at 12:42

## 2022-12-13 RX ADMIN — BRIMONIDINE TARTRATE 1 DROP(S): 2 SOLUTION/ DROPS OPHTHALMIC at 23:06

## 2022-12-13 RX ADMIN — Medication 25 MILLIGRAM(S): at 06:39

## 2022-12-13 NOTE — DISCHARGE NOTE PROVIDER - HOSPITAL COURSE
Patient is a 89 year old male with PMH of CAD s/p CABG, s/p PCI, DM, CAD, HTN, GERD, OA, Ascending Thoracic Aortic Aneurysm Carotid Stenosis, HFrEF, Claudication, HLD and Glaucoma who presented to the ED with two day history of chest pressure.  Patient reports being in his usual state of health until he was outside helping his grandson move some things around when the chest pressure started. States it is midsternal and does not radiate. He reports belching helps alleviate his symptoms, but since they were persistent he decided to come to the ED for further evaluation. Furthermore, patient reports the chest pressure is associated with heavy breathing, but denies associated dizziness, nausea, or diaphoresis. In the ED, patient did not have any new EKG changes and serial TNI were negative. However, a TTE was obtained which revealed a new reduction in his EF and segmental wall motion abnormalities. Patient was seen by cardiology and admission for LHC with interventional team. LHC via RFA/RFV with Dr. Rodarte performed on 12/13. Findings revealed patent stents in the native RCA and the mid LAD after the LIMA anastomosis. Reduced EF with moderate to severe Mitral regurgitation and moderate Aortic Insufficiency. Left main artery showed 80% stenosis.  Proximal left anterior descending artery showed 100 % stenosis. Patient is a 89 year old male with PMH of CAD s/p CABG, s/p PCI, DM, CAD, HTN, GERD, OA, Ascending Thoracic Aortic Aneurysm Carotid Stenosis, HFrEF, Claudication, HLD and Glaucoma who presented to the ED with two day history of chest pressure.  Patient reports being in his usual state of health until he was outside helping his grandson move some things around when the chest pressure started. States it is midsternal and does not radiate. He reports belching helps alleviate his symptoms, but since they were persistent he decided to come to the ED for further evaluation. Furthermore, patient reports the chest pressure is associated with heavy breathing, but denies associated dizziness, nausea, or diaphoresis. In the ED, patient did not have any new EKG changes and serial TNI were negative. However, a TTE was obtained which revealed a new reduction in his EF and segmental wall motion abnormalities. Patient was seen by cardiology and admission for LHC with interventional team. LHC via RFA/RFV with Dr. Rodarte performed on 12/13. Findings revealed patent stents in the native RCA and the mid LAD after the LIMA anastomosis. Reduced EF with moderate to severe Mitral regurgitation and moderate Aortic Insufficiency. Left main artery showed 80% stenosis.  Proximal left anterior descending artery showed 100 % stenosis. Patient post procedure recovery uneventful. Advised to follow up outpatient in 1-2 weeks with Cardiologist Dr. Chino. Patient is medically stable for discharge. Patient is a 89 year old male with PMH of CAD s/p CABG, s/p PCI, DM, CAD, HTN, GERD, OA, Ascending Thoracic Aortic Aneurysm Carotid Stenosis, HFrEF, Claudication, HLD and Glaucoma who presented to the ED with two day history of chest pressure.  Patient reports being in his usual state of health until he was outside helping his grandson move some things around when the chest pressure started. States it is midsternal and does not radiate. He reports belching helps alleviate his symptoms, but since they were persistent he decided to come to the ED for further evaluation. Furthermore, patient reports the chest pressure is associated with heavy breathing, but denies associated dizziness, nausea, or diaphoresis. In the ED, patient did not have any new EKG changes and serial TNI were negative. However, a TTE was obtained which revealed a new reduction in his EF and segmental wall motion abnormalities. Patient was seen by cardiology and admission for LHC with interventional team. LHC via RFA/RFV with Dr. Rodarte performed on 12/13. Findings revealed patent stents in the native RCA and the mid LAD after the LIMA anastomosis. Reduced EF with moderate to severe Mitral regurgitation and moderate Aortic Insufficiency. Left main artery showed 80% stenosis.  Proximal left anterior descending artery showed 100 % stenosis. Patient post procedure recovery uneventful. Cardiac medications adjusted. Advised to follow up outpatient in 1-2 weeks with Cardiologist Dr. Chino. Patient is medically stable for discharge.

## 2022-12-13 NOTE — DISCHARGE NOTE PROVIDER - ATTENDING DISCHARGE PHYSICAL EXAMINATION:
PHYSICAL EXAM:    GENERAL: elderly male, laying in bed, NAD  HEAD:  Atraumatic, Normocephalic  EYES: EOMI, PERRLA, conjunctiva and sclera clear  ENMT: Moist mucous membranes  NECK: Supple   NERVOUS SYSTEM:  Alert & Oriented X3, Motor Strength 5/5 B/L upper and lower extremities   CHEST/LUNG: Clear to auscultation bilaterally; No rales, rhonchi, wheezing, or rubs  HEART: Regular rate and rhythm; + S1/S2  ABDOMEN: Soft, Nontender, Nondistended; Bowel sounds present  EXTREMITIES:  + pedal edema

## 2022-12-13 NOTE — PATIENT PROFILE ADULT - FUNCTIONAL ASSESSMENT - BASIC MOBILITY 6.
3-calculated by average/Not able to assess (calculate score using [facility] averaging method)

## 2022-12-13 NOTE — DISCHARGE NOTE PROVIDER - CARE PROVIDER_API CALL
Krista Chino)  Cardiovascular Disease  [address] [place] [address]  Phone: [phone]  Fax: [phone]  Established Patient  Follow Up Time: 1 week   Krista Chino)  Cardiovascular Disease  [address] [place] [address]  Phone: [phone]  Fax: [phone]  Established Patient  Follow Up Time: 1 week    PCP,   Phone: (   )    -  Fax: (   )    -  Follow Up Time: 1 month

## 2022-12-13 NOTE — DISCHARGE NOTE PROVIDER - PROVIDER TOKENS
PROVIDER:[TOKEN:[[id number]],FOLLOWUP:[1 week],ESTABLISHEDPATIENT:[T]] PROVIDER:[TOKEN:[[id number]],FOLLOWUP:[1 week],ESTABLISHEDPATIENT:[T]],FREE:[LAST:[PCP],PHONE:[(   )    -],FAX:[(   )    -],FOLLOWUP:[1 month]]

## 2022-12-13 NOTE — CONSULT NOTE ADULT - SUBJECTIVE AND OBJECTIVE BOX
Patient is a 89y old  Male who presents with a chief complaint of chest pain (13 Dec 2022 19:40)      HPI:  Patient is a 89 year old male with PMH of CAD s/p CABG, s/p PCI, DM, CAD, HTN, GERD, OA, Ascending Thoracic Aortic Aneurysm Carotid Stenosis, HFrEF, Claudication, HLD and Glaucoma who presented to the ED with two day history of chest pressure.  Patient reports being in his usual state of health until he was outside helping his grandson move some things around when the chest pressure started. States it is midsternal and does not radiate. He reports belching helps alleviate his symptoms, but since they were persistent he decided to come to the ED for further evaluation. Furthermore, patient reports the chest pressure is associated with heavy breathing, but denies associated dizziness, nausea, or diaphoresis. In the ED, patient did not have any new EKG changes and serial TNI were negative. However, a TTE was obtained which revealed a new reduction in his EF and segmental wall motion abnormalities.      PAST MEDICAL & SURGICAL HISTORY:  High cholesterol  Diabetes  MI, old  HTN (hypertension)  GERD (gastroesophageal reflux disease)  Arthritis  S/P CABG (coronary artery bypass graft)  History of coronary artery stent placement          PREVIOUS DIAGNOSTIC TESTING:      ECHO  FINDINGS: < from: TTE Echo Complete w/o Contrast w/ Doppler (12.12.22 @ 12:36) >  Summary:   1. Mildly enlarged left atrium.   2. Segmental wall motion abnormalities in LCx and RCA territory.   3. Left ventricular ejection fraction, by visual estimation, is 30 to   35%.   4. Moderately enlarged right atrium.   5. Moderately enlarged right ventricle. Moderately reduced RVsystolic   function.   6. Moderate mitral valve regurgitation.   7. Moderate aortic regurgitation.   8. Estimated pulmonary artery systolic pressure is 69.0 mmHg -severe   pulmonary hypertension.   9. Dilatation of the aortic root.  10. There is no evidence of pericardial effusion.  11. ER team informed of the findings.    MD Marcelo, RPVI Electronically signed on 12/12/2022 at 3:35:42   PM        Allergies  No Known Allergies        MEDICATIONS  (STANDING):  aspirin  chewable 81 milliGRAM(s) Oral daily  atorvastatin 40 milliGRAM(s) Oral at bedtime  brimonidine 0.2% Ophthalmic Solution 1 Drop(s) Both EYES two times a day  clopidogrel Tablet 75 milliGRAM(s) Oral daily  dextrose 5%. 1000 milliLiter(s) (50 mL/Hr) IV Continuous <Continuous>  dextrose 5%. 1000 milliLiter(s) (100 mL/Hr) IV Continuous <Continuous>  dextrose 50% Injectable 25 Gram(s) IV Push once  dextrose 50% Injectable 12.5 Gram(s) IV Push once  dextrose 50% Injectable 25 Gram(s) IV Push once  furosemide    Tablet 40 milliGRAM(s) Oral daily  glucagon  Injectable 1 milliGRAM(s) IntraMuscular once  insulin lispro (ADMELOG) corrective regimen sliding scale   SubCutaneous three times a day before meals  losartan 50 milliGRAM(s) Oral daily  metoprolol succinate ER 25 milliGRAM(s) Oral daily  pantoprazole    Tablet 40 milliGRAM(s) Oral before breakfast  sodium chloride 0.9% Bolus 250 milliLiter(s) IV Bolus once  timolol 0.5% Solution 1 Drop(s) Both EYES daily    MEDICATIONS  (PRN):  dextrose Oral Gel 15 Gram(s) Oral once PRN Blood Glucose LESS THAN 70 milliGRAM(s)/deciliter  hydrALAZINE Injectable 10 milliGRAM(s) IV Push every 6 hours PRN SBP > 170      FAMILY HISTORY:  FH: hypertension    CIGARETTES:  Denies    ALCOHOL:  Denies         REVIEW OF SYSTEMS:  CONSTITUTIONAL: No fever, weight loss, or fatigue  EYES: No eye pain, visual disturbances, or discharge  ENMT:  No difficulty hearing, tinnitus, vertigo; No sinus or throat pain  NECK: No pain or stiffness  RESPIRATORY: No cough, wheezing, chills or hemoptysis; No Shortness of Breath  CARDIOVASCULAR: + chest pain, palpitations, passing out, dizziness, or leg swelling  GASTROINTESTINAL: No abdominal or epigastric pain. No nausea, vomiting, or hematemesis; No diarrhea or constipation. No melena or hematochezia.  GENITOURINARY: No dysuria, frequency, hematuria, or incontinence  NEUROLOGICAL: No headaches, memory loss, loss of strength, numbness, or tremors  SKIN: No itching, burning, rashes, or lesions   ENDOCRINE: No heat or cold intolerance; No hair loss  MUSCULOSKELETAL: No joint pain or swelling; No muscle, back, or extremity pain  PSYCHIATRIC: No depression, anxiety, mood swings, or difficulty sleeping  HEME/LYMPH: No easy bruising, or bleeding gums  ALLERY AND IMMUNOLOGIC: No hives or eczema	    Vital Signs Last 24 Hrs  T(C): 36.8 (13 Dec 2022 14:29), Max: 37 (13 Dec 2022 08:33)  T(F): 98.2 (13 Dec 2022 14:29), Max: 98.6 (13 Dec 2022 08:33)  HR: 69 (13 Dec 2022 20:40) (64 - 82)  BP: 141/56 (13 Dec 2022 20:40) (135/63 - 195/82)  BP(mean): --  RR: 15 (13 Dec 2022 20:40) (15 - 18)  SpO2: 98% (13 Dec 2022 20:40) (96% - 98%)    Parameters below as of 13 Dec 2022 20:40  Patient On (Oxygen Delivery Method): room air          PHYSICAL EXAM:  Appearance: Normal, well nourished	  HEENT:   Normal oral mucosa, PERRL, EOMI, sclera non-icteric	  Cardiovascular: Normal S1 S2, No JVD, No cardiac murmurs, No carotid bruits, No peripheral edema  Respiratory: Lungs clear to auscultation	  Psychiatry: A & O x 3, Mood & affect appropriate  Gastrointestinal:  Soft, Non-tender, + BS, no bruits	  Skin: No rashes, No ecchymoses, No cyanosis  Neurologic: Grossly non-focal with full strength in all four extremities  Extremities: Normal range of motion, No clubbing, cyanosis or edema  Vascular: Peripheral pulses palpable bilaterally        ECG:  SR, Nonspecific T wave abnormalities         LABS:                        13.7   7.38  )-----------( 211      ( 13 Dec 2022 02:30 )             42.6     12-13    140  |  101  |  23.6<H>  ----------------------------<  137<H>  3.8   |  28.0  |  0.97    Ca    9.8      13 Dec 2022 02:30  Phos  2.9     12-13  Mg     1.9     12-13    TPro  6.8  /  Alb  3.8  /  TBili  0.7  /  DBili  0.2  /  AST  33  /  ALT  53<H>  /  AlkPhos  109  12-13    CARDIAC MARKERS ( 13 Dec 2022 06:50 )  x     / 0.02 ng/mL / x     / x     / x      CARDIAC MARKERS ( 12 Dec 2022 12:00 )  x     / <0.01 ng/mL / x     / x     / x      CARDIAC MARKERS ( 12 Dec 2022 08:25 )  x     / <0.01 ng/mL / x     / x     / x      CARDIAC MARKERS ( 12 Dec 2022 04:41 )  x     / <0.01 ng/mL / x     / x     / x              Assessment:  HPI:  Patient is a 89 year old male with PMH of CAD s/p CABG, s/p PCI, DM, CAD, HTN, GERD, OA, Ascending Thoracic Aortic Aneurysm Carotid Stenosis, HFrEF, Claudication, HLD and Glaucoma who presented to the ED with two day history of chest pressure.  Patient reports being in his usual state of health until he was outside helping his grandson move some things around when the chest pressure started. States it is midsternal and does not radiate. He reports belching helps alleviate his symptoms, but since they were persistent he decided to come to the ED for further evaluation. Furthermore, patient reports the chest pressure is associated with heavy breathing, but denies associated dizziness, nausea, or diaphoresis. In the ED, patient did not have any new EKG changes and serial TNI were negative. However, a TTE was obtained which revealed a new reduction in his EF and segmental wall motion abnormalities.  Due to Pt's significant cardiac history and current symptoms I am recommending Right and Left heart Cardiac Cath for further cardiac evaluation.    Plan:  Right and Left heart cardiac catheterization and possible percutaneous intervention recommended.  Risks, benefits, and alternatives reviewed.  Risks including but not limited to MI, death, stroke, bleeding, infection, vessel injury, hematoma, renal failure, allergic reaction, urgent open heart surgery, restenosis and stent thrombosis were reviewed.  All questions answered.  Patient is agreeable to proceed.       
Patient is a 89y old  Male who presents with a chief complaint of chest pain    HPI:  89 year old with history of HTN, CAD w remote history of CABG, last PCI 7/22 (PATRIC to RCA for instent restenosis) and 8/22 (mid lAD PATRIC via LIMA). Reports experiencing pressure-like chest discomfort episode at rest.     PAST MEDICAL & SURGICAL HISTORY:  High cholesterol      Diabetes      MI, old      HTN (hypertension)      GERD (gastroesophageal reflux disease)      Arthritis      S/P CABG (coronary artery bypass graft)      History of coronary artery stent placement                Allergies    No Known Allergies    Intolerances        MEDICATIONS  (STANDING):  aspirin  chewable 81 milliGRAM(s) Oral daily  atorvastatin 20 milliGRAM(s) Oral at bedtime  clopidogrel Tablet 75 milliGRAM(s) Oral daily  dextrose 5%. 1000 milliLiter(s) (50 mL/Hr) IV Continuous <Continuous>  dextrose 5%. 1000 milliLiter(s) (100 mL/Hr) IV Continuous <Continuous>  dextrose 50% Injectable 25 Gram(s) IV Push once  dextrose 50% Injectable 12.5 Gram(s) IV Push once  dextrose 50% Injectable 25 Gram(s) IV Push once  furosemide    Tablet 40 milliGRAM(s) Oral daily  glucagon  Injectable 1 milliGRAM(s) IntraMuscular once  insulin lispro (ADMELOG) corrective regimen sliding scale   SubCutaneous three times a day before meals  losartan 50 milliGRAM(s) Oral daily    MEDICATIONS  (PRN):  dextrose Oral Gel 15 Gram(s) Oral once PRN Blood Glucose LESS THAN 70 milliGRAM(s)/deciliter      FAMILY HISTORY:  FH: hypertension        SOCIAL HISTORY:    CIGARETTES: denies    ALCOHOL: denies    REVIEW OF SYSTEMS:  CONSTITUTIONAL: No fever, weight loss, or fatigue  EYES: No eye pain, visual disturbances, or discharge  ENMT:  No difficulty hearing, tinnitus, vertigo; No sinus or throat pain  NECK: No pain or stiffness  RESPIRATORY: No cough, wheezing, chills or hemoptysis; No Shortness of Breath  CARDIOVASCULAR: as in HPI  GASTROINTESTINAL: No abdominal or epigastric pain. No nausea, vomiting, or hematemesis; No diarrhea or constipation. No melena or hematochezia.  GENITOURINARY: No dysuria, frequency, hematuria, or incontinence  NEUROLOGICAL: No headaches, memory loss, loss of strength, numbness, or tremors  SKIN: No itching, burning, rashes, or lesions   LYMPH Nodes: No enlarged glands  ENDOCRINE: No heat or cold intolerance; No hair loss  MUSCULOSKELETAL: + joint pains  PSYCHIATRIC: No depression, anxiety, mood swings, or difficulty sleeping  HEME/LYMPH: No easy bruising, or bleeding gums  ALLERY AND IMMUNOLOGIC: No hives or eczema	    Vital Signs Last 24 Hrs  T(C): 36.3 (12 Dec 2022 11:21), Max: 36.8 (12 Dec 2022 06:12)  T(F): 97.3 (12 Dec 2022 11:21), Max: 98.2 (12 Dec 2022 06:12)  HR: 72 (12 Dec 2022 11:21) (68 - 76)  BP: 153/80 (12 Dec 2022 11:21) (139/69 - 187/74)  RR: 18 (12 Dec 2022 11:21) (17 - 18)  SpO2: 97% (12 Dec 2022 11:21) (97% - 99%)    Parameters below as of 12 Dec 2022 11:21  Patient On (Oxygen Delivery Method): room air        Daily Height in cm: 170.18 (12 Dec 2022 07:46)    Daily     I&O's Detail      PHYSICAL EXAM:  Appearance: Normal, well nourished	  HEENT:   Normal oral mucosa, PERRL, EOMI, sclera non-icteric	  Lymphatic: No cervical lymphadenopathy  Cardiovascular: Normal S1 S2, No JVD, No cardiac murmurs, No carotid bruits, No peripheral edema  Respiratory: Lungs clear to auscultation	  Psychiatry: A & O x 3, Mood & affect appropriate  Gastrointestinal:  Soft, Non-tender, + BS, no bruits	  Skin: No rashes, No ecchymoses, No cyanosis  Neurologic: Grossly non-focal with full strength in all four extremities  Extremities: Normal range of motion, No clubbing, cyanosis or edema  Vascular: Peripheral pulses palpable 2+ bilaterally      INTERPRETATION OF TELEMETRY:    ECG: SR, Nonspecific T wave abnormalities     LABS:                        13.3   8.00  )-----------( 191      ( 12 Dec 2022 04:41 )             41.0     12-12    140  |  104  |  29.9<H>  ----------------------------<  124<H>  4.2   |  25.0  |  0.97    Ca    9.1      12 Dec 2022 04:41  Mg     1.9     12-12    TPro  6.8  /  Alb  3.7  /  TBili  0.5  /  DBili  x   /  AST  77<H>  /  ALT  78<H>  /  AlkPhos  124<H>  12-12    CARDIAC MARKERS ( 12 Dec 2022 08:25 )  x     / <0.01 ng/mL / x     / x     / x      CARDIAC MARKERS ( 12 Dec 2022 04:41 )  x     / <0.01 ng/mL / x     / x     / x              I&O's Summary    BNPSerum Pro-Brain Natriuretic Peptide: 2863 pg/mL (12-12 @ 04:41)    RADIOLOGY & ADDITIONAL STUDIES:

## 2022-12-13 NOTE — DISCHARGE NOTE PROVIDER - NSDCCPTREATMENT_GEN_ALL_CORE_FT
Adequate PRINCIPAL PROCEDURE  Procedure: Complete cardiac catheterization  Findings and Treatment: -You had a cardiac catheterization with Dr. Rodarte  on 12/13/22. Dr Rodarte accessed your right femoral artery and your right femoral vein during the procedure. That is the artery and vein in your right groin  -Please continue to monitor your right groin when you go home. If you develop any bleeding, lay down where you are and apply direct firm pressure until the bleeding stops. If the bleeding is excessive, please call 911.   -If heavy bleeding or large lumps form, hold pressure at the spot and come to the Emergency Room.  -No submerging the leg in water for 48 hours. This includes hot tubs, swimming pools and jacuzzis.  You may start showering on 12/14. Prior to showering, remove dressing from right groin. Shower with warm water and soap. Pat dry with towel. You may place a bandaid over the site. change the bandaid every day.   -You may walk indoors/ outdoors as tolerated. No strenuous exercise, gym, sports or heavy lifting x5 days. Please avoid stair climbing x 48 hours.   -Please return to nearest ED if you develop any fever, chills, drainage from the incision site, or any change of temperature, color, sensation of the affected extremity.  -Call your doctor for any bleeding, swelling, loss of sensation in the right leg or foot, or if the toes start turning blue.   -Please return to nearest ED if you develop any chest pain, chest pressure, shortness of breath, jaw pain, pain radiating down the arm, palpitations, dizziness, palpitations, abdominal complaints including abdominal pain, nausea and vomiting.   -Please follow up with your cardiologist Dr. Krista Chino in 1-2 weeks

## 2022-12-13 NOTE — PATIENT PROFILE ADULT - FALL HARM RISK - HARM RISK INTERVENTIONS

## 2022-12-13 NOTE — DISCHARGE NOTE PROVIDER - NSDCMRMEDTOKEN_GEN_ALL_CORE_FT
aspirin 81 mg oral tablet: 1 tab(s) orally once a day  atorvastatin: 1 tab(s) orally once a day (in the evening)  brimonidine 0.2% ophthalmic solution: 1 drop(s) to each affected eye 2 times a day  furosemide 40 mg oral tablet: 1 tab(s) orally once a day  losartan 50 mg oral tablet: 1 tab(s) orally once a day  metFORMIN 500 mg oral tablet: 1 tab(s) orally 2 times a day  Hold X 2 days post procedure. Restart on 9/2/22  Plavix 75 mg oral tablet: 1 tab(s) orally once a day  Protonix 40 mg oral delayed release tablet: 1 tab(s) orally once a day  Timolol Maleate, long-acting 0.5% ophthalmic solution: 1 drop(s) to each affected eye once a day (in the morning)   aspirin 81 mg oral tablet: 1 tab(s) orally once a day  atorvastatin: 1 tab(s) orally once a day (in the evening)  brimonidine 0.2% ophthalmic solution: 1 drop(s) to each affected eye 2 times a day  Entresto 24 mg-26 mg oral tablet: 1 tab(s) orally 2 times a day   furosemide 40 mg oral tablet: 1 tab(s) orally once a day  metFORMIN 500 mg oral tablet: 1 tab(s) orally 2 times a day  Hold X 2 days post procedure. Restart on 12/15/22  metoprolol succinate 25 mg oral tablet, extended release: 1 tab(s) orally once a day  Plavix 75 mg oral tablet: 1 tab(s) orally once a day  Protonix 40 mg oral delayed release tablet: 1 tab(s) orally once a day  Timolol Maleate, long-acting 0.5% ophthalmic solution: 1 drop(s) to each affected eye once a day (in the morning)

## 2022-12-13 NOTE — DISCHARGE NOTE PROVIDER - NSDCCPCAREPLAN_GEN_ALL_CORE_FT
PRINCIPAL DISCHARGE DIAGNOSIS  Diagnosis: Chest pain with high risk of acute coronary syndrome  Assessment and Plan of Treatment:       SECONDARY DISCHARGE DIAGNOSES  Diagnosis: CAD (coronary artery disease)  Assessment and Plan of Treatment: Coronary artery disease is the buildup of plaque in the arteries that supply oxygen-rich blood to your heart. Plaque causes a narrowing or blockage that could result in a heart attack. Symptoms include chest pain or discomfort, shortness of breath, dizziness, palpitations, fatigue or reduced exercise tolerance. .  Go to the ED with any acute onset of chest pain, palpitations, shortness of breath or dizziness. Do NOT miss a dose or stop taking your Aspirin and Plavix,  these medications keep your stent open and prevent a heart attack. If anyone tells you to stop these medications, speak to your cardiologist immediately.  Managing risk factors will help keep your stent open and prevent future blockages, risk factors may include: high blood pressure, high cholesterol, diabetes, obesity, sedentary life style and smoking.    Your diet should be low in fat, cholesterol, salt and carbohydrates, increase fruits (caution if diabetic), vegetables and whole grains/fiber rich foods.   Take all your cardiac  medications as prescribed.    Exercise is a very important factor in heart health. Once your post procedure restrictions have passed, you should engage in heart healthy, aerobic exercise. Be sure to have clearance from your cardiologist. Cardiac rehab programs could be extremely beneficial and your cardiologist could help set this up.   Follow up with your cardiologist Dr. shankar within 1-2 weeks after your procedure.   Call your cardiologist or our unit ([phone]) with any questions or concerns that may arise.     PRINCIPAL DISCHARGE DIAGNOSIS  Diagnosis: Chest pain with high risk of acute coronary syndrome  Assessment and Plan of Treatment: Serial troponins negative. EKG without new ischemic changes. Echo reveals newly reduced EF 30-35% with segmental wall motion abnormalities. Continue Aspirin, Plavix and Lipitor. [facility] performed with interventional cardiology. No acute intervention warranted. PLease follow up outpatient with Dr. Chino.        SECONDARY DISCHARGE DIAGNOSES  Diagnosis: CAD (coronary artery disease)  Assessment and Plan of Treatment: Coronary artery disease is the buildup of plaque in the arteries that supply oxygen-rich blood to your heart. Plaque causes a narrowing or blockage that could result in a heart attack. Symptoms include chest pain or discomfort, shortness of breath, dizziness, palpitations, fatigue or reduced exercise tolerance. .  Go to the ED with any acute onset of chest pain, palpitations, shortness of breath or dizziness. Do NOT miss a dose or stop taking your Aspirin and Plavix,  these medications keep your stent open and prevent a heart attack. If anyone tells you to stop these medications, speak to your cardiologist immediately.  Managing risk factors will help keep your stent open and prevent future blockages, risk factors may include: high blood pressure, high cholesterol, diabetes, obesity, sedentary life style and smoking.    Your diet should be low in fat, cholesterol, salt and carbohydrates, increase fruits (caution if diabetic), vegetables and whole grains/fiber rich foods.   Take all your cardiac  medications as prescribed.    Exercise is a very important factor in heart health. Once your post procedure restrictions have passed, you should engage in heart healthy, aerobic exercise. Be sure to have clearance from your cardiologist. Cardiac rehab programs could be extremely beneficial and your cardiologist could help set this up.   Follow up with your cardiologist Dr. chino within 1-2 weeks after your procedure.   Call your cardiologist or our unit ([phone]) with any questions or concerns that may arise.    Diagnosis: Acute on chronic systolic congestive heart failure  Assessment and Plan of Treatment: BNP 2800 with pedal edema but clear lung. IV Lasix given with good reponse. Continue Lasix, Losartan and Toprol XL. Echo showed severe MR and AR.    Diagnosis: Claudication  Assessment and Plan of Treatment: Patient with chronically cool/cyanotic feet. Reports having an angiogram recently but unable to recall vascular surgeons name. Pulses present via doppler. Continue ASA, plavix and high intensity statin. Follow up with vascular surgery as outpatient.    Diagnosis: DM (diabetes mellitus)  Assessment and Plan of Treatment: HbA1c 7.2. Continue antidiabetic regimen. Adhere to ADA diet    Diagnosis: HTN (hypertension)  Assessment and Plan of Treatment: BP controlled. Continue Lasix, Toprol and Losartan. Adhere to a low sodium diet.    Diagnosis: GERD (gastroesophageal reflux disease)  Assessment and Plan of Treatment: Continue PPI.    Diagnosis: Glaucoma  Assessment and Plan of Treatment: Continue Timolol and Brimonidine.    Diagnosis: HLD (hyperlipidemia)  Assessment and Plan of Treatment: Continue statin.    Diagnosis: H/O carotid stenosis  Assessment and Plan of Treatment: Continue DAPT and statin.    Diagnosis: Thoracic ascending aortic aneurysm  Assessment and Plan of Treatment: Echo revealed dilated aortic root. No emergent intervention warranted. Follow up outpatient for surveillance.    Diagnosis: Transaminitis  Assessment and Plan of Treatment: Follow up routinely with outpatient PCP to monitor liver function while on statin.     PRINCIPAL DISCHARGE DIAGNOSIS  Diagnosis: Chest pain with high risk of acute coronary syndrome  Assessment and Plan of Treatment: Serial troponins negative. EKG without new ischemic changes. Echo reveals newly reduced EF 30-35% with segmental wall motion abnormalities. Continue Aspirin, Plavix and Lipitor. [facility] performed with interventional cardiology. No acute intervention warranted. PLease follow up outpatient with Dr. Chino. Take cardiac medications as prescribed.        SECONDARY DISCHARGE DIAGNOSES  Diagnosis: CAD (coronary artery disease)  Assessment and Plan of Treatment: Coronary artery disease is the buildup of plaque in the arteries that supply oxygen-rich blood to your heart. Plaque causes a narrowing or blockage that could result in a heart attack. Symptoms include chest pain or discomfort, shortness of breath, dizziness, palpitations, fatigue or reduced exercise tolerance. .  Go to the ED with any acute onset of chest pain, palpitations, shortness of breath or dizziness. Do NOT miss a dose or stop taking your Aspirin and Plavix,  these medications keep your stent open and prevent a heart attack. If anyone tells you to stop these medications, speak to your cardiologist immediately.  Managing risk factors will help keep your stent open and prevent future blockages, risk factors may include: high blood pressure, high cholesterol, diabetes, obesity, sedentary life style and smoking.    Your diet should be low in fat, cholesterol, salt and carbohydrates, increase fruits (caution if diabetic), vegetables and whole grains/fiber rich foods.   Take all your cardiac  medications as prescribed.    Exercise is a very important factor in heart health. Once your post procedure restrictions have passed, you should engage in heart healthy, aerobic exercise. Be sure to have clearance from your cardiologist. Cardiac rehab programs could be extremely beneficial and your cardiologist could help set this up.   Follow up with your cardiologist Dr. chino within 1-2 weeks after your procedure.   Call your cardiologist or our unit ([phone]) with any questions or concerns that may arise.    Diagnosis: Acute on chronic systolic congestive heart failure  Assessment and Plan of Treatment: BNP 2800 with pedal edema but clear lung. IV Lasix given with good reponse. Continue Lasix, Entresto and Toprol XL. Echo showed severe MR and AR.    Diagnosis: Claudication  Assessment and Plan of Treatment: Patient with chronically cool/cyanotic feet. Reports having an angiogram recently but unable to recall vascular surgeons name. Pulses present via doppler. Continue ASA, plavix and high intensity statin. Follow up with vascular surgery as outpatient.    Diagnosis: DM (diabetes mellitus)  Assessment and Plan of Treatment: HbA1c 7.2. Continue antidiabetic regimen on 12/15. HOLD METFORMIN UNTIL then. Adhere to ADA diet    Diagnosis: HTN (hypertension)  Assessment and Plan of Treatment: BP controlled. Continue Lasix, Toprol and Losartan. Adhere to a low sodium diet.    Diagnosis: GERD (gastroesophageal reflux disease)  Assessment and Plan of Treatment: Continue PPI.    Diagnosis: Glaucoma  Assessment and Plan of Treatment: Continue Timolol and Brimonidine.    Diagnosis: HLD (hyperlipidemia)  Assessment and Plan of Treatment: Continue statin.    Diagnosis: H/O carotid stenosis  Assessment and Plan of Treatment: Continue DAPT and statin.    Diagnosis: Thoracic ascending aortic aneurysm  Assessment and Plan of Treatment: Echo revealed dilated aortic root. No emergent intervention warranted. Follow up outpatient for surveillance.    Diagnosis: Transaminitis  Assessment and Plan of Treatment: Follow up routinely with outpatient PCP to monitor liver function while on statin.

## 2022-12-14 ENCOUNTER — TRANSCRIPTION ENCOUNTER (OUTPATIENT)
Age: 87
End: 2022-12-14

## 2022-12-14 VITALS
HEART RATE: 68 BPM | DIASTOLIC BLOOD PRESSURE: 61 MMHG | SYSTOLIC BLOOD PRESSURE: 147 MMHG | RESPIRATION RATE: 18 BRPM | OXYGEN SATURATION: 97 % | TEMPERATURE: 98 F

## 2022-12-14 LAB
ANION GAP SERPL CALC-SCNC: 14 MMOL/L — SIGNIFICANT CHANGE UP (ref 5–17)
BASOPHILS # BLD AUTO: 0.03 K/UL — SIGNIFICANT CHANGE UP (ref 0–0.2)
BASOPHILS NFR BLD AUTO: 0.3 % — SIGNIFICANT CHANGE UP (ref 0–2)
BUN SERPL-MCNC: 28.9 MG/DL — HIGH (ref 8–20)
CALCIUM SERPL-MCNC: 9.8 MG/DL — SIGNIFICANT CHANGE UP (ref 8.4–10.5)
CHLORIDE SERPL-SCNC: 99 MMOL/L — SIGNIFICANT CHANGE UP (ref 96–108)
CO2 SERPL-SCNC: 27 MMOL/L — SIGNIFICANT CHANGE UP (ref 22–29)
CREAT SERPL-MCNC: 1.05 MG/DL — SIGNIFICANT CHANGE UP (ref 0.5–1.3)
EGFR: 67 ML/MIN/1.73M2 — SIGNIFICANT CHANGE UP
EOSINOPHIL # BLD AUTO: 0.06 K/UL — SIGNIFICANT CHANGE UP (ref 0–0.5)
EOSINOPHIL NFR BLD AUTO: 0.6 % — SIGNIFICANT CHANGE UP (ref 0–6)
GLUCOSE BLDC GLUCOMTR-MCNC: 129 MG/DL — HIGH (ref 70–99)
GLUCOSE BLDC GLUCOMTR-MCNC: 168 MG/DL — HIGH (ref 70–99)
GLUCOSE SERPL-MCNC: 121 MG/DL — HIGH (ref 70–99)
HCT VFR BLD CALC: 48.1 % — SIGNIFICANT CHANGE UP (ref 39–50)
HGB BLD-MCNC: 15.5 G/DL — SIGNIFICANT CHANGE UP (ref 13–17)
IMM GRANULOCYTES NFR BLD AUTO: 0.4 % — SIGNIFICANT CHANGE UP (ref 0–0.9)
LYMPHOCYTES # BLD AUTO: 3.54 K/UL — HIGH (ref 1–3.3)
LYMPHOCYTES # BLD AUTO: 33.3 % — SIGNIFICANT CHANGE UP (ref 13–44)
MAGNESIUM SERPL-MCNC: 1.8 MG/DL — SIGNIFICANT CHANGE UP (ref 1.6–2.6)
MCHC RBC-ENTMCNC: 29.9 PG — SIGNIFICANT CHANGE UP (ref 27–34)
MCHC RBC-ENTMCNC: 32.2 GM/DL — SIGNIFICANT CHANGE UP (ref 32–36)
MCV RBC AUTO: 92.9 FL — SIGNIFICANT CHANGE UP (ref 80–100)
MONOCYTES # BLD AUTO: 1.06 K/UL — HIGH (ref 0–0.9)
MONOCYTES NFR BLD AUTO: 10 % — SIGNIFICANT CHANGE UP (ref 2–14)
NEUTROPHILS # BLD AUTO: 5.9 K/UL — SIGNIFICANT CHANGE UP (ref 1.8–7.4)
NEUTROPHILS NFR BLD AUTO: 55.4 % — SIGNIFICANT CHANGE UP (ref 43–77)
PHOSPHATE SERPL-MCNC: 3.6 MG/DL — SIGNIFICANT CHANGE UP (ref 2.4–4.7)
PLATELET # BLD AUTO: 252 K/UL — SIGNIFICANT CHANGE UP (ref 150–400)
POTASSIUM SERPL-MCNC: 3.8 MMOL/L — SIGNIFICANT CHANGE UP (ref 3.5–5.3)
POTASSIUM SERPL-SCNC: 3.8 MMOL/L — SIGNIFICANT CHANGE UP (ref 3.5–5.3)
RBC # BLD: 5.18 M/UL — SIGNIFICANT CHANGE UP (ref 4.2–5.8)
RBC # FLD: 13.9 % — SIGNIFICANT CHANGE UP (ref 10.3–14.5)
SODIUM SERPL-SCNC: 140 MMOL/L — SIGNIFICANT CHANGE UP (ref 135–145)
WBC # BLD: 10.63 K/UL — HIGH (ref 3.8–10.5)
WBC # FLD AUTO: 10.63 K/UL — HIGH (ref 3.8–10.5)

## 2022-12-14 PROCEDURE — 99239 HOSP IP/OBS DSCHRG MGMT >30: CPT

## 2022-12-14 PROCEDURE — 80076 HEPATIC FUNCTION PANEL: CPT

## 2022-12-14 PROCEDURE — 83735 ASSAY OF MAGNESIUM: CPT

## 2022-12-14 PROCEDURE — 93005 ELECTROCARDIOGRAM TRACING: CPT

## 2022-12-14 PROCEDURE — 93460 R&L HRT ART/VENTRICLE ANGIO: CPT

## 2022-12-14 PROCEDURE — 71045 X-RAY EXAM CHEST 1 VIEW: CPT

## 2022-12-14 PROCEDURE — 93306 TTE W/DOPPLER COMPLETE: CPT

## 2022-12-14 PROCEDURE — C1769: CPT

## 2022-12-14 PROCEDURE — G0378: CPT

## 2022-12-14 PROCEDURE — 80053 COMPREHEN METABOLIC PANEL: CPT

## 2022-12-14 PROCEDURE — 84484 ASSAY OF TROPONIN QUANT: CPT

## 2022-12-14 PROCEDURE — 80048 BASIC METABOLIC PNL TOTAL CA: CPT

## 2022-12-14 PROCEDURE — 87637 SARSCOV2&INF A&B&RSV AMP PRB: CPT

## 2022-12-14 PROCEDURE — 84100 ASSAY OF PHOSPHORUS: CPT

## 2022-12-14 PROCEDURE — C1889: CPT

## 2022-12-14 PROCEDURE — 85025 COMPLETE CBC W/AUTO DIFF WBC: CPT

## 2022-12-14 PROCEDURE — 93567 NJX CAR CTH SPRVLV AORTGRPHY: CPT

## 2022-12-14 PROCEDURE — C1894: CPT

## 2022-12-14 PROCEDURE — 80061 LIPID PANEL: CPT

## 2022-12-14 PROCEDURE — 82962 GLUCOSE BLOOD TEST: CPT

## 2022-12-14 PROCEDURE — 83036 HEMOGLOBIN GLYCOSYLATED A1C: CPT

## 2022-12-14 PROCEDURE — 36415 COLL VENOUS BLD VENIPUNCTURE: CPT

## 2022-12-14 PROCEDURE — 99285 EMERGENCY DEPT VISIT HI MDM: CPT | Mod: 25

## 2022-12-14 PROCEDURE — 83880 ASSAY OF NATRIURETIC PEPTIDE: CPT

## 2022-12-14 PROCEDURE — C1887: CPT

## 2022-12-14 RX ORDER — METOPROLOL TARTRATE 50 MG
1 TABLET ORAL
Qty: 30 | Refills: 0
Start: 2022-12-14 | End: 2023-01-12

## 2022-12-14 RX ORDER — METFORMIN HYDROCHLORIDE 850 MG/1
1 TABLET ORAL
Qty: 0 | Refills: 0 | DISCHARGE

## 2022-12-14 RX ORDER — SACUBITRIL AND VALSARTAN 24; 26 MG/1; MG/1
1 TABLET, FILM COATED ORAL
Qty: 60 | Refills: 0
Start: 2022-12-14 | End: 2023-01-12

## 2022-12-14 RX ORDER — MAGNESIUM SULFATE 500 MG/ML
1 VIAL (ML) INJECTION ONCE
Refills: 0 | Status: COMPLETED | OUTPATIENT
Start: 2022-12-14 | End: 2022-12-14

## 2022-12-14 RX ORDER — POTASSIUM CHLORIDE 20 MEQ
40 PACKET (EA) ORAL ONCE
Refills: 0 | Status: COMPLETED | OUTPATIENT
Start: 2022-12-14 | End: 2022-12-14

## 2022-12-14 RX ADMIN — BRIMONIDINE TARTRATE 1 DROP(S): 2 SOLUTION/ DROPS OPHTHALMIC at 05:18

## 2022-12-14 RX ADMIN — Medication 40 MILLIEQUIVALENT(S): at 09:25

## 2022-12-14 RX ADMIN — Medication 81 MILLIGRAM(S): at 12:07

## 2022-12-14 RX ADMIN — PANTOPRAZOLE SODIUM 40 MILLIGRAM(S): 20 TABLET, DELAYED RELEASE ORAL at 05:17

## 2022-12-14 RX ADMIN — Medication 1 DROP(S): at 08:16

## 2022-12-14 RX ADMIN — CLOPIDOGREL BISULFATE 75 MILLIGRAM(S): 75 TABLET, FILM COATED ORAL at 12:06

## 2022-12-14 RX ADMIN — Medication 40 MILLIGRAM(S): at 05:16

## 2022-12-14 RX ADMIN — Medication 100 GRAM(S): at 09:30

## 2022-12-14 RX ADMIN — LOSARTAN POTASSIUM 50 MILLIGRAM(S): 100 TABLET, FILM COATED ORAL at 05:16

## 2022-12-14 RX ADMIN — Medication 25 MILLIGRAM(S): at 05:16

## 2022-12-14 RX ADMIN — Medication 2: at 12:32

## 2022-12-14 NOTE — DISCHARGE NOTE NURSING/CASE MANAGEMENT/SOCIAL WORK - NSDCFUADDAPPT_GEN_ALL_CORE_FT
You have an appointment with your PCP, Dr. Parisi, on 12/21/22 at 11:00am. Please call [phone] if you need to reschedule.

## 2022-12-14 NOTE — DISCHARGE NOTE NURSING/CASE MANAGEMENT/SOCIAL WORK - NSDCPEFALRISK_GEN_ALL_CORE
For information on Fall & Injury Prevention, visit: [email or web address] OR  [email or web address] OR  https://www.cdc.gov/steadi/patient.html

## 2022-12-14 NOTE — CHART NOTE - NSCHARTNOTEFT_GEN_A_CORE
RN called to notify pt with 4beats of nonsustained VTach.   Pt is asymptomatic without chest pain, palpitations, SOB, dizziness, LOC, syncope, blurred vision.     Vital Signs Last 24 Hrs  T(C): 36.4 (14 Dec 2022 01:25), Max: 37 (13 Dec 2022 08:33)  T(F): 97.5 (14 Dec 2022 01:25), Max: 98.6 (13 Dec 2022 08:33)  HR: 66 (14 Dec 2022 01:25) (64 - 82)  BP: 115/64 (14 Dec 2022 01:25) (115/64 - 195/82)  RR: 18 (14 Dec 2022 01:25) (15 - 18)  SpO2: 96% (14 Dec 2022 01:25) (96% - 98%)  Parameters below as of 14 Dec 2022 01:25  Patient On (Oxygen Delivery Method): room air    Plan  Ordered stat BMP, mg, phos  f/u labs  VSS  continue to monitor   RN to notify for acute changes in pt status RN called to notify pt with 4beats of nonsustained VTach.   Pt is asymptomatic without chest pain, palpitations, SOB, dizziness, LOC, syncope, blurred vision.     Vital Signs Last 24 Hrs  T(C): 36.4 (14 Dec 2022 01:25), Max: 37 (13 Dec 2022 08:33)  T(F): 97.5 (14 Dec 2022 01:25), Max: 98.6 (13 Dec 2022 08:33)  HR: 66 (14 Dec 2022 01:25) (64 - 82)  BP: 115/64 (14 Dec 2022 01:25) (115/64 - 195/82)  RR: 18 (14 Dec 2022 01:25) (15 - 18)  SpO2: 96% (14 Dec 2022 01:25) (96% - 98%)  Parameters below as of 14 Dec 2022 01:25  Patient On (Oxygen Delivery Method): room air    Plan  Ordered stat BMP, mg, phos  f/u labs  VSS  continue to monitor   RN to notify for acute changes in pt status    ADDENDUM 0529  Labs reviewed, no repletion required at this time  continue to monitor labs  VS remain stable  continue to monitor pt status  notify for acute changes

## 2022-12-14 NOTE — PROGRESS NOTE ADULT - ASSESSMENT
89 year old with history of HTN, CAD w remote history of CABG, last PCI 7/22 (PATRIC to RCA for instent restenosis) and 8/22 (mid lAD PATRIC via LIMA) admitted with  1.  Chest pain,  No sign of ACS based on troponin levels and EKG (unchanged compared to office EKG). Recent cath results as described  2. s/p echo (12/12/2022) EF 30 - 35%, WMA in territory of LCX/RCA, Moderate MR, Moderate AR, RVSP - 69 mmHg c/w severe pulmonary HTN  3. s/p cath (12/13/2022) patent LIMA -> LAD, patent SVG - OM1, native vessels: 100%mLAD, patent mid/distal RCA stents, EF - 30 - 35%,  Moderate to severe MR, Moderate AR  4. s/p RHC (12/13/2022) PASP - 46/17 mmHG, PCWP - 11mmHg, RA - 4 mmHg.       Recommendations   Patient does not have evidence of progressive CAD.   He has moderate to severe MR but his PCWP is normal.   Would change the losartan to Entresto 24/26 BID.   Continue beta blocker.   Outpatient follow up. 
Patient is a 89 year old male with PMH of CAD s/p CABG, s/p PCI, DM, HTN, GERD, OA, Ascending Thoracic Aortic Aneurysm, Carotid Stenosis, HFrEF, Claudication, HLD and Glaucoma who presented to the ED with two day history of chest pressure and found to have a newly reduced EF with segmental wall motion abnormalities.     1. Chest pressure; history of CAD s/p CABG  -serial TNI negative   -EKG without new ischemic changes  -TTE with newly reduced EF 30-35% with segmental wall motion abnormalities  -continue aspirin, plavix and lipitor  -HbA1c and lipid panel reviewed  -NPO for LHC/RHC today  -cardio consult appreciated    2. 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 Claudication; chronic  -patient with chronically cool/cyanotic feet  -reports having an angiogram recently but unable to recall vascular surgeons name  -pulses present via doppler  -continue ASA, plavix and high intensity statin  -follow up with vascular surgery as outpatient    4. DM  -HbA1c 7.2  -hold metformin  -ISS  -ADA diet    5. HTN  -BP controlled  -lasix, toprol and losartan as above  -low sodium diet    6. GERD  -continue PPI    7. Glaucoma  -continue timolol and brimonidine     8. HLD  -check lipid panel  -continue statin    9. History of Carotid Stenosis  -continue DAPT and statin     10. Ascending Thoracic Aortic Aneurysm  -TTE with dilated aortic root  -outpatient surveillance and follow up    11. Transaminitis likely due to hepatic congestion  -monitor LFTS while on statin    DVT ppx- SCDs     Dispo - NPO for LHC/RHC today.  
89 year old with history of HTN, CAD w remote history of CABG, last PCI 7/22 (PATRIC to RCA for instent restenosis) and 8/22 (mid lAD PATRIC via LIMA) admitted with  1.  Chest pain,  No sign of ACS based on troponin levels and EKG (unchanged compared to office EKG). Recent cath results as described  2. s/p echo (12/12/2022) EF 30 - 35%, WMA in territory of LCX/RCA, Moderate MR, Moderate AR, RVSP - 69 mmHg c/w severe pulmonary HTN      Recommendations   EF 35% compare to prior 45% in May.   Left and Right heart cath today by Dr. Rodarte.   NPO after lunch.   
Assessment and Plant:   1. CAD  ·	No progression of CAD noted on LHC  ·	GDMT:   ·	     DAPT: Plavix 75 mg daily and aspirin 81 mg daily  ·	     Statin: Continue Lipitor 40 mg daily  ·	     Beta Blocker: Toprol 25 mg daily  ·	     Other Antianginals: N/A  ·	     Aggressive cardiovascular risk reduction and lifestyle modification.    2. HFrEF  ·	PCWP: 9  ·	GDMT  ·	     Beta Blocker: Toprol 25 mg daily  ·	     RAAS Inhibitor: Will likely change losartan 50 mg daily to Entresto 50 mg BID  ·	     MRA: N/A  ·	     Diuretic: Lasix 40 mg daily  ·	     SGLT2i: Would likely benefit from SGLT2i in setting of DM2 and HFrEF.  ·	     Other: N/A  ·	Strict I&O's  ·	Daily standing weights (if able)      Disposition: Probable discharge today.
Now s/p LHC via RFA/RFV with Dr. Rodarte, pt tolerated procedure well. Pt arrived to recovery in NAD and HDS, RFA 5 [place] sheath/ RFV 7 Guyanese sheath in place. access site stable, no bleed/hematoma, distal pulse +1, RLE remains warm to touch, palpable distal +1 DP pulse  Intraprocedurally: Lidocaine 1% 10 ml; NS bolus 50 ml; omnipaque 100ml  Findings: Patent stents in the native RCA and the mid LAD after the LIMA anastomosis. Reduced EF with moderate to severe Mitral regurgitation and moderate Aortic Insufficiency.Left main artery: There is an 80 % stenosis.  Proximal left anterior descending: There is a 100 % stenosis. Mid left anterior descending: The previously placed stent is a drug-eluting stent and is patent. Circumflex: Angiography shows mild atherosclerosis.  Mid right coronary artery: The previously placed stent is a drug-eluting stent and is patent. Distal right coronary artery: The previously placed stent is a drug-eluting stent and is patent. Right posterior atrioventricular: The previously placed stent is a drug-eluting stent and is patent.    Graft Angiography   LIMA graft to Mid left anterior descending: The segment is visually normal in size and structure.  SVG graft to First obtuse marginal: Angiography shows complete occlusion.  Aorta  Ascending aorta: There is mild aortic dilation. No patent grafts identified..    Left Heart Cath   The diaphragmatic segment is hypokinetic. The posterobasal segment is akinetic. Global left ventricular function is moderately depressed. Ejection fraction is 30%.  Valves Aortic Valve: There is no aortic valve stenosis. There is moderate (2+) aortic insufficiency. Mitral Valve: The mitral valve exhibits moderate to severe regurgitation.      RHC: SAT AO: 92.3%  SAT PA: 63.7  PA 46/17/27  PCW: 9  RA: 4/7/4  CO: 4.2/ CI 2.3      Plan:  -Formal cath report pending  -Post procedure management/monitoring per protocol  -Access site precautions  -Bedrest x 4 hours post sheath pull. Maintain bedrest until midnight. Ok to ambulate if right groin site remains c/d/i without active bleeding or groin hematoma  -Labs and EKG in am  -NS 0.9% 250ml/hr x 1 bolus: post procedure JOEY ppx Ok with Dr. Rodarte PCW 9. Per Dr. Rodarte, administer IVF very slowly over 4 hours  -Repeat ECG if any clinical indication or change on tele  -Continue current medical therapy  -Dual anti platelet therapy with aspirin/plavix   -Cont BB with Toprol 25mg po daily   -Cont statin therapy with Lipitor 40mg po qHS   -Educated regarding strict adherence with DAPT   -Educated regarding post procedure management and care  -Discussed the importance of RF modification  -Cardiac rehab info provided/referral and communication to cardiac rehab completed  -F/U outpt in 1-2 weeks with Cardiologist Dr. shankar  -DISPO: Further plan per hospitalist and General Cardiology

## 2022-12-14 NOTE — DISCHARGE NOTE NURSING/CASE MANAGEMENT/SOCIAL WORK - PATIENT PORTAL LINK FT
You can access the FollowMyHealth Patient Portal offered by [facility] by registering at the following website: [email or web address]. By joining [facility] [employer] [facility]’s FollowMyHealth portal, you will also be able to view your health information using other applications (apps) compatible with our system.

## 2022-12-14 NOTE — PROGRESS NOTE ADULT - SUBJECTIVE AND OBJECTIVE BOX
CHIEF COMPLAINT/INTERVAL HISTORY:    Patient is a 89y old  Male who presents with a chief complaint of chest pain (13 Dec 2022 10:13)    SUBJECTIVE & OBJECTIVE: Pt seen and examined at bedside. No overnight events. Sitting in chair; NAD. Denies any chest pain but reports pressure.    ROS: No chest pain, palpitations, SOB, light headedness, dizziness, headache, nausea/vomiting, fevers/chills, abdominal pain, dysuria.    ICU Vital Signs Last 24 Hrs  T(C): 36.8 (13 Dec 2022 14:29), Max: 37 (13 Dec 2022 08:33)  T(F): 98.2 (13 Dec 2022 14:29), Max: 98.6 (13 Dec 2022 08:33)  HR: 67 (13 Dec 2022 14:29) (64 - 82)  BP: 156/67 (13 Dec 2022 14:29) (135/63 - 180/73)  RR: 16 (13 Dec 2022 14:29) (16 - 18)  SpO2: 98% (13 Dec 2022 14:29) (96% - 100%)    O2 Parameters below as of 13 Dec 2022 14:29  Patient On (Oxygen Delivery Method): room air    MEDICATIONS  (STANDING):  aspirin  chewable 81 milliGRAM(s) Oral daily  atorvastatin 40 milliGRAM(s) Oral at bedtime  brimonidine 0.2% Ophthalmic Solution 1 Drop(s) Both EYES two times a day  clopidogrel Tablet 75 milliGRAM(s) Oral daily  dextrose 5%. 1000 milliLiter(s) (50 mL/Hr) IV Continuous <Continuous>  dextrose 5%. 1000 milliLiter(s) (100 mL/Hr) IV Continuous <Continuous>  dextrose 50% Injectable 25 Gram(s) IV Push once  dextrose 50% Injectable 12.5 Gram(s) IV Push once  dextrose 50% Injectable 25 Gram(s) IV Push once  furosemide    Tablet 40 milliGRAM(s) Oral daily  glucagon  Injectable 1 milliGRAM(s) IntraMuscular once  insulin lispro (ADMELOG) corrective regimen sliding scale   SubCutaneous three times a day before meals  losartan 50 milliGRAM(s) Oral daily  metoprolol succinate ER 25 milliGRAM(s) Oral daily  pantoprazole    Tablet 40 milliGRAM(s) Oral before breakfast  timolol 0.5% Solution 1 Drop(s) Both EYES daily    MEDICATIONS  (PRN):  dextrose Oral Gel 15 Gram(s) Oral once PRN Blood Glucose LESS THAN 70 milliGRAM(s)/deciliter  hydrALAZINE Injectable 10 milliGRAM(s) IV Push every 6 hours PRN SBP > 170      LABS:                        13.7   7.38  )-----------( 211      ( 13 Dec 2022 02:30 )             42.6     12-13    140  |  101  |  23.6<H>  ----------------------------<  137<H>  3.8   |  28.0  |  0.97    Ca    9.8      13 Dec 2022 02:30  Phos  2.9     12-13  Mg     1.9     12-13    TPro  6.8  /  Alb  3.8  /  TBili  0.7  /  DBili  0.2  /  AST  33  /  ALT  53<H>  /  AlkPhos  109  12-13    CAPILLARY BLOOD GLUCOSE      POCT Blood Glucose.: 161 mg/dL (13 Dec 2022 13:12)  POCT Blood Glucose.: 139 mg/dL (13 Dec 2022 07:58)  POCT Blood Glucose.: 182 mg/dL (12 Dec 2022 18:03)      PHYSICAL EXAM:    GENERAL: elderly male, sitting in chair, NAD  HEAD:  Atraumatic, Normocephalic  EYES: EOMI, PERRLA, conjunctiva and sclera clear  ENMT: Moist mucous membranes  NECK: Supple   NERVOUS SYSTEM:  Alert & Oriented X3, Motor Strength 5/5 B/L upper and lower extremities   CHEST/LUNG: Clear to auscultation bilaterally; No rales, rhonchi, wheezing, or rubs  HEART: Regular rate and rhythm; + S1/S2  ABDOMEN: Soft, Nontender, Nondistended; Bowel sounds present  EXTREMITIES:  + pedal edema  
S: Patient denies chest pain or dyspnea. He is sitting out of bed in a chair.     TELEMETRY:  sr    MEDICATIONS  (STANDING):  aspirin  chewable 81 milliGRAM(s) Oral daily  atorvastatin 40 milliGRAM(s) Oral at bedtime  brimonidine 0.2% Ophthalmic Solution 1 Drop(s) Both EYES two times a day  clopidogrel Tablet 75 milliGRAM(s) Oral daily  dextrose 5%. 1000 milliLiter(s) (50 mL/Hr) IV Continuous <Continuous>  dextrose 5%. 1000 milliLiter(s) (100 mL/Hr) IV Continuous <Continuous>  dextrose 50% Injectable 25 Gram(s) IV Push once  dextrose 50% Injectable 12.5 Gram(s) IV Push once  dextrose 50% Injectable 25 Gram(s) IV Push once  furosemide    Tablet 40 milliGRAM(s) Oral daily  glucagon  Injectable 1 milliGRAM(s) IntraMuscular once  insulin lispro (ADMELOG) corrective regimen sliding scale   SubCutaneous three times a day before meals  losartan 50 milliGRAM(s) Oral daily  metoprolol succinate ER 25 milliGRAM(s) Oral daily  pantoprazole    Tablet 40 milliGRAM(s) Oral before breakfast  timolol 0.5% Solution 1 Drop(s) Both EYES daily    MEDICATIONS  (PRN):  dextrose Oral Gel 15 Gram(s) Oral once PRN Blood Glucose LESS THAN 70 milliGRAM(s)/deciliter  hydrALAZINE Injectable 10 milliGRAM(s) IV Push every 6 hours PRN SBP > 170        Vital Signs Last 24 Hrs  T(C): 37 (13 Dec 2022 08:33), Max: 37 (13 Dec 2022 08:33)  T(F): 98.6 (13 Dec 2022 08:33), Max: 98.6 (13 Dec 2022 08:33)  HR: 64 (13 Dec 2022 08:33) (64 - 83)  BP: 135/63 (13 Dec 2022 08:33) (135/63 - 180/73)  BP(mean): --  RR: 18 (13 Dec 2022 08:33) (16 - 18)  SpO2: 96% (13 Dec 2022 08:33) (96% - 100%)    Parameters below as of 13 Dec 2022 08:33  Patient On (Oxygen Delivery Method): room air        Daily     Daily     I&O's Detail      PHYSICAL EXAM:  Appearance: In NAD  Cardiovascular: Normal S1 S2  Respiratory: Lungs clear to auscultation	  Gastrointestinal:  Soft, Non-tender, + BS, no bruits	  Neurologic: Grossly non-focal.  Extremities: No edema    LABS:                        13.7   7.38  )-----------( 211      ( 13 Dec 2022 02:30 )             42.6     12-13    140  |  101  |  23.6<H>  ----------------------------<  137<H>  3.8   |  28.0  |  0.97    Ca    9.8      13 Dec 2022 02:30  Phos  2.9     12-13  Mg     1.9     12-13    TPro  6.8  /  Alb  3.8  /  TBili  0.7  /  DBili  0.2  /  AST  33  /  ALT  53<H>  /  AlkPhos  109  12-13    CARDIAC MARKERS ( 13 Dec 2022 06:50 )  x     / 0.02 ng/mL / x     / x     / x      CARDIAC MARKERS ( 12 Dec 2022 12:00 )  x     / <0.01 ng/mL / x     / x     / x      CARDIAC MARKERS ( 12 Dec 2022 08:25 )  x     / <0.01 ng/mL / x     / x     / x      CARDIAC MARKERS ( 12 Dec 2022 04:41 )  x     / <0.01 ng/mL / x     / x     / x          Echo (12/12/2022)     Summary:   1. Mildly enlarged left atrium.   2. Segmental wall motion abnormalities in LCx and RCA territory.   3. Left ventricular ejection fraction, by visual estimation, is 30 to   35%.   4. Moderately enlarged right atrium.   5. Moderately enlarged right ventricle. Moderately reduced RV systolic   function.   6. Moderate mitral valve regurgitation.   7. Moderate aortic regurgitation.   8. Estimated pulmonary artery systolic pressure is 69.0 mmHg -severe   pulmonary hypertension.   9. Dilatation of the aortic root.  10. There is no evidence of pericardial effusion.  
INTERVENTIONAL CARDIOLOGY NOTE                                                                                             History obtained by: Patient and medical record  Community Cardiologist: Dr. Krista Chino  Reason for Consultation: Evaluation for cardiac catheterization  Available pt records reviewed: Yes [ x ] No [  ]    Chief complaint:    Patient is a 89y old  Male who presents with a chief complaint of chest pain (13 Dec 2022 14:35)      HPI:  Patient is a 89 year old male with PMH of CAD s/p CABG 25 years ago at [facility] (LIMA to mid LAD; ANTON graft to distal RCA), s/p PCI (LAD m RCA; p. RCA; RPL), DM,  HTN, GERD, OA, Ascending Thoracic Aortic Aneurysm Carotid Stenosis, HFrEF 45%, Claudication, HLD and Glaucoma who presented to the ED with two day history of chest pressure. Patient reports he actively works as a . Patient reports being in his usual state of health until he was outside helping his grandson move some things around when the chest pressure started. States it is midsternal and does not radiate. He reports belching helps alleviate his symptoms, but since they were persistent he decided to come to the ED for further evaluation. Furthermore, patient reports the chest pressure is associated with heavy breathing, but denies associated dizziness, nausea, or diaphoresis. In the ED, patient did not have any new EKG changes and serial TNI were negative. However, a TTE was obtained which revealed a new reduction in his EF and segmental wall motion abnormalities. HE was recently here this summer with PCI 7/2022 PATRIC to RCA for ISR and in 8/2022 for PATRIC to native m LAD distal to LIMA anastomosis. Patient was seen by cardiology and admission for C was recommended. Patient seen and examined, reports the chest pressure is mild currently.  Denies any lightheadedness, dizziness, SOB at rest, palpitations, nausea, vomiting, abdominal pain, dysuria, diarrhea, fevers or chills. (12 Dec 2022 17:35)      PAST MEDICAL HISTORY  High cholesterol    Diabetes    MI, old    HTN (hypertension)    GERD (gastroesophageal reflux disease)    Arthritis        Associated Risk Factors:        Frailty Assessment: (none/mild/mod/severe): mild       Cerebrovascular Disease: N/A       Chronic Lung Disease: N/A       Peripheral Arterial Disease: N/A       Chronic Kidney Disease (if yes, what is GFR): N/A       Uncontrolled Diabetes (if yes, what is HgbA1C or FBS): N/A       Poorly Controlled Hypertension (if yes, what is SBP): N/A       Morbid Obesity (if yes, what is BMI): N/A       History of Recent Ventricular Arrhythmia: N/A       Inability to Ambulate Safely: N/A       Need for Therapeutic Anticoagulation: N/A       Antiplatelet or Contrast Allergy: N/A      PAST SURGICAL HISTORY  S/P CABG (coronary artery bypass graft)    History of coronary artery stent placement        SOCIAL HISTORY:  Lives with wife Joleen, former smoker only for 5 years 0.25 packs per day and quit >60 years ago, denies etoh, denies illicit drug use    FAMILY HISTORY:  FH: hypertension      Family History of Premature Cardiovascular Disease:  Yes [  ] No [ X ]    HOME MEDICATIONS:  aspirin 81 mg oral tablet: 1 tab(s) orally once a day (30 Aug 2022 13:19)  atorvastatin: 1 tab(s) orally once a day (in the evening) (31 Aug 2022 08:39)  brimonidine 0.2% ophthalmic solution: 1 drop(s) to each affected eye 2 times a day (30 Aug 2022 13:19)  furosemide 40 mg oral tablet: 1 tab(s) orally once a day (30 Aug 2022 19:46)  losartan 50 mg oral tablet: 1 tab(s) orally once a day (30 Aug 2022 13:19)  metFORMIN 500 mg oral tablet: 1 tab(s) orally 2 times a day  Hold X 2 days post procedure. Restart on 9/2/22 (31 Aug 2022 08:39)  Protonix 40 mg oral delayed release tablet: 1 tab(s) orally once a day (12 Dec 2022 19:12)  Timolol Maleate, long-acting 0.5% ophthalmic solution: 1 drop(s) to each affected eye once a day (in the morning) (30 Aug 2022 13:19)      CURRENT CARDIAC MEDICATIONS:  furosemide    Tablet 40 milliGRAM(s) Oral daily  hydrALAZINE Injectable 10 milliGRAM(s) IV Push every 6 hours PRN SBP > 170  losartan 50 milliGRAM(s) Oral daily  metoprolol succinate ER 25 milliGRAM(s) Oral daily      ALLERGIES:   No Known Allergies      REVIEW OF SYMPTOMS:   CONSTITUTIONAL: no fever, no chills, no weight loss, no weight gain, no fatigue   CARDIOVASCULAR: no active chest pain, no chest pressure, no SOB, no palpitations, no dizziness  RESPIRATORY: no Shortness of breath, no cough, no wheezing  : No dysuria, no hematuria   GI: No dark color stool, no nausea, no diarrhea, no constipation, no abdominal pain   NEURO: No headache, no slurred speech   ALL OTHER REVIEW OF SYSTEMS ARE NEGATIVE.    VITAL SIGNS:  T(C): 36.8 (12-13-22 @ 14:29), Max: 37 (12-13-22 @ 08:33)  T(F): 98.2 (12-13-22 @ 14:29), Max: 98.6 (12-13-22 @ 08:33)  HR: 67 (12-13-22 @ 14:29) (64 - 82)  BP: 156/67 (12-13-22 @ 14:29) (135/63 - 180/73)  RR: 16 (12-13-22 @ 14:29) (16 - 18)  SpO2: 98% (12-13-22 @ 14:29) (96% - 100%)    INTAKE AND OUTPUT:       PHYSICAL EXAM:  Constitutional: Comfortable . No acute distress.   HEENT: Atraumatic and normocephalic , neck is supple . no JVD. No carotid bruit.  CNS: A&Ox3. No focal deficits.   Respiratory: CTAB, unlabored   Cardiovascular: RRR normal s1 s2. No murmur. No rubs or gallop.  Gastrointestinal: Soft, non-tender. +Bowel sounds.   Extremities: 2+ Peripheral Pulses radial, b/l LE warm to touch up until mid shin and becomes cooler to touch. feet cool to touch. sensation present. 2+ DP pulses. +1 pitting leg edema. No clubbing, cyanosis, or edema  Psychiatric: Calm . no agitation.   Skin: Warm and dry, no ulcers on extremities     LABS:  ( 13 Dec 2022 06:50 )  Troponin T  0.02 ,  CPK  X    , CKMB  X    , BNP X        , ( 12 Dec 2022 12:00 )  Troponin T  <0.01,  CPK  X    , CKMB  X    , BNP X        , ( 12 Dec 2022 08:25 )  Troponin T  <0.01,  CPK  X    , CKMB  X    , BNP X                                  13.7   7.38  )-----------( 211      ( 13 Dec 2022 02:30 )             42.6     12-13    140  |  101  |  23.6<H>  ----------------------------<  137<H>  3.8   |  28.0  |  0.97    Ca    9.8      13 Dec 2022 02:30  Phos  2.9     12-13  Mg     1.9     12-13    TPro  6.8  /  Alb  3.8  /  TBili  0.7  /  DBili  0.2  /  AST  33  /  ALT  53<H>  /  AlkPhos  109  12-13 12-13-22 @ 02:30  CHolesterol: 125,  HDL: 45,  LDL: 67, Triglycerides: 64               Prior ECG: Yes [ X ] No [  ]    CARDIAC TESTING   ECHO: < from: TTE Echo Complete w/o Contrast w/ Doppler (12.12.22 @ 12:36) >  Summary:   1. Mildly enlarged left atrium.   2. Segmental wall motion abnormalities in LCx and RCA territory.   3. Left ventricular ejection fraction, by visual estimation, is 30 to   35%.   4. Moderately enlarged right atrium.   5. Moderately enlarged right ventricle. Moderately reduced RVsystolic   function.   6. Moderate mitral valve regurgitation.   7. Moderate aortic regurgitation.   8. Estimated pulmonary artery systolic pressure is 69.0 mmHg -severe   pulmonary hypertension.   9. Dilatation of the aortic root.  10. There is no evidence of pericardial effusion.  11. ER team informed of the findings.    MD Marcelo, RPVI Electronically signed on 12/12/2022 at 3:35:42   PM    < end of copied text >      CATH: < from: Cardiac Catheterization (08.30.22 @ 14:56) >  Diagnostic Findings:     Coronary Angiography   The coronary circulation is right dominant.      LAD   Mid left anterior descending: This lesion is distal to the LIMA  anastomosis.. There is an 85 % stenosis.    RCA     Patient: CRISTÓBAL YEE              MRN: [id number]  Study Date: 08/30/2022   02:56 PM      Page 1 of 4          Midright coronary artery: The previously placed stent is a  drug-eluting stent and is patent. Distal right coronary artery: The  previously placed stent is a drug-eluting stent and is patent. First  right posterolateral: The previously placed stent is a  drug-eluting stent and is patent.        < end of copied text >      < from: Cardiac Catheterization (07.12.22 @ 14:26) >  Conclusions:   Severe native 3 vessel disease.     Only LIMA to LAD patent.   80% narrowing of the native mid LAD distal to the LIMA anastomosis.   Severe in stent restenosis of the native RCA; successfully restented  with larger caliber stents  Recommendations:     ASA and Plavix.   Patient to return in 2-4 weeks for PCI of mid LAD via the LIMA.   Acute complication:    No complications     Procedure Narrative:   The risks and alternatives of the procedures and conscious sedation  were explained to the patient and informed consent was  obtained. The patient was brought to the cath lab and placed on the  exam table.  Access   Right femoral artery:   Vascular access was obtained using modified seldinger technique.      Coronary Angiography   Left Coronary System:   A catheter was positioned into the vessel ostia under fluoroscopic  guidance. Angiograms were obtained in multiple views.  Right Coronary System:   A catheter was positioned into the vessel ostia under fluoroscopic  guidance. Angiograms were obtained in multiple views.    Diagnostic Findings:     Patient: CRISTÓBAL YEE              MRN: [id number]  Study Date: 07/12/2022   02:26 PM      Page 1 of 6          Coronary Angiography   The coronary circulation is right dominant.      LM   Left main artery: There is an 80 % stenosis.      LAD   Proximal left anterior descending: There is a 100 % stenosis. Mid left  anterior descending: This stenosis is distal to the LIMA  anastomosis.. There is an 80 % stenosis.      CX   Circumflex: Angiography shows mild atherosclerosis.      RCA   Proximal right coronary artery: The previous stents are undersized..  There is an 80 % stenosis. Intravascular ultrasound was  performed. Imaging shows minimal cross sectional area of 2.4 mm2. The  previously placed stent is a drug-eluting stent and is  partially occluded. Mid right coronary artery: There is an 80 %  stenosis. Intravascular ultrasound was performed. Imaging  shows minimal cross sectional area of 2.4 mm2. The previously placed  stent is a drug-eluting stent and is partially occluded.  Distal right coronary artery: There is an 80 % stenosis. The  previously placed stent is a drug-eluting stent and is partially  occluded. First right posterolateral: There is an 80 % stenosis.      Graft Angiography   LIMA graft to Mid left anterior descending: Thesegment is visually  normal in size and structure.  ANTON graft to Distal right coronary artery: Angiography shows complete  occlusion.    < end of copied text >          Cardiac Cath Risk Assessments:  ASA: 3  Mallampati: 2  Bleeding Risk: 1.4%  Creatinine: 0.97  GFR: 74    PLAN:  -NPO for cardiac cath  -Procedure and risks explained to patient  -Consent to be obtained by IC  -Aspirin 81mg and Plavix 75mg given pre procedure  -IV hydration protocol held for EF 30-35%, active diuresis with IVP lasix; leg edema; moderate reduced RV systolic function  
S: Patient denies chest pain or dyspnea.    TELEMETRY: sr    MEDICATIONS  (STANDING):  aspirin  chewable 81 milliGRAM(s) Oral daily  atorvastatin 40 milliGRAM(s) Oral at bedtime  brimonidine 0.2% Ophthalmic Solution 1 Drop(s) Both EYES two times a day  clopidogrel Tablet 75 milliGRAM(s) Oral daily  dextrose 5%. 1000 milliLiter(s) (50 mL/Hr) IV Continuous <Continuous>  dextrose 5%. 1000 milliLiter(s) (100 mL/Hr) IV Continuous <Continuous>  dextrose 50% Injectable 25 Gram(s) IV Push once  dextrose 50% Injectable 12.5 Gram(s) IV Push once  dextrose 50% Injectable 25 Gram(s) IV Push once  furosemide    Tablet 40 milliGRAM(s) Oral daily  glucagon  Injectable 1 milliGRAM(s) IntraMuscular once  insulin lispro (ADMELOG) corrective regimen sliding scale   SubCutaneous three times a day before meals  losartan 50 milliGRAM(s) Oral daily  magnesium sulfate  IVPB 1 Gram(s) IV Intermittent once  metoprolol succinate ER 25 milliGRAM(s) Oral daily  pantoprazole    Tablet 40 milliGRAM(s) Oral before breakfast  potassium chloride    Tablet ER 40 milliEquivalent(s) Oral once  timolol 0.5% Solution 1 Drop(s) Both EYES daily    MEDICATIONS  (PRN):  dextrose Oral Gel 15 Gram(s) Oral once PRN Blood Glucose LESS THAN 70 milliGRAM(s)/deciliter  hydrALAZINE Injectable 10 milliGRAM(s) IV Push every 6 hours PRN SBP > 170        Vital Signs Last 24 Hrs  T(C): 36.5 (14 Dec 2022 04:36), Max: 36.8 (13 Dec 2022 14:29)  T(F): 97.7 (14 Dec 2022 04:36), Max: 98.2 (13 Dec 2022 14:29)  HR: 82 (14 Dec 2022 04:36) (66 - 82)  BP: 145/73 (14 Dec 2022 04:36) (115/64 - 195/82)  BP(mean): --  RR: 18 (14 Dec 2022 04:36) (15 - 18)  SpO2: 100% (14 Dec 2022 04:36) (96% - 100%)    Parameters below as of 14 Dec 2022 04:36  Patient On (Oxygen Delivery Method): room air        Daily Height in cm: 165.1 (14 Dec 2022 01:25)    Daily Weight in k (14 Dec 2022 01:25)    I&O's Detail    13 Dec 2022 07:01  -  14 Dec 2022 07:00  --------------------------------------------------------  IN:    Oral Fluid: 120 mL  Total IN: 120 mL    OUT:    Voided (mL): 550 mL  Total OUT: 550 mL    Total NET: -430 mL          PHYSICAL EXAM:  Appearance: In NAD  Cardiovascular: Normal S1 S2  Respiratory: Lungs clear to auscultation	  Gastrointestinal:  Soft, Non-tender, + BS, no bruits	  Neurologic: Grossly non-focal.  Extremities: No edema  RFA negative.     LABS:                        15.5   10.63 )-----------( 252      ( 14 Dec 2022 03:50 )             48.1     12-    140  |  99  |  28.9<H>  ----------------------------<  121<H>  3.8   |  27.0  |  1.05    Ca    9.8      14 Dec 2022 03:50  Phos  3.6     12-14  Mg     1.8         TPro  6.8  /  Alb  3.8  /  TBili  0.7  /  DBili  0.2  /  AST  33  /  ALT  53<H>  /  AlkPhos  109  12-13    CARDIAC MARKERS ( 13 Dec 2022 06:50 )  x     / 0.02 ng/mL / x     / x     / x      CARDIAC MARKERS ( 12 Dec 2022 12:00 )  x     / <0.01 ng/mL / x     / x     / x              I&O's Summary    13 Dec 2022 07:01  -  14 Dec 2022 07:00  --------------------------------------------------------  IN: 120 mL / OUT: 550 mL / NET: -430 mL         
                                                                         Department of Cardiology                                                                  [facility]/[place] [address] [id number]                                                            Telephone: [phone]. Fax:[phone]                                                                      INTERVENTIONAL CARDIOLOGY NOTE       Subjective:  89y Male who had a left heart catheterization which showed:  Coronary Angiography   ·	The coronary circulation is right dominant.    LM   ·	Left main artery: There is an 80 % stenosis.    LAD   ·	Proximal left anterior descending: There is a 100 % stenosis.   ·	Mid left anterior descending: The previously placed stent is a drug-eluting stent and is patent.    CX   ·	Circumflex: Angiography shows mild atherosclerosis.    RCA   ·	Mid right coronary artery: The previously placed stent is a drug-eluting stent and is patent.   ·	Distal right coronary artery: The previously placed stent is a drug-eluting stent and is patent.   ·	Right posterior atrioventricular: The previously placed stent is a drug-eluting stent and is patent.    Graft Angiography   ·	LIMA graft to Mid left anterior descending: The segment is visually normal in size and structure.   ·	SVG graft to First obtuse marginal: Angiography shows complete occlusion.  Aorta   ·	Ascending aorta: There is mild aortic dilation. No patent grafts identified.  Left Heart Cath   ·	The diaphragmatic segment is hypokinetic. The posterobasal segment is akinetic. Global left ventricular function is moderately depressed. Ejection fraction is 30%.    Valves   ·	Aortic Valve: There is no aortic valve stenosis. There is moderate (2+) aortic insufficiency.  ·	Mitral Valve: The mitral valve exhibits moderate to severe regurgitation.  Right Heart Pressures:       RA: 4       RV: 55/4       PA: 46/17/27       PCWP: 9       CO: 4.27 LPM       CI: 2.31 LPM/m2       SVR: 22.5 Wood Units       PVR: 4.22 Wood Units    Interval history: No chest pain, SOB, or palpitations overnight.    HPI: Patient is a 89 year old male with PMH of CAD s/p CABG, s/p PCI, DM, CAD, HTN, GERD, OA, Ascending Thoracic Aortic Aneurysm Carotid Stenosis, HFrEF, Claudication, HLD and Glaucoma who presented to the ED with two day history of chest pressure.  Patient reports being in his usual state of health until he was outside helping his grandson move some things around when the chest pressure started. States it is midsternal and does not radiate. He reports belching helps alleviate his symptoms, but since they were persistent he decided to come to the ED for further evaluation. Furthermore, patient reports the chest pressure is associated with heavy breathing, but denies associated dizziness, nausea, or diaphoresis. In the ED, patient did not have any new EKG changes and serial TNI were negative. However, a TTE was obtained which revealed a new reduction in his EF and segmental wall motion abnormalities. Patient was seen by cardiology and admission for [facility] was recommended. Patient seen and examined, reports the chest pressure is mild currently. Denies any lightheadedness, dizziness, SOB at rest, palpitations, nausea, vomiting, abdominal pain, dysuria, diarrhea, fevers or chills. (12 Dec 2022 17:35)    PAST MEDICAL & SURGICAL HISTORY:  High cholesterol  Diabetes  MI, old  HTN (hypertension)  GERD (gastroesophageal reflux disease)  Arthritis  S/P CABG (coronary artery bypass graft)  History of coronary artery stent placement    Allergies: No Known Allergies    Home Medications:  aspirin 81 mg oral tablet: 1 tab(s) orally once a day (30 Aug 2022 13:19)  atorvastatin: 1 tab(s) orally once a day (in the evening) (31 Aug 2022 08:39)  brimonidine 0.2% ophthalmic solution: 1 drop(s) to each affected eye 2 times a day (30 Aug 2022 13:19)  furosemide 40 mg oral tablet: 1 tab(s) orally once a day (30 Aug 2022 19:46)  losartan 50 mg oral tablet: 1 tab(s) orally once a day (30 Aug 2022 13:19)  metFORMIN 500 mg oral tablet: 1 tab(s) orally 2 times a day  Hold X 2 days post procedure. Restart on 9/2/22 (31 Aug 2022 08:39)  Protonix 40 mg oral delayed release tablet: 1 tab(s) orally once a day (12 Dec 2022 19:12)  Timolol Maleate, long-acting 0.5% ophthalmic solution: 1 drop(s) to each affected eye once a day (in the morning) (30 Aug 2022 13:19)    MEDICATIONS  (STANDING):  aspirin  chewable 81 milliGRAM(s) Oral daily  atorvastatin 40 milliGRAM(s) Oral at bedtime  brimonidine 0.2% Ophthalmic Solution 1 Drop(s) Both EYES two times a day  clopidogrel Tablet 75 milliGRAM(s) Oral daily  dextrose 5%. 1000 milliLiter(s) (50 mL/Hr) IV Continuous <Continuous>  dextrose 5%. 1000 milliLiter(s) (100 mL/Hr) IV Continuous <Continuous>  dextrose 50% Injectable 25 Gram(s) IV Push once  dextrose 50% Injectable 12.5 Gram(s) IV Push once  dextrose 50% Injectable 25 Gram(s) IV Push once  furosemide    Tablet 40 milliGRAM(s) Oral daily  glucagon  Injectable 1 milliGRAM(s) IntraMuscular once  insulin lispro (ADMELOG) corrective regimen sliding scale   SubCutaneous three times a day before meals  losartan 50 milliGRAM(s) Oral daily  metoprolol succinate ER 25 milliGRAM(s) Oral daily  pantoprazole    Tablet 40 milliGRAM(s) Oral before breakfast  timolol 0.5% Solution 1 Drop(s) Both EYES daily    MEDICATIONS  (PRN):  dextrose Oral Gel 15 Gram(s) Oral once PRN Blood Glucose LESS THAN 70 milliGRAM(s)/deciliter  hydrALAZINE Injectable 10 milliGRAM(s) IV Push every 6 hours PRN SBP > 170    ROS:   General: No fatigue  HEENT: No headache, no epistaxis.  CV: No chest pain, no palpitations.  Respiratory: No SOB, no cough, no wheeze  GI: No nausea    Objective:  Vital Signs Last 24 Hrs  T(C): 36.5 (14 Dec 2022 04:36), Max: 36.8 (13 Dec 2022 14:29)  T(F): 97.7 (14 Dec 2022 04:36), Max: 98.2 (13 Dec 2022 14:29)  HR: 82 (14 Dec 2022 04:36) (66 - 82)  BP: 145/73 (14 Dec 2022 04:36) (115/64 - 195/82)  RR: 18 (14 Dec 2022 04:36) (15 - 18)  SpO2: 100% (14 Dec 2022 04:36) (96% - 100%)    Parameters below as of 14 Dec 2022 04:36  Patient On (Oxygen Delivery Method): room air    CM: SR with 1st degree AV block, occasional PVC's, 1 3 beat NSVT.  Neuro: A&OX3, CN 2-12 intact  HEENT: NC, AT  Lungs: CTA B/L  CV: S1, S2, no murmur, RRR  Abd: Soft  Right Groin: Soft, no bleeding, no hematoma  Extremity: 2+ distal pulses                            15.5   10.63 )-----------( 252      ( 14 Dec 2022 03:50 )             48.1     12-14    140  |  99    |  28.9  ----------------------------<  121  3.8   |  27.0  |  1.05    Ca    9.8      14 Dec 2022 03:50  Phos  3.6     12-14  Mg     1.8     12-14    TPro  6.8  /  Alb  3.8  /  TBili  0.7  /  DBili  0.2  /  AST  33  /  ALT  53  /  AlkPhos  109  12-13    CARDIAC MARKERS ( 13 Dec 2022 06:50 ): 0.02 ng/mL     CARDIAC MARKERS ( 12 Dec 2022 12:00 ): <0.01 ng/mL

## 2022-12-27 NOTE — H&P ADULT - REASON FOR ADMISSION
chest pain Tremfya Counseling: I discussed with the patient the risks of guselkumab including but not limited to immunosuppression, serious infections, and drug reactions.  The patient understands that monitoring is required including a PPD at baseline and must alert us or the primary physician if symptoms of infection or other concerning signs are noted.

## 2023-01-10 PROBLEM — M79.671 RIGHT FOOT PAIN: Status: ACTIVE | Noted: 2023-01-10

## 2023-01-14 ENCOUNTER — APPOINTMENT (OUTPATIENT)
Dept: ORTHOPEDIC SURGERY | Facility: CLINIC | Age: 88
End: 2023-01-14

## 2023-01-14 DIAGNOSIS — M79.671 PAIN IN RIGHT FOOT: ICD-10-CM

## 2023-01-14 NOTE — ED PROVIDER NOTE - NSTIMEPROVIDERCAREINITIATE_GEN_ER
[facility] [place] [facility]        NAME: Ninfa Duran is a 55 y o  female  : 1976    MRN: [id number]  DATE: 2023  TIME: 10:29 PM    Assessment and Plan   Sprain of interphalangeal joint of right little finger, initial encounter [S61 [id number]]  1  Sprain of interphalangeal joint of right little finger, initial encounter  XR finger right fifth digit-pinkie    Ambulatory referral to Hand Surgery      Patient presents with crushing injury to the right small finger recommend x-ray for further work-up for evaluation  X-rays demonstrate no acute fractures or dislocations per my read she does have increased excursion with radial collateral ligament testing concerning for sprain  Patient is placed in buddy loops and is provided with a referral for hand surgery for definitive treatment and management  Discussed continued ice elevation and over-the-counter NSAIDs as needed  Report to the emergency room if symptoms worsen  Patient Instructions     Patient Instructions   Buddy tape to the ring finger until follow-up with hand surgery  Referral was provided  Continue icing elevation, Motrin, and Tylenol as needed  If symptoms worsen report to the emergency room  Follow up with PCP in 3-5 days  Proceed to  ER if symptoms worsen  Chief Complaint     Chief Complaint   Patient presents with   • Finger Injury     Pt  States that she dropped a dresser on her right hand pinkie finger 16-17 days ago  History of Present Illness       51-year-old female presents with complaint of right small finger pain  Patient reports that he was assisting her  and moving a dresser out of a truck when the dresser fell onto her right pinky finger in the lip on the truck  She reports that it was initially very bruised and swollen and this is improved with Tylenol and Motrin  However she has a lot of pain particularly at the DIP joint  Patient denies any numbness or tingling of the digit    Denies any prior injuries  Review of Systems   Review of Systems   Musculoskeletal: Positive for arthralgias and myalgias  Current Medications       Current Outpatient Medications:   •  cholecalciferol (VITAMIN D3) 1,000 units tablet, Take 1,000 Units by mouth daily, Disp: , Rfl:   •  magnesium oxide (MAG-OX) 400 mg, Take 400 mg by mouth once Take one tablet by mouth daily  , Disp: , Rfl:   •  predniSONE 10 mg tablet, Take 1 tablet (10 mg total) by mouth daily 6 tab day 1, 5 tab day 2, 4 tab day 3, 3 tab day 4, 2 tab day 5, 1 tab day 6 (Patient not taking: Reported on 1/13/2023), Disp: 21 tablet, Rfl: 0    Current Allergies     Allergies as of 01/13/2023   • (No Known Allergies)            The following portions of the patient's history were reviewed and updated as appropriate: allergies, current medications, past family history, past medical history, past social history, past surgical history and problem list      Past Medical History:   Diagnosis Date   • Anemia 1994    During first pregnancy   • Basal cell carcinoma 2015    Right cheek   • Migraine    • No known health problems        Past Surgical History:   Procedure Laterality Date   • APPENDECTOMY     • BREAST SURGERY     • ENDOMETRIAL ABLATION     • REDUCTION MAMMAPLASTY Bilateral 2004   • TONSILLECTOMY         Family History   Problem Relation Age of Onset   • Migraines Mother    • No Known Problems Father    • Irritable bowel syndrome Sister    • Prostate cancer Maternal Grandfather 79   • Cancer Maternal Grandfather         Prostate   • No Known Problems Son    • No Known Problems Son    • Breast cancer Maternal Aunt 42   • No Known Problems Maternal Aunt    • No Known Problems Maternal Aunt    • No Known Problems Maternal Aunt    • Glaucoma Family    • Prostate cancer Family          Medications have been verified  Objective   Resp 16   Ht 5' 3" (1 6 m)   Wt 96 6 kg (213 lb)   BMI 37 73 kg/m²   No LMP recorded         Physical Exam     Physical Exam  Vitals and nursing note reviewed  Constitutional:       General: She is awake  Appearance: Normal appearance  She is well-developed and well-groomed  She is not ill-appearing, toxic-appearing or diaphoretic  HENT:      Head: Normocephalic and atraumatic  Right Ear: External ear normal       Left Ear: External ear normal    Eyes:      General: Lids are normal       Extraocular Movements: Extraocular movements intact  Musculoskeletal:      Comments: Patient has mild bruising and swelling at the right fifth digit  Sensation is grossly intact and equal to the contralateral side  Patient has full range of motion of the finger  She is tender to palpation over the DIP joint patient has increased excursion with radial collateral ligament testing concerning for sprain  Neurological:      Mental Status: She is alert  Psychiatric:         Behavior: Behavior is cooperative  Note: Portions of this record may have been created with voice recognition software  Occasional wrong word or "sound a like" substitutions may have occurred due to the inherent limitations of voice recognition software  Please read the chart carefully and recognize, using context, where substitutions have occurred  * 12-Dec-2022 03:12

## 2023-04-01 ENCOUNTER — NON-APPOINTMENT (OUTPATIENT)
Age: 88
End: 2023-04-01

## 2023-06-16 ENCOUNTER — NON-APPOINTMENT (OUTPATIENT)
Age: 88
End: 2023-06-16

## 2023-07-03 ENCOUNTER — OFFICE (OUTPATIENT)
Dept: URBAN - METROPOLITAN AREA CLINIC 115 | Facility: CLINIC | Age: 88
Setting detail: OPHTHALMOLOGY
End: 2023-07-03
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
## 2023-07-03 PROCEDURE — NO SHOW FE NO SHOW FEE: Performed by: OPHTHALMOLOGY

## 2023-07-10 NOTE — ED PROVIDER NOTE - IV ALTEPLASE EXCL ABS HIDDEN
Want to Say “Thank You” to a Nurse?  The CARYL Award® was created in memory of SEVERIANO Thorne by his family to say thank you to bedside nurses who provide an outstanding level of care.    Submit a nomination using any method below.     OR    [email or web address]  Or visit the Resource section   on your [employer] anne     
show

## 2023-07-15 ENCOUNTER — NON-APPOINTMENT (OUTPATIENT)
Age: 88
End: 2023-07-15

## 2023-07-26 ENCOUNTER — NON-APPOINTMENT (OUTPATIENT)
Age: 88
End: 2023-07-26

## 2023-07-28 ENCOUNTER — EMERGENCY (EMERGENCY)
Facility: HOSPITAL | Age: 88
LOS: 1 days | Discharge: LEFT BEFORE DISCHARGE | End: 2023-07-28
Attending: STUDENT IN AN ORGANIZED HEALTH CARE EDUCATION/TRAINING PROGRAM
Payer: COMMERCIAL

## 2023-07-28 VITALS
TEMPERATURE: 98 F | HEART RATE: 50 BPM | RESPIRATION RATE: 20 BRPM | DIASTOLIC BLOOD PRESSURE: 95 MMHG | OXYGEN SATURATION: 96 % | SYSTOLIC BLOOD PRESSURE: 150 MMHG | HEIGHT: 65 IN | WEIGHT: 146.61 LBS

## 2023-07-28 VITALS
HEART RATE: 53 BPM | RESPIRATION RATE: 18 BRPM | SYSTOLIC BLOOD PRESSURE: 150 MMHG | DIASTOLIC BLOOD PRESSURE: 66 MMHG | OXYGEN SATURATION: 97 %

## 2023-07-28 DIAGNOSIS — Z95.5 PRESENCE OF CORONARY ANGIOPLASTY IMPLANT AND GRAFT: Chronic | ICD-10-CM

## 2023-07-28 DIAGNOSIS — Z95.1 PRESENCE OF AORTOCORONARY BYPASS GRAFT: Chronic | ICD-10-CM

## 2023-07-28 LAB
ALBUMIN SERPL ELPH-MCNC: 3.6 G/DL — SIGNIFICANT CHANGE UP (ref 3.3–5.2)
ALP SERPL-CCNC: 106 U/L — SIGNIFICANT CHANGE UP (ref 40–120)
ALT FLD-CCNC: 55 U/L — HIGH
ANION GAP SERPL CALC-SCNC: 11 MMOL/L — SIGNIFICANT CHANGE UP (ref 5–17)
APPEARANCE UR: CLEAR — SIGNIFICANT CHANGE UP
AST SERPL-CCNC: 50 U/L — HIGH
BACTERIA # UR AUTO: ABNORMAL
BASOPHILS # BLD AUTO: 0.03 K/UL — SIGNIFICANT CHANGE UP (ref 0–0.2)
BASOPHILS NFR BLD AUTO: 0.4 % — SIGNIFICANT CHANGE UP (ref 0–2)
BILIRUB SERPL-MCNC: 0.6 MG/DL — SIGNIFICANT CHANGE UP (ref 0.4–2)
BILIRUB UR-MCNC: NEGATIVE — SIGNIFICANT CHANGE UP
BUN SERPL-MCNC: 33.8 MG/DL — HIGH (ref 8–20)
CALCIUM SERPL-MCNC: 9.1 MG/DL — SIGNIFICANT CHANGE UP (ref 8.4–10.5)
CHLORIDE SERPL-SCNC: 97 MMOL/L — SIGNIFICANT CHANGE UP (ref 96–108)
CO2 SERPL-SCNC: 25 MMOL/L — SIGNIFICANT CHANGE UP (ref 22–29)
COLOR SPEC: YELLOW — SIGNIFICANT CHANGE UP
CREAT SERPL-MCNC: 1.67 MG/DL — HIGH (ref 0.5–1.3)
DIFF PNL FLD: NEGATIVE — SIGNIFICANT CHANGE UP
EGFR: 38 ML/MIN/1.73M2 — LOW
EOSINOPHIL # BLD AUTO: 0.09 K/UL — SIGNIFICANT CHANGE UP (ref 0–0.5)
EOSINOPHIL NFR BLD AUTO: 1.3 % — SIGNIFICANT CHANGE UP (ref 0–6)
EPI CELLS # UR: SIGNIFICANT CHANGE UP
GLUCOSE SERPL-MCNC: 126 MG/DL — HIGH (ref 70–99)
GLUCOSE UR QL: NEGATIVE MG/DL — SIGNIFICANT CHANGE UP
HCT VFR BLD CALC: 39.3 % — SIGNIFICANT CHANGE UP (ref 39–50)
HGB BLD-MCNC: 12.9 G/DL — LOW (ref 13–17)
IMM GRANULOCYTES NFR BLD AUTO: 0.1 % — SIGNIFICANT CHANGE UP (ref 0–0.9)
KETONES UR-MCNC: NEGATIVE — SIGNIFICANT CHANGE UP
LEUKOCYTE ESTERASE UR-ACNC: ABNORMAL
LIDOCAIN IGE QN: 46 U/L — SIGNIFICANT CHANGE UP (ref 22–51)
LYMPHOCYTES # BLD AUTO: 2.63 K/UL — SIGNIFICANT CHANGE UP (ref 1–3.3)
LYMPHOCYTES # BLD AUTO: 36.5 % — SIGNIFICANT CHANGE UP (ref 13–44)
MAGNESIUM SERPL-MCNC: 2.3 MG/DL — SIGNIFICANT CHANGE UP (ref 1.6–2.6)
MCHC RBC-ENTMCNC: 30.2 PG — SIGNIFICANT CHANGE UP (ref 27–34)
MCHC RBC-ENTMCNC: 32.8 GM/DL — SIGNIFICANT CHANGE UP (ref 32–36)
MCV RBC AUTO: 92 FL — SIGNIFICANT CHANGE UP (ref 80–100)
MONOCYTES # BLD AUTO: 0.62 K/UL — SIGNIFICANT CHANGE UP (ref 0–0.9)
MONOCYTES NFR BLD AUTO: 8.6 % — SIGNIFICANT CHANGE UP (ref 2–14)
NEUTROPHILS # BLD AUTO: 3.82 K/UL — SIGNIFICANT CHANGE UP (ref 1.8–7.4)
NEUTROPHILS NFR BLD AUTO: 53.1 % — SIGNIFICANT CHANGE UP (ref 43–77)
NITRITE UR-MCNC: NEGATIVE — SIGNIFICANT CHANGE UP
NT-PROBNP SERPL-SCNC: 8181 PG/ML — HIGH (ref 0–300)
PH UR: 7 — SIGNIFICANT CHANGE UP (ref 5–8)
PLATELET # BLD AUTO: 162 K/UL — SIGNIFICANT CHANGE UP (ref 150–400)
POTASSIUM SERPL-MCNC: 5.4 MMOL/L — HIGH (ref 3.5–5.3)
POTASSIUM SERPL-SCNC: 5.4 MMOL/L — HIGH (ref 3.5–5.3)
PROT SERPL-MCNC: 6.4 G/DL — LOW (ref 6.6–8.7)
PROT UR-MCNC: 15
RBC # BLD: 4.27 M/UL — SIGNIFICANT CHANGE UP (ref 4.2–5.8)
RBC # FLD: 14.2 % — SIGNIFICANT CHANGE UP (ref 10.3–14.5)
RBC CASTS # UR COMP ASSIST: SIGNIFICANT CHANGE UP /HPF (ref 0–4)
SODIUM SERPL-SCNC: 133 MMOL/L — LOW (ref 135–145)
SP GR SPEC: 1 — LOW (ref 1.01–1.02)
TROPONIN T SERPL-MCNC: 0.01 NG/ML — SIGNIFICANT CHANGE UP (ref 0–0.06)
TROPONIN T SERPL-MCNC: 0.01 NG/ML — SIGNIFICANT CHANGE UP (ref 0–0.06)
UROBILINOGEN FLD QL: NEGATIVE MG/DL — SIGNIFICANT CHANGE UP
WBC # BLD: 7.2 K/UL — SIGNIFICANT CHANGE UP (ref 3.8–10.5)
WBC # FLD AUTO: 7.2 K/UL — SIGNIFICANT CHANGE UP (ref 3.8–10.5)
WBC UR QL: SIGNIFICANT CHANGE UP /HPF (ref 0–5)

## 2023-07-28 PROCEDURE — 71045 X-RAY EXAM CHEST 1 VIEW: CPT | Mod: 26

## 2023-07-28 PROCEDURE — 93010 ELECTROCARDIOGRAM REPORT: CPT

## 2023-07-28 PROCEDURE — 74176 CT ABD & PELVIS W/O CONTRAST: CPT | Mod: 26,MA

## 2023-07-28 PROCEDURE — 99285 EMERGENCY DEPT VISIT HI MDM: CPT

## 2023-07-28 PROCEDURE — 83880 ASSAY OF NATRIURETIC PEPTIDE: CPT

## 2023-07-28 PROCEDURE — 93005 ELECTROCARDIOGRAM TRACING: CPT

## 2023-07-28 PROCEDURE — 87086 URINE CULTURE/COLONY COUNT: CPT

## 2023-07-28 PROCEDURE — 99285 EMERGENCY DEPT VISIT HI MDM: CPT | Mod: 25

## 2023-07-28 PROCEDURE — 36415 COLL VENOUS BLD VENIPUNCTURE: CPT

## 2023-07-28 PROCEDURE — 71045 X-RAY EXAM CHEST 1 VIEW: CPT

## 2023-07-28 PROCEDURE — 96375 TX/PRO/DX INJ NEW DRUG ADDON: CPT

## 2023-07-28 PROCEDURE — 85025 COMPLETE CBC W/AUTO DIFF WBC: CPT

## 2023-07-28 PROCEDURE — 81001 URINALYSIS AUTO W/SCOPE: CPT

## 2023-07-28 PROCEDURE — 84484 ASSAY OF TROPONIN QUANT: CPT

## 2023-07-28 PROCEDURE — 83735 ASSAY OF MAGNESIUM: CPT

## 2023-07-28 PROCEDURE — 96374 THER/PROPH/DIAG INJ IV PUSH: CPT

## 2023-07-28 PROCEDURE — 74176 CT ABD & PELVIS W/O CONTRAST: CPT | Mod: MA

## 2023-07-28 PROCEDURE — 83690 ASSAY OF LIPASE: CPT

## 2023-07-28 PROCEDURE — 80053 COMPREHEN METABOLIC PANEL: CPT

## 2023-07-28 RX ORDER — ONDANSETRON 8 MG/1
4 TABLET, FILM COATED ORAL ONCE
Refills: 0 | Status: COMPLETED | OUTPATIENT
Start: 2023-07-28 | End: 2023-07-28

## 2023-07-28 RX ORDER — AMPICILLIN SODIUM AND SULBACTAM SODIUM 250; 125 MG/ML; MG/ML
1.5 INJECTION, POWDER, FOR SUSPENSION INTRAMUSCULAR; INTRAVENOUS ONCE
Refills: 0 | Status: COMPLETED | OUTPATIENT
Start: 2023-07-28 | End: 2023-07-28

## 2023-07-28 RX ADMIN — AMPICILLIN SODIUM AND SULBACTAM SODIUM 200 GRAM(S): 250; 125 INJECTION, POWDER, FOR SUSPENSION INTRAMUSCULAR; INTRAVENOUS at 13:56

## 2023-07-28 RX ADMIN — ONDANSETRON 4 MILLIGRAM(S): 8 TABLET, FILM COATED ORAL at 09:28

## 2023-07-28 NOTE — ED PROVIDER NOTE - ATTENDING APP SHARED VISIT CONTRIBUTION OF CARE
89M with abdominal discomfort, evaluated and worked up, found to have chris / diverticulitis, also has new diagnosis of atrial flutter; recommended patient stay for further eval with EP / admit given new dx, patient unagreeable to staying, wants to go home on antibiotics; discussed this could put him at risk of death or permanent disability, patient displays insight and judgement ,still wants to leave, strict return precautions advised and patient states he will come back if needed.

## 2023-07-28 NOTE — ED PROVIDER NOTE - OBJECTIVE STATEMENT
A 89 yo M with PMH of CAD s/p CABG, s/p PCI, DM, CAD, HTN, GERD, OA, TAA, Carotid Stenosis, HFrEF, HLD and Glaucoma, presents to the ED c/o chest pain and leg weakness. Reports chest pain and nausea worsened this morning. Also report abdominal tenderness. Denies fevers, chills, headache, palpitations, shortness of breath, cough, vomiting, diarrhea, hematuria, dysuria, dark stools.

## 2023-07-28 NOTE — ED PROVIDER NOTE - PATIENT PORTAL LINK FT
You can access the FollowMyHealth Patient Portal offered by [facility] by registering at the following website: [email or web address]. By joining [employer]’s FollowMyHealth portal, you will also be able to view your health information using other applications (apps) compatible with our system.

## 2023-07-28 NOTE — ED ADULT TRIAGE NOTE - CHIEF COMPLAINT QUOTE
Patient arrived to ED today with c/o upper abdominal pains, patient reports he was diagnosed with GERD recently and the medication he was given is not working.

## 2023-07-28 NOTE — CONSULT NOTE ADULT - SUBJECTIVE AND OBJECTIVE BOX
HPI:  A 89 yo M with PMH of CAD s/p CABG, s/p PCI, DM, HTN, GERD, OA, TAA, Carotid Stenosis, HFrEF, HLD and Glaucoma, presents to the ED c/o stomach pain and leg weakness. Reports stomach pain and nausea worsened this morning. Also report abdominal tenderness. Denies fevers, chills, headache, palpitations, shortness of breath, cough, vomiting, diarrhea, hematuria, dysuria, dark stools.  Patient was uncooperative during my evaluation, insisting he wants to leave.     PAST MEDICAL & SURGICAL HISTORY:  High cholesterol      Diabetes      MI, old      HTN (hypertension)      GERD (gastroesophageal reflux disease)      Arthritis      S/P CABG (coronary artery bypass graft)      History of coronary artery stent placement              Allergies    No Known Allergies    Intolerances        FAMILY HISTORY:  FH: hypertension        SOCIAL HISTORY:    CIGARETTES: denies    ALCOHOL: denies    REVIEW OF SYSTEMS:  Unremarkable except as in HPI    ALLERY AND IMMUNOLOGIC: No hives or eczema	    Vital Signs Last 24 Hrs  T(C): 36.6 (2023 13:36), Max: 36.6 (2023 13:36)  T(F): 97.9 (2023 13:36), Max: 97.9 (2023 13:36)  HR: 53 (2023 15:44) (50 - 54)  BP: 150/66 (2023 15:44) (150/66 - 163/70)    RR: 18 (2023 15:44) (18 - 20)  SpO2: 97% (2023 15:44) (96% - 99%)    Parameters below as of 2023 15:44  Patient On (Oxygen Delivery Method): room air        Daily Height in cm: 165.1 (2023 07:59)    Daily     I&O's Detail      PHYSICAL EXAM:  Appearance: No distress  HEENT:   Normal oral mucosa, PERRL, EOMI, sclera non-icteric	  Lymphatic: No cervical lymphadenopathy  Cardiovascular: Normal S1 S2, No JVD, No cardiac murmurs, No carotid bruits, 2+ pitting LE edema  Respiratory: Lungs clear to auscultation	  Psychiatry: A & O x 3, Mood & affect appropriate  Gastrointestinal:  Soft, Non-tender, + BS, no bruits	  Skin: No rashes, No ecchymoses, No cyanosis  Neurologic: Grossly non-focal with full strength in all four extremities  Extremities: Normal range of motion, No clubbing, cyanosis or edema  Vascular: Peripheral pulses palpable 2+ bilaterally      INTERPRETATION OF TELEMETRY: Atrial flutter    ECG: Atrial lflutter    LABS:                        12.9   7.20  )-----------( 162      ( 2023 09:10 )             39.3     -    133<L>  |  97  |  33.8<H>  ----------------------------<  126<H>  5.4<H>   |  25.0  |  1.67<H>    Ca    9.1      2023 09:10  Mg     2.3         TPro  6.4<L>  /  Alb  3.6  /  TBili  0.6  /  DBili  x   /  AST  50<H>  /  ALT  55<H>  /  AlkPhos  106  -    CARDIAC MARKERS ( 2023 15:55 )  x     / 0.01 ng/mL / x     / x     / x      CARDIAC MARKERS ( 2023 09:10 )  x     / 0.01 ng/mL / x     / x     / x            Urinalysis Basic - ( 2023 09:45 )    Color: Yellow / Appearance: Clear / S.005 / pH: x  Gluc: x / Ketone: Negative  / Bili: Negative / Urobili: Negative mg/dL   Blood: x / Protein: 15 / Nitrite: Negative   Leuk Esterase: Trace / RBC: 0-2 /HPF / WBC 0-2 /HPF   Sq Epi: x / Non Sq Epi: x / Bacteria: Occasional      I&O's Summary    BNP  RADIOLOGY & ADDITIONAL STUDIES:

## 2023-07-28 NOTE — ED PROVIDER NOTE - CLINICAL SUMMARY MEDICAL DECISION MAKING FREE TEXT BOX
A 89 yo M with PMH of CAD s/p CABG, s/p PCI, DM, CAD, HTN, GERD, OA, TAA, Carotid Stenosis, HFrEF, HLD and Glaucoma, presents to the ED c/o chest pain and leg weakness. Reports chest pain and nausea worsened this morning. Also report abdominal tenderness. Denies fevers, chills, headache, palpitations, shortness of breath, cough, vomiting, diarrhea, hematuria, dysuria, dark stools.  Will check cbc, cmp, trop T x2, Cards consult, CT abd, urine. Given Unasyn for diverticulitis. Reassess.

## 2023-07-28 NOTE — ED ADULT NURSE NOTE - NSFALLHARMRISKINTERV_ED_ALL_ED

## 2023-07-28 NOTE — CONSULT NOTE ADULT - ASSESSMENT
- Epigastric pain: no sign of ACS.    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    - JAMARI with hyperkalemia   - HTN   - atrial flutter: apparently new    Recommendations  Due to hyperkalemia, would replace losartan with Imdur 60mg daily  Needs long term AC for stroke prevention. Would start Eliquis 2.5mg PO BID  Nephrology evaluation for JAMARI  Would not resume home metoprolol due to bradycardia on presentation  Echocardiogram

## 2023-07-28 NOTE — ED PROVIDER NOTE - PHYSICAL EXAMINATION
VITALS: reviewed  GEN: No apparent distress, A & O x 4  HEAD/EYES: NC/AT, PERRL, EOMI, anicteric sclerae, no conjunctival pallor  ENT: mucus membranes moist, trachea midline, no JVD, neck is supple  RESP: lungs CTA with equal breath sounds bilaterally, chest wall nontender and atraumatic  CV: heart with reg rhythm S1, S2, no murmur; distal pulses intact and symmetric bilaterally  ABDOMEN: normoactive bowel sounds, soft, nondistended, +ttp on umbilical, lower abdomen, no palpable masses  : no CVAT  MSK: extremities atraumatic and nontender, no edema, no asymmetry. the back is without midline or lateral tenderness, there is no spinal deformity or stepoff and the back is ranged painlessly. the neck has no midline tenderness, deformity, or stepoff, and is ranged painlessly.  SKIN: warm, dry, no rash, no bruising, no cyanosis.  NEURO: alert, mentating appropriately, no facial asymmetry. gross sensation, motor, coordination are intact  PSYCH: Affect appropriate

## 2023-07-28 NOTE — ED ADULT NURSE NOTE - OBJECTIVE STATEMENT
Pt A&O x 4 hx of CHF and GERD c/o intermittent chest pain starting last night. Pt reports pain as mild. Airway patent. Respirations even and unlabored. No JVD or diaphoresis. 2+ BLLE edema. Pt on telemonitor with pulse oximetry. Bed locked and in lowest position. Nonslip footwear. Frequent checks made.

## 2023-07-28 NOTE — ED PROVIDER NOTE - CARE PLAN
Principal Discharge DX:	JAMARI (acute kidney injury)  Secondary Diagnosis:	Atrial flutter  Secondary Diagnosis:	Acute diverticulitis   1

## 2023-07-28 NOTE — ED PROVIDER NOTE - PROGRESS NOTE DETAILS
The patient has decided to leave against medical advice.  We discussed all risks, benefits, and alternatives to the progression of treatment and the potential dangers of leaving including but not limited to permanent disability, injury, and death.  The patient was instructed that they are welcome to change their decision to leave against medical advice and return to the emergency department at any time and for any reason in order to allow us to render care.

## 2023-07-29 ENCOUNTER — NON-APPOINTMENT (OUTPATIENT)
Age: 88
End: 2023-07-29

## 2023-07-30 ENCOUNTER — INPATIENT (INPATIENT)
Facility: HOSPITAL | Age: 88
LOS: 1 days | Discharge: HOME | DRG: 228 | End: 2023-08-01
Attending: STUDENT IN AN ORGANIZED HEALTH CARE EDUCATION/TRAINING PROGRAM | Admitting: STUDENT IN AN ORGANIZED HEALTH CARE EDUCATION/TRAINING PROGRAM
Payer: COMMERCIAL

## 2023-07-30 VITALS
HEART RATE: 32 BPM | OXYGEN SATURATION: 96 % | HEIGHT: 65 IN | DIASTOLIC BLOOD PRESSURE: 47 MMHG | RESPIRATION RATE: 14 BRPM | SYSTOLIC BLOOD PRESSURE: 108 MMHG

## 2023-07-30 DIAGNOSIS — R00.1 BRADYCARDIA, UNSPECIFIED: ICD-10-CM

## 2023-07-30 DIAGNOSIS — I44.39 OTHER ATRIOVENTRICULAR BLOCK: ICD-10-CM

## 2023-07-30 DIAGNOSIS — I50.20 UNSPECIFIED SYSTOLIC (CONGESTIVE) HEART FAILURE: ICD-10-CM

## 2023-07-30 DIAGNOSIS — Z95.5 PRESENCE OF CORONARY ANGIOPLASTY IMPLANT AND GRAFT: Chronic | ICD-10-CM

## 2023-07-30 DIAGNOSIS — Z95.1 PRESENCE OF AORTOCORONARY BYPASS GRAFT: Chronic | ICD-10-CM

## 2023-07-30 LAB
ALBUMIN SERPL ELPH-MCNC: 3.6 G/DL — SIGNIFICANT CHANGE UP (ref 3.3–5.2)
ALBUMIN SERPL ELPH-MCNC: 4.1 G/DL — SIGNIFICANT CHANGE UP (ref 3.3–5.2)
ALP SERPL-CCNC: 106 U/L — SIGNIFICANT CHANGE UP (ref 40–120)
ALP SERPL-CCNC: 74 U/L — SIGNIFICANT CHANGE UP (ref 40–120)
ALT FLD-CCNC: 33 U/L — SIGNIFICANT CHANGE UP
ALT FLD-CCNC: 33 U/L — SIGNIFICANT CHANGE UP
ANION GAP SERPL CALC-SCNC: 11 MMOL/L — SIGNIFICANT CHANGE UP (ref 5–17)
ANION GAP SERPL CALC-SCNC: 11 MMOL/L — SIGNIFICANT CHANGE UP (ref 5–17)
ANION GAP SERPL CALC-SCNC: 13 MMOL/L — SIGNIFICANT CHANGE UP (ref 5–17)
APTT BLD: 24.9 SEC — SIGNIFICANT CHANGE UP (ref 24.5–35.6)
APTT BLD: 29.3 SEC — SIGNIFICANT CHANGE UP (ref 24.5–35.6)
AST SERPL-CCNC: 25 U/L — SIGNIFICANT CHANGE UP
AST SERPL-CCNC: 40 U/L — HIGH
BASOPHILS # BLD AUTO: 0.02 K/UL — SIGNIFICANT CHANGE UP (ref 0–0.2)
BASOPHILS NFR BLD AUTO: 0.4 % — SIGNIFICANT CHANGE UP (ref 0–2)
BILIRUB SERPL-MCNC: 0.7 MG/DL — SIGNIFICANT CHANGE UP (ref 0.4–2)
BILIRUB SERPL-MCNC: 1 MG/DL — SIGNIFICANT CHANGE UP (ref 0.4–2)
BLD GP AB SCN SERPL QL: SIGNIFICANT CHANGE UP
BUN SERPL-MCNC: 30.1 MG/DL — HIGH (ref 8–20)
BUN SERPL-MCNC: 30.2 MG/DL — HIGH (ref 8–20)
BUN SERPL-MCNC: 32.6 MG/DL — HIGH (ref 8–20)
CALCIUM SERPL-MCNC: 8.2 MG/DL — LOW (ref 8.4–10.5)
CALCIUM SERPL-MCNC: 8.9 MG/DL — SIGNIFICANT CHANGE UP (ref 8.4–10.5)
CALCIUM SERPL-MCNC: 9.3 MG/DL — SIGNIFICANT CHANGE UP (ref 8.4–10.5)
CHLORIDE SERPL-SCNC: 100 MMOL/L — SIGNIFICANT CHANGE UP (ref 96–108)
CHLORIDE SERPL-SCNC: 104 MMOL/L — SIGNIFICANT CHANGE UP (ref 96–108)
CHLORIDE SERPL-SCNC: 99 MMOL/L — SIGNIFICANT CHANGE UP (ref 96–108)
CK SERPL-CCNC: 99 U/L — SIGNIFICANT CHANGE UP (ref 30–200)
CO2 SERPL-SCNC: 20 MMOL/L — LOW (ref 22–29)
CO2 SERPL-SCNC: 21 MMOL/L — LOW (ref 22–29)
CO2 SERPL-SCNC: 22 MMOL/L — SIGNIFICANT CHANGE UP (ref 22–29)
CREAT SERPL-MCNC: 1.22 MG/DL — SIGNIFICANT CHANGE UP (ref 0.5–1.3)
CREAT SERPL-MCNC: 1.26 MG/DL — SIGNIFICANT CHANGE UP (ref 0.5–1.3)
CREAT SERPL-MCNC: 1.54 MG/DL — HIGH (ref 0.5–1.3)
EGFR: 42 ML/MIN/1.73M2 — LOW
EGFR: 54 ML/MIN/1.73M2 — LOW
EGFR: 56 ML/MIN/1.73M2 — LOW
EOSINOPHIL # BLD AUTO: 0.06 K/UL — SIGNIFICANT CHANGE UP (ref 0–0.5)
EOSINOPHIL NFR BLD AUTO: 1.1 % — SIGNIFICANT CHANGE UP (ref 0–6)
GAS PNL BLDA: SIGNIFICANT CHANGE UP
GAS PNL BLDA: SIGNIFICANT CHANGE UP
GLUCOSE BLDC GLUCOMTR-MCNC: 103 MG/DL — HIGH (ref 70–99)
GLUCOSE SERPL-MCNC: 105 MG/DL — HIGH (ref 70–99)
GLUCOSE SERPL-MCNC: 120 MG/DL — HIGH (ref 70–99)
GLUCOSE SERPL-MCNC: 126 MG/DL — HIGH (ref 70–99)
HCT VFR BLD CALC: 40.9 % — SIGNIFICANT CHANGE UP (ref 39–50)
HCT VFR BLD CALC: 49.5 % — SIGNIFICANT CHANGE UP (ref 39–50)
HGB BLD-MCNC: 13.4 G/DL — SIGNIFICANT CHANGE UP (ref 13–17)
HGB BLD-MCNC: 16.1 G/DL — SIGNIFICANT CHANGE UP (ref 13–17)
IMM GRANULOCYTES NFR BLD AUTO: 0.2 % — SIGNIFICANT CHANGE UP (ref 0–0.9)
INR BLD: 1.13 RATIO — SIGNIFICANT CHANGE UP (ref 0.85–1.18)
INR BLD: 1.35 RATIO — HIGH (ref 0.85–1.18)
LYMPHOCYTES # BLD AUTO: 1.72 K/UL — SIGNIFICANT CHANGE UP (ref 1–3.3)
LYMPHOCYTES # BLD AUTO: 32 % — SIGNIFICANT CHANGE UP (ref 13–44)
MAGNESIUM SERPL-MCNC: 2.1 MG/DL — SIGNIFICANT CHANGE UP (ref 1.6–2.6)
MAGNESIUM SERPL-MCNC: 2.1 MG/DL — SIGNIFICANT CHANGE UP (ref 1.8–2.6)
MCHC RBC-ENTMCNC: 30.4 PG — SIGNIFICANT CHANGE UP (ref 27–34)
MCHC RBC-ENTMCNC: 30.6 PG — SIGNIFICANT CHANGE UP (ref 27–34)
MCHC RBC-ENTMCNC: 32.5 GM/DL — SIGNIFICANT CHANGE UP (ref 32–36)
MCHC RBC-ENTMCNC: 32.8 GM/DL — SIGNIFICANT CHANGE UP (ref 32–36)
MCV RBC AUTO: 93.4 FL — SIGNIFICANT CHANGE UP (ref 80–100)
MCV RBC AUTO: 93.4 FL — SIGNIFICANT CHANGE UP (ref 80–100)
MONOCYTES # BLD AUTO: 0.52 K/UL — SIGNIFICANT CHANGE UP (ref 0–0.9)
MONOCYTES NFR BLD AUTO: 9.7 % — SIGNIFICANT CHANGE UP (ref 2–14)
NEUTROPHILS # BLD AUTO: 3.05 K/UL — SIGNIFICANT CHANGE UP (ref 1.8–7.4)
NEUTROPHILS NFR BLD AUTO: 56.6 % — SIGNIFICANT CHANGE UP (ref 43–77)
NT-PROBNP SERPL-SCNC: 6641 PG/ML — HIGH (ref 0–300)
PHOSPHATE SERPL-MCNC: 4.2 MG/DL — SIGNIFICANT CHANGE UP (ref 2.4–4.7)
PLATELET # BLD AUTO: 147 K/UL — LOW (ref 150–400)
PLATELET # BLD AUTO: 211 K/UL — SIGNIFICANT CHANGE UP (ref 150–400)
POTASSIUM SERPL-MCNC: 5.1 MMOL/L — SIGNIFICANT CHANGE UP (ref 3.5–5.3)
POTASSIUM SERPL-MCNC: 5.6 MMOL/L — HIGH (ref 3.5–5.3)
POTASSIUM SERPL-MCNC: 6 MMOL/L — HIGH (ref 3.5–5.3)
POTASSIUM SERPL-SCNC: 5.1 MMOL/L — SIGNIFICANT CHANGE UP (ref 3.5–5.3)
POTASSIUM SERPL-SCNC: 5.6 MMOL/L — HIGH (ref 3.5–5.3)
POTASSIUM SERPL-SCNC: 6 MMOL/L — HIGH (ref 3.5–5.3)
PROT SERPL-MCNC: 6.6 G/DL — SIGNIFICANT CHANGE UP (ref 6.6–8.7)
PROT SERPL-MCNC: 7.7 G/DL — SIGNIFICANT CHANGE UP (ref 6.6–8.7)
PROTHROM AB SERPL-ACNC: 12.5 SEC — SIGNIFICANT CHANGE UP (ref 9.5–13)
PROTHROM AB SERPL-ACNC: 14.9 SEC — HIGH (ref 9.5–13)
RBC # BLD: 4.38 M/UL — SIGNIFICANT CHANGE UP (ref 4.2–5.8)
RBC # BLD: 5.3 M/UL — SIGNIFICANT CHANGE UP (ref 4.2–5.8)
RBC # FLD: 14.2 % — SIGNIFICANT CHANGE UP (ref 10.3–14.5)
RBC # FLD: 14.2 % — SIGNIFICANT CHANGE UP (ref 10.3–14.5)
SODIUM SERPL-SCNC: 132 MMOL/L — LOW (ref 135–145)
SODIUM SERPL-SCNC: 133 MMOL/L — LOW (ref 135–145)
SODIUM SERPL-SCNC: 135 MMOL/L — SIGNIFICANT CHANGE UP (ref 135–145)
TROPONIN T SERPL-MCNC: 0.02 NG/ML — SIGNIFICANT CHANGE UP (ref 0–0.06)
TROPONIN T SERPL-MCNC: 0.02 NG/ML — SIGNIFICANT CHANGE UP (ref 0–0.06)
WBC # BLD: 12.97 K/UL — HIGH (ref 3.8–10.5)
WBC # BLD: 5.48 K/UL — SIGNIFICANT CHANGE UP (ref 3.8–10.5)
WBC # FLD AUTO: 12.97 K/UL — HIGH (ref 3.8–10.5)
WBC # FLD AUTO: 5.48 K/UL — SIGNIFICANT CHANGE UP (ref 3.8–10.5)

## 2023-07-30 PROCEDURE — 99291 CRITICAL CARE FIRST HOUR: CPT

## 2023-07-30 PROCEDURE — 71045 X-RAY EXAM CHEST 1 VIEW: CPT | Mod: 26,76

## 2023-07-30 PROCEDURE — 93010 ELECTROCARDIOGRAM REPORT: CPT | Mod: 76

## 2023-07-30 PROCEDURE — 93355 ECHO TRANSESOPHAGEAL (TEE): CPT | Mod: 59

## 2023-07-30 PROCEDURE — 99223 1ST HOSP IP/OBS HIGH 75: CPT

## 2023-07-30 RX ORDER — HYDRALAZINE HCL 50 MG
10 TABLET ORAL EVERY 6 HOURS
Refills: 0 | Status: DISCONTINUED | OUTPATIENT
Start: 2023-07-30 | End: 2023-08-01

## 2023-07-30 RX ORDER — MORPHINE SULFATE 50 MG/1
2 CAPSULE, EXTENDED RELEASE ORAL ONCE
Refills: 0 | Status: DISCONTINUED | OUTPATIENT
Start: 2023-07-30 | End: 2023-07-30

## 2023-07-30 RX ORDER — HEPARIN SODIUM 5000 [USP'U]/ML
5000 INJECTION INTRAVENOUS; SUBCUTANEOUS EVERY 8 HOURS
Refills: 0 | Status: DISCONTINUED | OUTPATIENT
Start: 2023-07-30 | End: 2023-07-31

## 2023-07-30 RX ORDER — FUROSEMIDE 40 MG
40 TABLET ORAL ONCE
Refills: 0 | Status: DISCONTINUED | OUTPATIENT
Start: 2023-07-30 | End: 2023-07-30

## 2023-07-30 RX ORDER — PANTOPRAZOLE SODIUM 20 MG/1
40 TABLET, DELAYED RELEASE ORAL
Refills: 0 | Status: DISCONTINUED | OUTPATIENT
Start: 2023-07-30 | End: 2023-08-01

## 2023-07-30 RX ORDER — FUROSEMIDE 40 MG
40 TABLET ORAL ONCE
Refills: 0 | Status: COMPLETED | OUTPATIENT
Start: 2023-07-30 | End: 2023-07-30

## 2023-07-30 RX ORDER — CHLORHEXIDINE GLUCONATE 213 G/1000ML
1 SOLUTION TOPICAL
Refills: 0 | Status: DISCONTINUED | OUTPATIENT
Start: 2023-07-30 | End: 2023-08-01

## 2023-07-30 RX ORDER — CLOPIDOGREL BISULFATE 75 MG/1
75 TABLET, FILM COATED ORAL DAILY
Refills: 0 | Status: DISCONTINUED | OUTPATIENT
Start: 2023-07-30 | End: 2023-08-01

## 2023-07-30 RX ORDER — INSULIN LISPRO 100/ML
VIAL (ML) SUBCUTANEOUS
Refills: 0 | Status: DISCONTINUED | OUTPATIENT
Start: 2023-07-30 | End: 2023-08-01

## 2023-07-30 RX ORDER — BRIMONIDINE TARTRATE 2 MG/MG
1 SOLUTION/ DROPS OPHTHALMIC
Refills: 0 | Status: DISCONTINUED | OUTPATIENT
Start: 2023-07-30 | End: 2023-08-01

## 2023-07-30 RX ORDER — ASPIRIN/CALCIUM CARB/MAGNESIUM 324 MG
325 TABLET ORAL ONCE
Refills: 0 | Status: COMPLETED | OUTPATIENT
Start: 2023-07-30 | End: 2023-07-30

## 2023-07-30 RX ORDER — ASPIRIN/CALCIUM CARB/MAGNESIUM 324 MG
81 TABLET ORAL DAILY
Refills: 0 | Status: DISCONTINUED | OUTPATIENT
Start: 2023-07-30 | End: 2023-07-31

## 2023-07-30 RX ORDER — POLYETHYLENE GLYCOL 3350 17 G/17G
17 POWDER, FOR SOLUTION ORAL DAILY
Refills: 0 | Status: DISCONTINUED | OUTPATIENT
Start: 2023-07-30 | End: 2023-08-01

## 2023-07-30 RX ORDER — MORPHINE SULFATE 50 MG/1
2 CAPSULE, EXTENDED RELEASE ORAL EVERY 4 HOURS
Refills: 0 | Status: DISCONTINUED | OUTPATIENT
Start: 2023-07-30 | End: 2023-07-31

## 2023-07-30 RX ORDER — INSULIN LISPRO 100/ML
VIAL (ML) SUBCUTANEOUS AT BEDTIME
Refills: 0 | Status: DISCONTINUED | OUTPATIENT
Start: 2023-07-30 | End: 2023-08-01

## 2023-07-30 RX ORDER — ATORVASTATIN CALCIUM 80 MG/1
80 TABLET, FILM COATED ORAL AT BEDTIME
Refills: 0 | Status: DISCONTINUED | OUTPATIENT
Start: 2023-07-30 | End: 2023-08-01

## 2023-07-30 RX ORDER — DOPAMINE HYDROCHLORIDE 40 MG/ML
5 INJECTION, SOLUTION, CONCENTRATE INTRAVENOUS
Qty: 400 | Refills: 0 | Status: DISCONTINUED | OUTPATIENT
Start: 2023-07-30 | End: 2023-07-31

## 2023-07-30 RX ORDER — DOPAMINE HYDROCHLORIDE 40 MG/ML
5 INJECTION, SOLUTION, CONCENTRATE INTRAVENOUS
Qty: 400 | Refills: 0 | Status: DISCONTINUED | OUTPATIENT
Start: 2023-07-30 | End: 2023-07-30

## 2023-07-30 RX ADMIN — MORPHINE SULFATE 2 MILLIGRAM(S): 50 CAPSULE, EXTENDED RELEASE ORAL at 20:00

## 2023-07-30 RX ADMIN — MORPHINE SULFATE 2 MILLIGRAM(S): 50 CAPSULE, EXTENDED RELEASE ORAL at 19:45

## 2023-07-30 RX ADMIN — Medication 10 MILLIGRAM(S): at 20:01

## 2023-07-30 RX ADMIN — MORPHINE SULFATE 2 MILLIGRAM(S): 50 CAPSULE, EXTENDED RELEASE ORAL at 19:30

## 2023-07-30 RX ADMIN — DOPAMINE HYDROCHLORIDE 11.5 MICROGRAM(S)/KG/MIN: 40 INJECTION, SOLUTION, CONCENTRATE INTRAVENOUS at 17:53

## 2023-07-30 RX ADMIN — HEPARIN SODIUM 5000 UNIT(S): 5000 INJECTION INTRAVENOUS; SUBCUTANEOUS at 22:56

## 2023-07-30 RX ADMIN — Medication 40 MILLIGRAM(S): at 20:00

## 2023-07-30 NOTE — PROGRESS NOTE ADULT - SUBJECTIVE AND OBJECTIVE BOX
Called urgently to bedside, patient endorsing chest pain and that he can't breath.  Stat ekg/labs ordered.  Upon arrival to patient's bedside, he appeared to be in extremis, thrashing in bed, unable to get comfortable, desatturating.  HR in the 60s, bp 140s, dopamine gtt turned off, bedside pocus performed +diffuse b-lines b/l, LVEF appears reduced, possible wall motion abnormality.  Patient ordered for lasix ivp, morphine ivp and placed on NRB, patient with accessory muscle use, endorsing he cannot breath, placed on NIPPV. EKG without significant ischemic changes, but found to be in aflutter.  Now hypertensive 170s-190s, ordered for hydralazine ivp.  avoiding tariq blocking agents in setting of bradycardia upon presentation.      Additional Critical Care time: 60 mins assessing presenting problems of acute illness that poses high probability of life threatening deterioration or end organ damage/dysfunction.  Medical decision making including Initiating plan of care, reviewing data, reviewing radiology, discussing with multidisciplinary team, non inclusive of procedures, discussing goals of care with patient/family

## 2023-07-30 NOTE — ED ADULT TRIAGE NOTE - CHIEF COMPLAINT QUOTE
PT BIBA from urgent care for bradycardia. Found at U/C HR 25. Given 1mg atropine in route. PT asymptomatic at this time. Per EMS took a dose of metoprolol this am.

## 2023-07-30 NOTE — CONSULT NOTE ADULT - ASSESSMENT
89 y/o male with hx of CAD s/p CABG, s/p PCI, DM, HTN, carotid stenosis, HFrEF, HLD, BIBEMS from Urgent Care presenting with bradycardia. Patient was given atropine 1 mg x2 prior to arrival to ED. Patient cannot remember if he took his metoprolol this morning.

## 2023-07-30 NOTE — ED PROVIDER NOTE - PHYSICAL EXAMINATION
General: Well appearing in no acute distress, alert and cooperative  Head: Normocephalic, atraumatic  Eyes: PERRLA, no conjunctival injection, no scleral icterus, EOMI  ENMT: Atraumatic external nose and ears  Neck: Soft and supple, trachea midline   Cardiac: Regular rate and regular rhythm, no murmurs, peripheral pulses 2+ and symmetric in all extremities, no LE edema.  Resp: Unlabored respiratory effort, lungs CTAB,   Abd: Soft, non-tender, non-distended  MSK: Spine midline and non-tender  Skin: Warm and dry  Neuro: AO x 3, moves all extremities symmetrically, Motor strength and sensation grossly intact

## 2023-07-30 NOTE — H&P ADULT - NSHPPHYSICALEXAM_GEN_ALL_CORE
PHYSICAL EXAM:    GENERAL: NAD, well-groomed, well-developed  NERVOUS SYSTEM:  A&O X3, interactive, non-focal, Motor Strength 5/5 B/L upper and lower extremities;   HEAD:  Atraumatic, Normocephalic  EYES: EOMI, PERRLA, conjunctiva and sclera clear  ENMT: No tonsillar erythema, exudates, or enlargement; MMM, Good dentition, No lesions  NECK: Supple, No JVD, Normal thyroid  CHEST/LUNG: CTAB; No rales, rhonchi, wheezing, or rubs  HEART: RRR, normal S1/S2; No murmurs, rubs, or gallops  ABDOMEN: Soft, +BS, Nontender, Nondistended  EXTREMITIES:  2+ Peripheral Pulses, No clubbing, cyanosis, or edema  LYMPH: No lymphadenopathy noted  SKIN: No rashes or lesions

## 2023-07-30 NOTE — CONSULT NOTE ADULT - PROBLEM SELECTOR RECOMMENDATION 2
- pt appears fluid overloaded  - x1 lasix 40 mg iv push for diuresis   - hold metoprolol for bradycardia   - TTE from 12/2022 showed EF 30-35%, Segmental wall motion abnormalities in LCx and RCA territory. Moderately reduced RV systolic function. Moderate mitral valve regurgitation. Moderate aortic regurgitation. Severe pulmonary hypertension.   - pending TTE for evaluation of cardiac function and any valvular abnormalities  - Monitor on telemetry.  Strict i/o and daily weights.  Keep K > 4, Mg > 2.  Monitor renal function with ongoing diuresis.

## 2023-07-30 NOTE — ED PROVIDER NOTE - PROGRESS NOTE DETAILS
remains hemodynamically stable on reassessment. cardiology and EP consulted. MICU consulted for admission. -DO Silva

## 2023-07-30 NOTE — ED ADULT NURSE NOTE - OBJECTIVE STATEMENT
pt sent from urgent care for HR in 20's. pt states he took his metoprolol on accident and was supposed to stop. EMS initiated fluids and gave atropine 1mg ivp. pt arrives with HR in 30's in critical care, denies dizziness, headache, vision changes.

## 2023-07-30 NOTE — ED ADULT NURSE NOTE - NSFALLRISKINTERV_ED_ALL_ED

## 2023-07-30 NOTE — ED PROVIDER NOTE - CLINICAL SUMMARY MEDICAL DECISION MAKING FREE TEXT BOX
89y Male with bradycardia in the setting of significant cardiovascular disease, atropine given prior to arrival. Hemodynamically stable, neurovascularly intact. ECG with bradycardia with high AV degree block vs 3rd degree. Differential diagnosis includes but not limited to ACS, arrythmia, high degree AV block vs complete heart block. Cardiology and EP consulted. 89y Male with bradycardia in the setting of significant cardiovascular disease, atropine given prior to arrival. Hemodynamically stable, neurovascularly intact. ECG with bradycardia with high AV degree block vs 3rd degree. Differential diagnosis includes but not limited to ACS, arrythmia, high degree AV block vs complete heart block. Cardiology and EP consulted. Labs and imaging independently reviewed and interpreted. MICU admit.

## 2023-07-30 NOTE — H&P ADULT - HISTORY OF PRESENT ILLNESS
89M with  89M with CAD s/p CABG and PCI, DM, HTN, carotid stenosis, HFrEF, HLD BIBEMS from  with bradycardia HR 30-40s. Adminstered atropine 1mg x2 en route to ED without effect.  89M with CAD s/p CABG and PCI, DM, HTN, carotid stenosis, HFrEF, HLD BIBEMS from  with bradycardia HR 30-40s. Administered atropine 1mg x2 en route to ED without effect.  89M with CAD s/p CABG and PCI, DM, HTN, carotid stenosis, HFrEF, HLD BIBEMS from  with bradycardia HR 30-40s. Administered atropine 1mg x2 en route to ED without effect. Per patient, he accidentally took a dose of metoprolol this morning although it was recently discontinued by his cardiologist. Upon ED arrival, noted to be in high grade AV block vs CHB. Cardiology deemed no acute intervention for TVP; planning for PPM in the morning. MICU consutled for further management of suspected CHB.    [facility]

## 2023-07-30 NOTE — CONSULT NOTE ADULT - SUBJECTIVE AND OBJECTIVE BOX
[facility]                                              CARDIOLOGY AT [address], [place] [address]                                             Telephone: [phone]. Fax:[phone]                                                       CARDIOLOGY CONSULTATION NOTE                                                                                             History obtained by: Patient and medical record  Community Cardiologist: Dr Rock/Dr. Jay    obtained: Yes [  ] No [ x]   Reason for Consultation: heart blockage   Available out pt records reviewed: Yes [ x ] No [  ]    Chief complaint:    Patient is a 89y old  Male who presents with a chief complaint of High grade vs CHB (30 Jul 2023 17:17)      HPI:  89M with CAD s/p CABG and PCI, DM, HTN, carotid stenosis, HFrEF, HLD BIBEMS from  with bradycardia HR 30-40s. Adminstered atropine 1mg x2 en route to ED without effect.  Patient cannot remember if he took metoprolol or not this morning. Patient reports that he was feeling his head was very "full" and felt "blood rushing to his head" so he went to urgent care. In urgent care, he was found to be bradycardic with HR 30-40s. Patient denies chest pain, palpitations, FINNEGAN, SOB, nausea, vomiting, and diarrhea.       CARDIAC TESTING   ECHO: < from: TTE Echo Complete w/o Contrast w/ Doppler (12.12.22 @ 12:36) >   1. Mildly enlarged left atrium.   2. Segmental wall motion abnormalities in LCx and RCA territory.   3. Left ventricular ejection fraction, by visual estimation, is 30 to   35%.   4. Moderately enlarged right atrium.   5. Moderately enlarged right ventricle. Moderately reduced RVsystolic   function.   6. Moderate mitral valve regurgitation.   7. Moderate aortic regurgitation.   8. Estimated pulmonary artery systolic pressure is 69.0 mmHg -severe   pulmonary hypertension.   9. Dilatation of the aortic root.  10. There is no evidence of pericardial effusion.  11. ER team informed of the findings.    < end of copied text >      STRESS: none     CATH: < from: Cardiac Catheterization (12.13.22 @ 18:26) >  The coronary circulation is right dominant.      LM   Left main artery: There is an 80 % stenosis.      LAD   Proximal left anterior descending: There is a 100 % stenosis. Mid left  anterior descending: The previously placed stent is a  drug-eluting stent and is patent.      CX   Circumflex: Angiography shows mild atherosclerosis.      RCA   Mid right coronary artery: The previously placed stent is a  drug-eluting stent and is patent. Distal right coronary artery: The  previously placed stent is a drug-eluting stent and is patent. Right  posterior atrioventricular: The previously placed stent is a  drug-eluting stent and is patent.      Graft Angiography   LIMA graft to Mid left anterior descending: The segment is visually  normal in size and structure.  SVG graft to First obtuse marginal: Angiography shows complete  occlusion.    Aorta   Ascending aorta: There is mild aortic dilation. No patent grafts  identified..    Left Heart Cath   The diaphragmatic segment is hypokinetic. The posterobasal segment is  akinetic. Global left ventricular function is moderately  depressed. Ejection fraction is 30%.    Valves   Aortic Valve: There is no aortic valve stenosis. There is moderate  (2+) aortic insufficiency.  Mitral Valve: The mitral valve exhibits moderate to severe  regurgitation.    < end of copied text >      ELECTROPHYSIOLOGY: none     PAST MEDICAL HISTORY  High cholesterol    Diabetes    MI, old    HTN (hypertension)    GERD (gastroesophageal reflux disease)    Arthritis        PAST SURGICAL HISTORY  S/P CABG (coronary artery bypass graft)    History of coronary artery stent placement        SOCIAL HISTORY:  Denies smoking/alcohol/drugs  CIGARETTES:     ALCOHOL:  DRUGS:    FAMILY HISTORY:  FH: hypertension      Family History of Cardiovascular Disease:  Yes [  ] No [ x ]  Coronary Artery Disease in first degree relative: Yes [  ] No [ x ]  Sudden Cardiac Death in First degree relative: Yes [  ] No [x  ]    HOME MEDICATIONS:  aspirin 81 mg oral tablet: 1 tab(s) orally once a day (30 Aug 2022 13:19)  atorvastatin: 1 tab(s) orally once a day (in the evening) (31 Aug 2022 08:39)  brimonidine 0.2% ophthalmic solution: 1 drop(s) to each affected eye 2 times a day (30 Aug 2022 13:19)  furosemide 40 mg oral tablet: 1 tab(s) orally once a day (30 Aug 2022 19:46)  metFORMIN 500 mg oral tablet: 1 tab(s) orally 2 times a day  Hold X 2 days post procedure. Restart on 12/15/22 (14 Dec 2022 11:26)  Protonix 40 mg oral delayed release tablet: 1 tab(s) orally once a day (12 Dec 2022 19:12)  Timolol Maleate, long-acting 0.5% ophthalmic solution: 1 drop(s) to each affected eye once a day (in the morning) (30 Aug 2022 13:19)      CURRENT CARDIAC MEDICATIONS:  DOPamine Infusion 5 MICROgram(s)/kG/Min IV Continuous <Continuous>      CURRENT OTHER MEDICATIONS:  pantoprazole    Tablet 40 milliGRAM(s) Oral before breakfast  polyethylene glycol 3350 17 Gram(s) Oral daily  aspirin  chewable 81 milliGRAM(s) Oral daily  atorvastatin 80 milliGRAM(s) Oral at bedtime  brimonidine 0.2% Ophthalmic Solution 1 Drop(s) Both EYES two times a day  chlorhexidine 2% Cloths 1 Application(s) Topical <User Schedule>  clopidogrel Tablet 75 milliGRAM(s) Oral daily  heparin   Injectable 5000 Unit(s) SubCutaneous every 8 hours  insulin lispro (ADMELOG) corrective regimen sliding scale   SubCutaneous three times a day before meals  insulin lispro (ADMELOG) corrective regimen sliding scale   SubCutaneous at bedtime      ALLERGIES:   No Known Allergies      REVIEW OF SYMPTOMS:   CONSTITUTIONAL: No fever, no chills, no weight loss, no weight gain, no fatigue   ENMT:  No vertigo; No sinus or throat pain  NECK: No pain or stiffness  CARDIOVASCULAR: No chest pain, no dyspnea, no syncope/presyncope, no palpitations, no dizziness, no Orthopnea, no Paroxsymal nocturnal dyspnea  RESPIRATORY: no Shortness of breath, no cough, no wheezing  : No dysuria, no hematuria   GI: No dark color stool, no nausea, no diarrhea, no constipation, no abdominal pain   NEURO: No headache, no slurred speech   MUSCULOSKELETAL: No joint pain or swelling; No muscle, back, or extremity pain  PSYCH: No agitation, no anxiety.    ALL OTHER REVIEW OF SYSTEMS ARE NEGATIVE.    VITAL SIGNS:  T(C): 36.1 (07-30-23 @ 13:00), Max: 36.1 (07-30-23 @ 13:00)  T(F): 97 (07-30-23 @ 13:00), Max: 97 (07-30-23 @ 13:00)  HR: 35 (07-30-23 @ 15:17) (32 - 47)  BP: 126/74 (07-30-23 @ 15:17) (107/58 - 138/56)  RR: 16 (07-30-23 @ 15:17) (14 - 16)  SpO2: 97% (07-30-23 @ 15:17) (96% - 97%)    INTAKE AND OUTPUT:       PHYSICAL EXAM:  Constitutional: Comfortable . No acute distress.   HEENT: Atraumatic and normocephalic , neck is supple . no JVD. No carotid bruit.  CNS: A&Ox3. No focal deficits.   Respiratory: CTAB, unlabored   Cardiovascular: Bradycardic, No murmur. No rubs or gallop.  Gastrointestinal: Soft, non-tender. +Bowel sounds.   Extremities: 2+ Peripheral Pulses, No clubbing, cyanosis. +2 B/L LE edema  Psychiatric: Calm . no agitation.   Skin: Warm and dry, no ulcers on extremities     LABS:  ( 30 Jul 2023 13:00 )  Troponin T  0.02 ,  CPK  X    , CKMB  X    , BNP X        , ( 28 Jul 2023 15:55 )  Troponin T  0.01 ,  CPK  X    , CKMB  X    , BNP X        , ( 28 Jul 2023 09:10 )  Troponin T  0.01 ,  CPK  X    , CKMB  X    , BNP X                                  13.4   5.48  )-----------( 147      ( 30 Jul 2023 13:00 )             40.9     07-30    135  |  104  |  30.2<H>  ----------------------------<  105<H>  5.6<H>   |  21.0<L>  |  1.26    Ca    8.2<L>      30 Jul 2023 14:32  Mg     2.1     07-30    TPro  6.6  /  Alb  3.6  /  TBili  0.7  /  DBili  x   /  AST  40<H>  /  ALT  33  /  AlkPhos  74  07-30    PT/INR - ( 30 Jul 2023 14:07 )   PT: 14.9 sec;   INR: 1.35 ratio         PTT - ( 30 Jul 2023 14:07 )  PTT:29.3 sec  Urinalysis Basic - ( 30 Jul 2023 14:32 )    Color: x / Appearance: x / SG: x / pH: x  Gluc: 105 mg/dL / Ketone: x  / Bili: x / Urobili: x   Blood: x / Protein: x / Nitrite: x   Leuk Esterase: x / RBC: x / WBC x   Sq Epi: x / Non Sq Epi: x / Bacteria: x              INTERPRETATION OF TELEMETRY: Sinus bradycardia     ECG: Afib   Prior ECG: Yes [ x ] No [  ]    RADIOLOGY & ADDITIONAL STUDIES:    X-ray:  < from: Xray Chest 1 View- PORTABLE-Urgent (07.28.23 @ 10:40) >  IMPRESSION: Slight linear residual densities in the retrocardiac area. No   signs of acute finding.    < end of copied text >    CT scan: < from: CT Abdomen and Pelvis No Cont (07.28.23 @ 10:36) >  Mild acute diverticulitis of a giant sigmoid diverticulum. No fluid   collection.    < end of copied text >    MRI:   US:

## 2023-07-30 NOTE — PROVIDER CONTACT NOTE (OTHER) - ACTION/TREATMENT ORDERED:
12 lead ekg performed, labs drawn, chest x-ray called, respiratory called for bipap, pain meds given.

## 2023-07-30 NOTE — H&P ADULT - ASSESSMENT
89M with CAD s/p CABG and PCI, DM, HTN, carotid stenosis, HFrEF, HLD:      Plan:  NEURO: Mentation intact.   CARDIAC:  RESPIRATORY: Satting high 90s on RA. Goal SpO2 >92%.  GI: Cardiac, carb consistent diet.  : Trend Cr, monitor I&Os. Goal K>4, Mg>2, P>3 for optimal arrhythmia suppression.  ENDO: ISS. Goal -180. F/U TSH.  ID: Afebrile, WBC wnl. No clinical signs of infection or current indication for abx. Recently treated with unasyn in ED for acute diverticulitis.   HEME: SQH for DVT ppx. SCDs.    Dispo: Admit to MICU for further management.    Case discussed with ICU Attending Dr Lazo.      Critical Care time: 65 mins assessing presenting problems of acute illness that poses high probability of life threatening deterioration or end organ damage/dysfunction.  Medical decision making including Initiating plan of care, reviewing data, reviewing radiology, direct patient bedside evaluation and interpretation of vital signs, any necessary ventilator management , discussion with multidisciplinary team, discussing goals of care with patient/family, all non inclusive of procedures  89M with CAD s/p CABG and PCI, DM, HTN, carotid stenosis, HFrEF, HLD now admitted to [facility] ICU with:        Plan:  NEURO: Mentation intact.   CARDIAC:  RESPIRATORY: Satting high 90s on RA. Goal SpO2 >92%.  GI: Cardiac, carb consistent diet.  : Trend Cr, monitor I&Os. Goal K>4, Mg>2, P>3 for optimal arrhythmia suppression.  ENDO: ISS. Goal -180. F/U TSH.  ID: Afebrile, WBC wnl. No clinical signs of infection or current indication for abx. Recently treated with unasyn in ED for acute diverticulitis.   HEME: SQH for DVT ppx. SCDs.    Dispo: Admit to MICU for further management.    Case discussed with ICU Attending Dr Lazo.      Critical Care time: 65 mins assessing presenting problems of acute illness that poses high probability of life threatening deterioration or end organ damage/dysfunction.  Medical decision making including Initiating plan of care, reviewing data, reviewing radiology, direct patient bedside evaluation and interpretation of vital signs, any necessary ventilator management , discussion with multidisciplinary team, discussing goals of care with patient/family, all non inclusive of procedures  89M with CAD s/p CABG and PCI, DM, HTN, carotid stenosis, HFrEF, HLD now admitted to [facility] ICU with:    SSS iso AFlutter  JAMARI    Plan:  NEURO: Mentation intact.   CARDIAC: EKG appears to be SSS iso AFlutter, remains bradycardic to HR 30s. Actively titrating dopamine for goal HR 60-90. Plan for PPM tomorrow morning. Restarted home ASA, Plavix, Lipitor. Cardiology following.  RESPIRATORY: Satting high 90s on RA. Goal SpO2 >92%.  GI: Cardiac, carb consistent diet. NPO after MN for possible PPM tomorrow.  : Trend Cr, monitor I&Os. Goal K>4, Mg>2, P>3 for optimal arrhythmia suppression.  ENDO: ISS. Goal -180. F/U TSH.  ID: Afebrile, WBC wnl. No clinical signs of infection or current indication for abx. Recently treated with unasyn in ED for acute diverticulitis.   HEME: SQH for DVT ppx. SCDs.    Dispo: Admit to MICU for further management. Plan for possible PPM tomorrow.  FULL CODE    Case discussed with ICU Attending Dr Lazo.      Critical Care time: 65 mins assessing presenting problems of acute illness that poses high probability of life threatening deterioration or end organ damage/dysfunction.  Medical decision making including Initiating plan of care, reviewing data, reviewing radiology, direct patient bedside evaluation and interpretation of vital signs, any necessary ventilator management , discussion with multidisciplinary team, discussing goals of care with patient/family, all non inclusive of procedures  89M with CAD s/p CABG and PCI, DM, HTN, carotid stenosis, HFrEF, HLD now admitted to [facility] ICU with:    SSS iso AFlutter  JAMARI    Plan:  NEURO: Mentation intact.   CARDIAC: EKG appears to be SSS iso AFlutter, remains bradycardic to HR 30s. Started on fixed dose dopamine in attempt to increase HR. Keep pads in place. Cardiology deemed no acute indication for TVP and plan for PPM tomorrow morning. Restarted home ASA, Plavix, Lipitor. F/U TTE in AM.  RESPIRATORY: Satting high 90s on RA. Goal SpO2 >92%.  GI: Cardiac, carb consistent diet. NPO after MN for possible PPM tomorrow.  : Trend Cr, monitor I&Os. Goal K>4, Mg>2, P>3 for optimal arrhythmia suppression.  ENDO: ISS. Goal -180. F/U TSH.  ID: Afebrile, WBC wnl. No clinical signs of infection or current indication for abx. Recently treated with unasyn in ED for acute diverticulitis.   HEME: SQH for DVT ppx. SCDs.    Dispo: Admit to MICU for further management. Plan for possible PPM tomorrow.  FULL CODE    Case discussed with ICU Attending Dr Lazo.      Critical Care time: 65 mins assessing presenting problems of acute illness that poses high probability of life threatening deterioration or end organ damage/dysfunction.  Medical decision making including Initiating plan of care, reviewing data, reviewing radiology, direct patient bedside evaluation and interpretation of vital signs, any necessary ventilator management , discussion with multidisciplinary team, discussing goals of care with patient/family, all non inclusive of procedures  89M with CAD s/p CABG and PCI, DM, HTN, carotid stenosis, HFrEF, HLD now admitted to [facility] ICU with:    SSS iso AFlutter  JAMARI    Plan:  NEURO: Mentation intact.   CARDIAC: EKG appears to be SSS iso AFlutter, remains bradycardic to HR 30s. Started on fixed dose dopamine in attempt to increase HR. Keep pads in place. Avoid AVN blocking agents. Cardiology deemed no acute indication for TVP and plan for PPM tomorrow morning. Restarted home ASA, Plavix, Lipitor. F/U TTE in AM.  RESPIRATORY: Satting high 90s on RA. Goal SpO2 >92%.  GI: Cardiac, carb consistent diet. NPO after MN for possible PPM tomorrow.  : Trend Cr, monitor I&Os. Goal K>4, Mg>2, P>3 for optimal arrhythmia suppression.  ENDO: ISS. Goal -180. F/U TSH.  ID: Afebrile, WBC wnl. No clinical signs of infection or current indication for abx. Recently treated with unasyn in ED for acute diverticulitis.   HEME: SQH for DVT ppx. SCDs.    Dispo: Admit to MICU for further management. Plan for possible PPM tomorrow.  FULL CODE    Case discussed with ICU Attending Dr Lazo.      Critical Care time: 65 mins assessing presenting problems of acute illness that poses high probability of life threatening deterioration or end organ damage/dysfunction.  Medical decision making including Initiating plan of care, reviewing data, reviewing radiology, direct patient bedside evaluation and interpretation of vital signs, any necessary ventilator management , discussion with multidisciplinary team, discussing goals of care with patient/family, all non inclusive of procedures  89M with CAD s/p CABG and PCI, DM, HTN, carotid stenosis, HFrEF, HLD now admitted to [facility] ICU with:    SSS iso AFlutter  JAMARI    Plan:  NEURO: Mentation intact.   CARDIAC: EKG appears to be SSS iso AFlutter, remains bradycardic to HR 30s. Started on fixed dose dopamine in attempt to increase HR. Keep pads in place. Avoid AVN blocking agents. Cardiology deemed no acute indication for TVP and plan for PPM tomorrow morning. Restarted home ASA, Plavix, Lipitor. F/U TTE in AM.  RESPIRATORY: Satting high 90s on RA. Goal SpO2 >92%.  GI: Cardiac, carb consistent diet. NPO after MN for possible PPM tomorrow.  : Trend Cr, monitor I&Os. Goal K>4, Mg>2, P>3 for optimal arrhythmia suppression.  ENDO: ISS. Goal -180. F/U TSH.  ID: Afebrile, WBC wnl. No clinical signs of infection or current indication for abx. Recently treated with unasyn in ED for acute diverticulitis.   HEME: SQH for DVT ppx. SCDs.    Dispo: Patient remains at high risk of acute hemodynamic decompensation given ongoing SSS. Admit to MICU for further management. Plan for possible PPM tomorrow.   FULL CODE    Case discussed with ICU Attending Dr Lazo.      Critical Care time: 65 mins assessing presenting problems of acute illness that poses high probability of life threatening deterioration or end organ damage/dysfunction.  Medical decision making including Initiating plan of care, reviewing data, reviewing radiology, direct patient bedside evaluation and interpretation of vital signs, any necessary ventilator management , discussion with multidisciplinary team, discussing goals of care with patient/family, all non inclusive of procedures

## 2023-07-30 NOTE — ED ADULT NURSE REASSESSMENT NOTE - NS ED NURSE REASSESS COMMENT FT1
assumed care of patient 1500. pt alert and oriented x4 daughter at bedside. pt denies dizziness. HR 32

## 2023-07-30 NOTE — ED PROVIDER NOTE - ATTENDING CONTRIBUTION TO CARE
Alex: I performed a face to face evaluation of this patient and performed a full history and physical examination on the patient.  I agree with the resident's history, physical examination, and plan of the patient unless otherwise noted. My brief assessment is as follows: pmh as documented sent for bradycardia. pt states felt mild dizziness briefly that resolved, but went to urgent care for eval and sent here. denies symptoms currently. here 2 days ago for chest pain, joy to 50 and was told to stop his 25mg metoprolol. pt forgot this and accidentally took 1 pill this morning. no other complaints. arrives joy ~35, bp ~115/50 denying symptoms, ctab, abd benign, neuro intact. ekg with ?high grade av block vs junctional (difficult to discern p waves though possible somewhat regular baseline change in ekg that may be p wave). cards/EP, close monitor, will start levo if develops hypotension/symptoms. icu consult

## 2023-07-30 NOTE — ED PROVIDER NOTE - OBJECTIVE STATEMENT
89y Male with history of CAD s/p CABG, s/p PCI, DM, HTN, Carotid Stenosis, HFrEF, HLD BIBEMS presenting with low heart rate and chest pain. Patient was administered atropine 1mg x 2 prior to arrival. Patient reports taking 25mg metoprolol by accident this morning after being discontinued by cardiology. Denies fevers, chills, headache, palpitations, shortness of breath, cough, nausea, vomiting, diarrhea, hematuria, dysuria, dark stools, focal neurologic symptoms.

## 2023-07-30 NOTE — CONSULT NOTE ADULT - PROBLEM SELECTOR RECOMMENDATION 9
- patient found to be bradycardic with HR in the 30-40s on arrival to the ED   - Patient cannot remember if he took metoprolol this morning   - monitor on telemetry  - avoid AV tariq blocking medications  - NPO after midnight tonight for possible PPM tomorrow - patient found to be bradycardic with HR in the 30-40s on arrival to the ED   - Patient cannot remember if he took metoprolol this morning   - monitor on telemetry  - avoid AV tariq blocking medications  - NPO after midnight tonight for possible procedure  - EP consult

## 2023-07-31 LAB
A1C WITH ESTIMATED AVERAGE GLUCOSE RESULT: 6.8 % — HIGH (ref 4–5.6)
ALBUMIN SERPL ELPH-MCNC: 3.3 G/DL — SIGNIFICANT CHANGE UP (ref 3.3–5.2)
ALP SERPL-CCNC: 73 U/L — SIGNIFICANT CHANGE UP (ref 40–120)
ALT FLD-CCNC: 27 U/L — SIGNIFICANT CHANGE UP
ANION GAP SERPL CALC-SCNC: 13 MMOL/L — SIGNIFICANT CHANGE UP (ref 5–17)
AST SERPL-CCNC: 36 U/L — SIGNIFICANT CHANGE UP
BASOPHILS # BLD AUTO: 0.02 K/UL — SIGNIFICANT CHANGE UP (ref 0–0.2)
BASOPHILS NFR BLD AUTO: 0.2 % — SIGNIFICANT CHANGE UP (ref 0–2)
BILIRUB SERPL-MCNC: 0.8 MG/DL — SIGNIFICANT CHANGE UP (ref 0.4–2)
BUN SERPL-MCNC: 30.7 MG/DL — HIGH (ref 8–20)
CALCIUM SERPL-MCNC: 9 MG/DL — SIGNIFICANT CHANGE UP (ref 8.4–10.5)
CHLORIDE SERPL-SCNC: 98 MMOL/L — SIGNIFICANT CHANGE UP (ref 96–108)
CK SERPL-CCNC: 74 U/L — SIGNIFICANT CHANGE UP (ref 30–200)
CO2 SERPL-SCNC: 22 MMOL/L — SIGNIFICANT CHANGE UP (ref 22–29)
CREAT SERPL-MCNC: 1.24 MG/DL — SIGNIFICANT CHANGE UP (ref 0.5–1.3)
CULTURE RESULTS: SIGNIFICANT CHANGE UP
EGFR: 55 ML/MIN/1.73M2 — LOW
EOSINOPHIL # BLD AUTO: 0.01 K/UL — SIGNIFICANT CHANGE UP (ref 0–0.5)
EOSINOPHIL NFR BLD AUTO: 0.1 % — SIGNIFICANT CHANGE UP (ref 0–6)
ESTIMATED AVERAGE GLUCOSE: 148 MG/DL — HIGH (ref 68–114)
GLUCOSE BLDC GLUCOMTR-MCNC: 146 MG/DL — HIGH (ref 70–99)
GLUCOSE BLDC GLUCOMTR-MCNC: 163 MG/DL — HIGH (ref 70–99)
GLUCOSE BLDC GLUCOMTR-MCNC: 69 MG/DL — LOW (ref 70–99)
GLUCOSE BLDC GLUCOMTR-MCNC: 76 MG/DL — SIGNIFICANT CHANGE UP (ref 70–99)
GLUCOSE SERPL-MCNC: 88 MG/DL — SIGNIFICANT CHANGE UP (ref 70–99)
HCT VFR BLD CALC: 43.1 % — SIGNIFICANT CHANGE UP (ref 39–50)
HGB BLD-MCNC: 14.1 G/DL — SIGNIFICANT CHANGE UP (ref 13–17)
IMM GRANULOCYTES NFR BLD AUTO: 0.3 % — SIGNIFICANT CHANGE UP (ref 0–0.9)
LYMPHOCYTES # BLD AUTO: 1.41 K/UL — SIGNIFICANT CHANGE UP (ref 1–3.3)
LYMPHOCYTES # BLD AUTO: 12.6 % — LOW (ref 13–44)
MAGNESIUM SERPL-MCNC: 1.9 MG/DL — SIGNIFICANT CHANGE UP (ref 1.6–2.6)
MCHC RBC-ENTMCNC: 30.5 PG — SIGNIFICANT CHANGE UP (ref 27–34)
MCHC RBC-ENTMCNC: 32.7 GM/DL — SIGNIFICANT CHANGE UP (ref 32–36)
MCV RBC AUTO: 93.1 FL — SIGNIFICANT CHANGE UP (ref 80–100)
MONOCYTES # BLD AUTO: 1.27 K/UL — HIGH (ref 0–0.9)
MONOCYTES NFR BLD AUTO: 11.3 % — SIGNIFICANT CHANGE UP (ref 2–14)
MRSA PCR RESULT.: SIGNIFICANT CHANGE UP
NEUTROPHILS # BLD AUTO: 8.49 K/UL — HIGH (ref 1.8–7.4)
NEUTROPHILS NFR BLD AUTO: 75.5 % — SIGNIFICANT CHANGE UP (ref 43–77)
PHOSPHATE SERPL-MCNC: 4.8 MG/DL — HIGH (ref 2.4–4.7)
PLATELET # BLD AUTO: 168 K/UL — SIGNIFICANT CHANGE UP (ref 150–400)
POTASSIUM SERPL-MCNC: 5.1 MMOL/L — SIGNIFICANT CHANGE UP (ref 3.5–5.3)
POTASSIUM SERPL-SCNC: 5.1 MMOL/L — SIGNIFICANT CHANGE UP (ref 3.5–5.3)
PROT SERPL-MCNC: 6.1 G/DL — LOW (ref 6.6–8.7)
RBC # BLD: 4.63 M/UL — SIGNIFICANT CHANGE UP (ref 4.2–5.8)
RBC # FLD: 14.1 % — SIGNIFICANT CHANGE UP (ref 10.3–14.5)
S AUREUS DNA NOSE QL NAA+PROBE: SIGNIFICANT CHANGE UP
SODIUM SERPL-SCNC: 133 MMOL/L — LOW (ref 135–145)
SPECIMEN SOURCE: SIGNIFICANT CHANGE UP
TROPONIN T SERPL-MCNC: 0.2 NG/ML — HIGH (ref 0–0.06)
TSH SERPL-MCNC: 2.48 UIU/ML — SIGNIFICANT CHANGE UP (ref 0.27–4.2)
WBC # BLD: 11.23 K/UL — HIGH (ref 3.8–10.5)
WBC # FLD AUTO: 11.23 K/UL — HIGH (ref 3.8–10.5)

## 2023-07-31 PROCEDURE — 99223 1ST HOSP IP/OBS HIGH 75: CPT

## 2023-07-31 PROCEDURE — 33274 TCAT INSJ/RPL PERM LDLS PM: CPT

## 2023-07-31 PROCEDURE — 93010 ELECTROCARDIOGRAM REPORT: CPT

## 2023-07-31 PROCEDURE — 93306 TTE W/DOPPLER COMPLETE: CPT | Mod: 26

## 2023-07-31 PROCEDURE — 33285 INSJ SUBQ CAR RHYTHM MNTR: CPT

## 2023-07-31 RX ORDER — EPINEPHRINE 0.3 MG/.3ML
0.03 INJECTION INTRAMUSCULAR; SUBCUTANEOUS
Qty: 4 | Refills: 0 | Status: DISCONTINUED | OUTPATIENT
Start: 2023-07-31 | End: 2023-07-31

## 2023-07-31 RX ORDER — DEXTROSE 50 % IN WATER 50 %
50 SYRINGE (ML) INTRAVENOUS ONCE
Refills: 0 | Status: COMPLETED | OUTPATIENT
Start: 2023-07-31 | End: 2023-07-31

## 2023-07-31 RX ORDER — EPINEPHRINE 0.3 MG/.3ML
0.12 INJECTION INTRAMUSCULAR; SUBCUTANEOUS
Qty: 4 | Refills: 0 | Status: DISCONTINUED | OUTPATIENT
Start: 2023-07-31 | End: 2023-07-31

## 2023-07-31 RX ORDER — CEFAZOLIN SODIUM 1 G
2000 VIAL (EA) INJECTION EVERY 8 HOURS
Refills: 0 | Status: COMPLETED | OUTPATIENT
Start: 2023-07-31 | End: 2023-08-01

## 2023-07-31 RX ORDER — CEFAZOLIN SODIUM 1 G
2000 VIAL (EA) INJECTION EVERY 8 HOURS
Refills: 0 | Status: DISCONTINUED | OUTPATIENT
Start: 2023-07-31 | End: 2023-07-31

## 2023-07-31 RX ORDER — APIXABAN 2.5 MG/1
5 TABLET, FILM COATED ORAL ONCE
Refills: 0 | Status: COMPLETED | OUTPATIENT
Start: 2023-07-31 | End: 2023-07-31

## 2023-07-31 RX ORDER — DOPAMINE HYDROCHLORIDE 40 MG/ML
10 INJECTION, SOLUTION, CONCENTRATE INTRAVENOUS
Qty: 400 | Refills: 0 | Status: DISCONTINUED | OUTPATIENT
Start: 2023-07-31 | End: 2023-07-31

## 2023-07-31 RX ORDER — ONDANSETRON 8 MG/1
4 TABLET, FILM COATED ORAL ONCE
Refills: 0 | Status: COMPLETED | OUTPATIENT
Start: 2023-07-31 | End: 2023-07-31

## 2023-07-31 RX ADMIN — ATORVASTATIN CALCIUM 80 MILLIGRAM(S): 80 TABLET, FILM COATED ORAL at 22:19

## 2023-07-31 RX ADMIN — Medication 50 MILLILITER(S): at 06:33

## 2023-07-31 RX ADMIN — APIXABAN 5 MILLIGRAM(S): 2.5 TABLET, FILM COATED ORAL at 22:19

## 2023-07-31 RX ADMIN — EPINEPHRINE 30.8 MICROGRAM(S)/KG/MIN: 0.3 INJECTION INTRAMUSCULAR; SUBCUTANEOUS at 14:46

## 2023-07-31 RX ADMIN — HEPARIN SODIUM 5000 UNIT(S): 5000 INJECTION INTRAVENOUS; SUBCUTANEOUS at 05:45

## 2023-07-31 RX ADMIN — Medication 2000 MILLIGRAM(S): at 22:18

## 2023-07-31 RX ADMIN — CHLORHEXIDINE GLUCONATE 1 APPLICATION(S): 213 SOLUTION TOPICAL at 05:44

## 2023-07-31 RX ADMIN — BRIMONIDINE TARTRATE 1 DROP(S): 2 SOLUTION/ DROPS OPHTHALMIC at 06:05

## 2023-07-31 RX ADMIN — BRIMONIDINE TARTRATE 1 DROP(S): 2 SOLUTION/ DROPS OPHTHALMIC at 22:19

## 2023-07-31 RX ADMIN — ONDANSETRON 4 MILLIGRAM(S): 8 TABLET, FILM COATED ORAL at 09:32

## 2023-07-31 RX ADMIN — CLOPIDOGREL BISULFATE 75 MILLIGRAM(S): 75 TABLET, FILM COATED ORAL at 20:24

## 2023-07-31 NOTE — PROGRESS NOTE ADULT - SUBJECTIVE AND OBJECTIVE BOX
Patient is a 89y old  Male who presents with a chief complaint of High grade vs CHB (30 Jul 2023 20:57)      BRIEF HOSPITAL COURSE: 89M with CAD s/p CABG and PCI, DM, HTN, carotid stenosis, HFrEF, HLD admitted for to the MICU for symptomatic bradycardia unresponsive to atropine. Patients cardiologist had DCed metoprolol but pt had accidentally taken morning of presentation. Found to be in high grade AV block vs CHB with rates between 30-40. Last echo in 12/2022 showed EF 30-35%. Patient was placed on dopamine with improvement of rate to the 50s. Case was discussed with EP and planning for pacemaker tomorrow morning.     Events last 24 hours: Over night, pt desatted. Dopamine stopped. Bedside ultrasound showed diffuse b-lines with possible reduction in EF. Pt became hypertensive to 170s-190s. Pt started on bipap and lasix with hydralazine for pressure with improvement in exam and symptoms.    PAST MEDICAL & SURGICAL HISTORY:  High cholesterol      Diabetes      MI, old      HTN (hypertension)      GERD (gastroesophageal reflux disease)      Arthritis      S/P CABG (coronary artery bypass graft)      History of coronary artery stent placement          Review of Systems:  CONSTITUTIONAL: No fever, chills, or fatigue  EYES: No eye pain, visual disturbances, or discharge  ENMT:  No difficulty hearing, tinnitus, vertigo; No sinus or throat pain  NECK: No pain or stiffness  RESPIRATORY: No cough, wheezing, chills or hemoptysis; No shortness of breath  CARDIOVASCULAR: No chest pain, palpitations, dizziness, or leg swelling  GASTROINTESTINAL: No abdominal or epigastric pain. No nausea, vomiting, or hematemesis; No diarrhea or constipation. No melena or hematochezia.  GENITOURINARY: No dysuria, frequency, hematuria, or incontinence  NEUROLOGICAL: No headaches, memory loss, loss of strength, numbness, or tremors  SKIN: No itching, burning, rashes, or lesions   MUSCULOSKELETAL: No joint pain or swelling; No muscle, back, or extremity pain  PSYCHIATRIC: No depression, anxiety, mood swings, or difficulty sleeping      Medications:    DOPamine Infusion 10 MICROgram(s)/kG/Min IV Continuous <Continuous>  hydrALAZINE Injectable 10 milliGRAM(s) IV Push every 6 hours PRN          aspirin  chewable 81 milliGRAM(s) Oral daily  clopidogrel Tablet 75 milliGRAM(s) Oral daily  heparin   Injectable 5000 Unit(s) SubCutaneous every 8 hours    pantoprazole    Tablet 40 milliGRAM(s) Oral before breakfast  polyethylene glycol 3350 17 Gram(s) Oral daily      atorvastatin 80 milliGRAM(s) Oral at bedtime  insulin lispro (ADMELOG) corrective regimen sliding scale   SubCutaneous three times a day before meals  insulin lispro (ADMELOG) corrective regimen sliding scale   SubCutaneous at bedtime        brimonidine 0.2% Ophthalmic Solution 1 Drop(s) Both EYES two times a day  chlorhexidine 2% Cloths 1 Application(s) Topical <User Schedule>            ICU Vital Signs Last 24 Hrs  T(C): 37.4 (31 Jul 2023 08:00), Max: 37.7 (31 Jul 2023 06:30)  T(F): 99.3 (31 Jul 2023 08:00), Max: 99.9 (31 Jul 2023 06:30)  HR: 52 (31 Jul 2023 08:00) (32 - 83)  BP: 116/49 (31 Jul 2023 08:00) (103/42 - 189/84)  BP(mean): 68 (31 Jul 2023 08:00) (47 - 112)  ABP: --  ABP(mean): --  RR: 20 (31 Jul 2023 08:00) (12 - 26)  SpO2: 98% (31 Jul 2023 08:00) (92% - 100%)    O2 Parameters below as of 31 Jul 2023 04:00  Patient On (Oxygen Delivery Method): BiPAP/CPAP            ABG - ( 30 Jul 2023 20:36 )  pH, Arterial: 7.260 pH, Blood: x     /  pCO2: 50    /  pO2: 214   / HCO3: 22    / Base Excess: -4.7  /  SaO2: 100.0               I&O's Detail    30 Jul 2023 07:01  -  31 Jul 2023 07:00  --------------------------------------------------------  IN:    DOPamine Infusion: 12.3 mL  Total IN: 12.3 mL    OUT:    Indwelling Catheter - Urethral (mL): 1715 mL    Voided (mL): 150 mL  Total OUT: 1865 mL    Total NET: -1852.7 mL            LABS:                        14.1   11.23 )-----------( 168      ( 31 Jul 2023 03:15 )             43.1     07-31    133<L>  |  98  |  30.7<H>  ----------------------------<  88  5.1   |  22.0  |  1.24    Ca    9.0      31 Jul 2023 03:15  Phos  4.8     07-31  Mg     1.9     07-31    TPro  6.1<L>  /  Alb  3.3  /  TBili  0.8  /  DBili  x   /  AST  36  /  ALT  27  /  AlkPhos  73  07-31      CARDIAC MARKERS ( 31 Jul 2023 06:15 )  x     / 0.20 ng/mL / 74 U/L / x     / x      CARDIAC MARKERS ( 30 Jul 2023 19:50 )  x     / 0.02 ng/mL / 99 U/L / x     / x      CARDIAC MARKERS ( 30 Jul 2023 13:00 )  x     / 0.02 ng/mL / x     / x     / x          CAPILLARY BLOOD GLUCOSE      POCT Blood Glucose.: 146 mg/dL (31 Jul 2023 09:26)    PT/INR - ( 30 Jul 2023 19:50 )   PT: 12.5 sec;   INR: 1.13 ratio         PTT - ( 30 Jul 2023 19:50 )  PTT:24.9 sec  Urinalysis Basic - ( 31 Jul 2023 03:15 )    Color: x / Appearance: x / SG: x / pH: x  Gluc: 88 mg/dL / Ketone: x  / Bili: x / Urobili: x   Blood: x / Protein: x / Nitrite: x   Leuk Esterase: x / RBC: x / WBC x   Sq Epi: x / Non Sq Epi: x / Bacteria: x      CULTURES:  Culture Results:   <10,000 CFU/mL Normal Urogenital Indigo (07-28 @ 09:45)      Physical Examination:    General: No acute distress.      HEENT: Pupils equal, reactive to light.  Symmetric.    PULM: Clear to auscultation bilaterally, no significant sputum production    NECK: Supple, no lymphadenopathy, trachea midline    CVS: Bradycardic to 50s, intermittent pauses, no murmurs, rubs, or gallops    ABD: Soft, nondistended, nontender, normoactive bowel sounds, no masses    EXT: No edema, nontender    SKIN: Warm and well perfused, no rashes noted.    NEURO: Alert, oriented, interactive, nonfocal

## 2023-07-31 NOTE — CHART NOTE - NSCHARTNOTEFT_GEN_A_CORE
Admitted with Complete heart block . Atrial flutter with variable FV interval.    corrine agreeable for pacemaker.    EOP consult.  Trans paula to ICU.    nothing per orally after midnight. .  hold beta-blocker .   anticoagulation depending on EP team.   hold anticoagulation for now for possibel procedure.   Ep to follopw up tomorrow.   Kristopher elirdiologist [facility].
Pt follows with [facility] [phone]. [facility] will not follow. MICU aware and will discuss with [facility].

## 2023-07-31 NOTE — PROGRESS NOTE ADULT - SUBJECTIVE AND OBJECTIVE BOX
89 year old patient of Dr Chino presents with high grade AVB. He was seen by me as ED consult July 28. He was found to have acute CHF, JAMARI, mild bradycardia in setting of new onset atrial flutter. He left AMA the same day. 7/29 evening returned with more significant bradycardia    Other atrioventricular block    FH: hypertension    Handoff    MEWS Score    High cholesterol    Diabetes    MI, old    HTN (hypertension)    GERD (gastroesophageal reflux disease)    Arthritis    High degree atrioventricular block    Bradycardia    HFrEF (heart failure with reduced ejection fraction)    S/P CABG (coronary artery bypass graft)    History of coronary artery stent placement    CHEST PAIN    1    SysAdmin_VisitLink        aspirin  chewable 81 milliGRAM(s) Oral daily  atorvastatin 80 milliGRAM(s) Oral at bedtime  brimonidine 0.2% Ophthalmic Solution 1 Drop(s) Both EYES two times a day  chlorhexidine 2% Cloths 1 Application(s) Topical <User Schedule>  clopidogrel Tablet 75 milliGRAM(s) Oral daily  DOPamine Infusion 10 MICROgram(s)/kG/Min IV Continuous <Continuous>  EPINEPHrine    Infusion 0.125 MICROgram(s)/kG/Min IV Continuous <Continuous>  heparin   Injectable 5000 Unit(s) SubCutaneous every 8 hours  hydrALAZINE Injectable 10 milliGRAM(s) IV Push every 6 hours PRN  insulin lispro (ADMELOG) corrective regimen sliding scale   SubCutaneous three times a day before meals  insulin lispro (ADMELOG) corrective regimen sliding scale   SubCutaneous at bedtime  pantoprazole    Tablet 40 milliGRAM(s) Oral before breakfast  polyethylene glycol 3350 17 Gram(s) Oral daily      T(C): 37.3 (07-31-23 @ 10:00), Max: 37.7 (07-31-23 @ 06:30)  HR: 30 (07-31-23 @ 10:00) (30 - 83)  BP: 128/44 (07-31-23 @ 10:00) (103/42 - 189/84)  RR: 16 (07-31-23 @ 10:00) (12 - 26)  SpO2: 100% (07-31-23 @ 10:00) (92% - 100%)    07-30-23 @ 07:01  -  07-31-23 @ 07:00  --------------------------------------------------------  IN: 12.3 mL / OUT: 1865 mL / NET: -1852.7 mL        Gen: no distress  CV: normal S1 and S2  Resp: Lungs CTA    07-31    133<L>  |  98  |  30.7<H>  ----------------------------<  88  5.1   |  22.0  |  1.24    Ca    9.0      31 Jul 2023 03:15  Phos  4.8     07-31  Mg     1.9     07-31    TPro  6.1<L>  /  Alb  3.3  /  TBili  0.8  /  DBili  x   /  AST  36  /  ALT  27  /  AlkPhos  73  07-31                          14.1   11.23 )-----------( 168      ( 31 Jul 2023 03:15 )             43.1

## 2023-07-31 NOTE — CONSULT NOTE ADULT - TIME BILLING
D/W MICU Dr Lazo, RN, pt and family    Total time also includes discussion during interdisciplinary team rounds, chart review including but limited to prior admissions/ GOC discussions, review of established ACP documentations ( ex. living will, HCP, MOLST form),  review of medications/ labs/ imaging, examination, care coordination with other health care professionals, documentation EXCLUDING current goals of care or advance care planning discussions.

## 2023-07-31 NOTE — CONSULT NOTE ADULT - SUBJECTIVE AND OBJECTIVE BOX
History obtained from patient, wife Joleen and Daughter Destiny at bedside    This is a very pleasant 89 year old male patient with a known history of [place] and PCI (most recent PATRIC to RCA 7/2022), DM, HTN, carotid stenosis, HLD, HFrEF who presented via ambulance from Urgent Care with bradycardia HR 30-40s. Prior to these events the patient was admitted to [facility] with epigastric pain. A CT scan was performed at the time of admission and patient was discharged. Continued to have vague epigastric pain and went to Urgent Care where he was found to have atrial flutter and rates in the 30-40s. He has been known to have a low EF since 12/2022 and was placed on GDMT after a diagnostic LHC which did not demonstrate any new blockages. Known to have moderate to severe MR also being managed medically. While in ICU patient was noted to have multiple pauses of 10-20 second in duration associated with near syncope. Patient was noted to have accidentally taken a dose of Lopressor at the time of presentation which was discontinued by his Cardiologist. Per records has been in Aflutter since 7/28/23 and on Eliquis 2.5mg PO BID as well.      Patient's Cardiologist is from [facility].   PAST MEDICAL & SURGICAL HISTORY:  High cholesterol  Diabetes  MI, old  HTN (hypertension)  GERD (gastroesophageal reflux disease)  Arthritis  S/P CABG (coronary artery bypass graft)  History of coronary artery stent placement    REVIEW OF SYSTEMS  General: - fever or chills, + fatiuge	  Skin/Breast: - rashes  Ophthalmologic: - blurred vision  ENMT: - sore throat  Respiratory and Thorax: + dyspnea	  Cardiovascular: + syncope, - palpitations, + chest discomfort  Gastrointestinal:	 + abdominal discomfort  Genitourinary: - dysuria  Musculoskeletal:	 + arthritis  Neurological: - weaknesses  Psychiatric: + anxiety  Hematology/Lymphatics: - bleeding disorders	  Endocrine: - heat or cold intolerance    MEDICATIONS  (STANDING):  aspirin  chewable 81 milliGRAM(s) Oral daily  atorvastatin 80 milliGRAM(s) Oral at bedtime  brimonidine 0.2% Ophthalmic Solution 1 Drop(s) Both EYES two times a day  chlorhexidine 2% Cloths 1 Application(s) Topical <User Schedule>  clopidogrel Tablet 75 milliGRAM(s) Oral daily  DOPamine Infusion 10 MICROgram(s)/kG/Min (24.7 mL/Hr) IV Continuous <Continuous>  heparin   Injectable 5000 Unit(s) SubCutaneous every 8 hours  insulin lispro (ADMELOG) corrective regimen sliding scale   SubCutaneous three times a day before meals  insulin lispro (ADMELOG) corrective regimen sliding scale   SubCutaneous at bedtime  pantoprazole    Tablet 40 milliGRAM(s) Oral before breakfast  polyethylene glycol 3350 17 Gram(s) Oral daily    MEDICATIONS  (PRN):  hydrALAZINE Injectable 10 milliGRAM(s) IV Push every 6 hours PRN SBP > 180    Allergies  No Known Allergies    SOCIAL HISTORY: non smoker, non drinker, 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   Pulmonary: Coarse breath sounds with crackles at bases  Abdomen: +BS, soft NTND  Extremities: no edema b/l,   Neuro: non focal, speech clear, MILLS x 4  Psych: Appropriate mood and affect.     LABS:                        14.1   11.23 )-----------( 168      ( 31 Jul 2023 03:15 )             43.1     133<L>  |  98  |  30.7<H>  ----------------------------<  88  5.1   |  22.0  |  1.24  Ca    9.0      31 Jul 2023 03:15  Phos  4.8     07-31  Mg     1.9     07-31  TPro  6.1<L>  /  Alb  3.3  /  TBili  0.8  /  DBili  x   /  AST  36  /  ALT  27  /  AlkPhos  73  07-31  LIVER FUNCTIONS - ( 31 Jul 2023 03:15 )  Alb: 3.3 g/dL / Pro: 6.1 g/dL / ALK PHOS: 73 U/L / ALT: 27 U/L / AST: 36 U/L / GGT: x         PT/INR - ( 30 Jul 2023 19:50 )   PT: 12.5 sec;   INR: 1.13 ratio    PTT - ( 30 Jul 2023 19:50 )  PTT:24.9 secCARDIAC MARKERS ( 31 Jul 2023 06:15 )  x     / 0.20 ng/mL / 74 U/L / x     / x      CARDIAC MARKERS ( 30 Jul 2023 19:50 )  x     / 0.02 ng/mL / 99 U/L / x     / x      CARDIAC MARKERS ( 30 Jul 2023 13:00 )  x     / 0.02 ng/mL / x     / x     / x        Urinalysis Basic - ( 31 Jul 2023 03:15 )  Color: x / Appearance: x / SG: x / pH: x  Gluc: 88 mg/dL / Ketone: x  / Bili: x / Urobili: x   Blood: x / Protein: x / Nitrite: x   Leuk Esterase: x / RBC: x / WBC x   Sq Epi: x / Non Sq Epi: x / Bacteria: x    RADIOLOGY & ADDITIONAL STUDIES:  [facility]: 12/13/22  Congestive heart failure, acute on chronic systolic   Diagnostic Conclusions:   Patent stents in the native RCA and the mid LAD after the LIMA  anastomosis.  Reduced EF with moderate to severe Mitral regurgitation and moderate  Aortic Insufficiency.  Recommendations:   Medical therapy of valvular heart disease.     TTE 12/12/22  Summary:   1. Mildly enlarged left atrium.   2. Segmental wall motion abnormalities in LCx and RCA territory.   3. Left ventricular ejection fraction, by visual estimation, is 30 to 35%.   4. Moderately enlarged right atrium.   5. Moderately enlarged right ventricle. Moderately reduced RVsystolic function.   6. Moderate mitral valve regurgitation.   7. Moderate aortic regurgitation.   8. Estimated pulmonary artery systolic pressure is 69.0 mmHg -severe pulmonary hypertension.   9. Dilatation of the aortic root.  10. There is no evidence of pericardial effusion.  11. ER team informed of the findings.    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 No fluid   collection.    EKG:     A/P       History obtained from patient, wife Joleen and Daughter Destiny at bedside    This is a very pleasant 89 year old male patient with a [place] and PCI (most recent PATRIC to RCA 7/2022), DM, HTN, carotid stenosis, HLD, HFrEF who presented via ambulance from Urgent Care with bradycardia HR 30-40s. Prior to these events the patient was admitted to [facility] with epigastric pain. A CT scan was performed at the time of admission and patient was discharged. Continued to have vague epigastric pain and went to Urgent Care where he was found to have atrial flutter and rates in the 30-40s. He has been known to have a low EF since 12/2022 and was placed on GDMT after a diagnostic LHC which did not demonstrate any new blockages. Known to have moderate to severe MR also being managed medically. While in ICU patient was noted to have multiple pauses of 10-20 second in duration associated with near syncope. Patient was noted to have accidentally taken a dose of Lopressor at the time of presentation which was discontinued by his Cardiologist. Per records has been in Aflutter since 7/28/23 and on Eliquis 2.5mg PO BID as well.      Patient's Cardiologist is from [facility].   PAST MEDICAL & SURGICAL HISTORY:  High cholesterol  Diabetes  MI, old  HTN (hypertension)  GERD (gastroesophageal reflux disease)  Arthritis  S/P CABG (coronary artery bypass graft)  History of coronary artery stent placement    REVIEW OF SYSTEMS  General: - fever or chills, + fatiuge	  Skin/Breast: - rashes  Ophthalmologic: - blurred vision  ENMT: - sore throat  Respiratory and Thorax: + dyspnea	  Cardiovascular: + syncope, - palpitations, + chest discomfort  Gastrointestinal:	 + abdominal discomfort  Genitourinary: - dysuria  Musculoskeletal:	 + arthritis  Neurological: - weaknesses  Psychiatric: + anxiety  Hematology/Lymphatics: - bleeding disorders	  Endocrine: - heat or cold intolerance    MEDICATIONS  (STANDING):  aspirin  chewable 81 milliGRAM(s) Oral daily  atorvastatin 80 milliGRAM(s) Oral at bedtime  brimonidine 0.2% Ophthalmic Solution 1 Drop(s) Both EYES two times a day  chlorhexidine 2% Cloths 1 Application(s) Topical <User Schedule>  clopidogrel Tablet 75 milliGRAM(s) Oral daily  DOPamine Infusion 10 MICROgram(s)/kG/Min (24.7 mL/Hr) IV Continuous <Continuous>  heparin   Injectable 5000 Unit(s) SubCutaneous every 8 hours  insulin lispro (ADMELOG) corrective regimen sliding scale   SubCutaneous three times a day before meals  insulin lispro (ADMELOG) corrective regimen sliding scale   SubCutaneous at bedtime  pantoprazole    Tablet 40 milliGRAM(s) Oral before breakfast  polyethylene glycol 3350 17 Gram(s) Oral daily    MEDICATIONS  (PRN):  hydrALAZINE Injectable 10 milliGRAM(s) IV Push every 6 hours PRN SBP > 180    Allergies  No Known Allergies    SOCIAL HISTORY: non smoker, non drinker, 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   Pulmonary: Coarse breath sounds with crackles at bases  Abdomen: +BS, soft NTND  Extremities: no edema b/l,   Neuro: non focal, speech clear, MILLS x 4  Psych: Appropriate mood and affect.     LABS:                        14.1   11.23 )-----------( 168      ( 31 Jul 2023 03:15 )             43.1     133<L>  |  98  |  30.7<H>  ----------------------------<  88  5.1   |  22.0  |  1.24  Ca    9.0      31 Jul 2023 03:15  Phos  4.8     07-31  Mg     1.9     07-31  TPro  6.1<L>  /  Alb  3.3  /  TBili  0.8  /  DBili  x   /  AST  36  /  ALT  27  /  AlkPhos  73  07-31  LIVER FUNCTIONS - ( 31 Jul 2023 03:15 )  Alb: 3.3 g/dL / Pro: 6.1 g/dL / ALK PHOS: 73 U/L / ALT: 27 U/L / AST: 36 U/L / GGT: x         PT/INR - ( 30 Jul 2023 19:50 )   PT: 12.5 sec;   INR: 1.13 ratio    PTT - ( 30 Jul 2023 19:50 )  PTT:24.9 secCARDIAC MARKERS ( 31 Jul 2023 06:15 )  x     / 0.20 ng/mL / 74 U/L / x     / x      CARDIAC MARKERS ( 30 Jul 2023 19:50 )  x     / 0.02 ng/mL / 99 U/L / x     / x      CARDIAC MARKERS ( 30 Jul 2023 13:00 )  x     / 0.02 ng/mL / x     / x     / x        Urinalysis Basic - ( 31 Jul 2023 03:15 )  Color: x / Appearance: x / SG: x / pH: x  Gluc: 88 mg/dL / Ketone: x  / Bili: x / Urobili: x   Blood: x / Protein: x / Nitrite: x   Leuk Esterase: x / RBC: x / WBC x   Sq Epi: x / Non Sq Epi: x / Bacteria: x    RADIOLOGY & ADDITIONAL STUDIES:  [facility]: 12/13/22  Congestive heart failure, acute on chronic systolic   Diagnostic Conclusions:   Patent stents in the native RCA and the mid LAD after the LIMA  anastomosis.  Reduced EF with moderate to severe Mitral regurgitation and moderate  Aortic Insufficiency.  Recommendations:   Medical therapy of valvular heart disease.     TTE 12/12/22  Summary:   1. Mildly enlarged left atrium.   2. Segmental wall motion abnormalities in LCx and RCA territory.   3. Left ventricular ejection fraction, by visual estimation, is 30 to 35%.   4. Moderately enlarged right atrium.   5. Moderately enlarged right ventricle. Moderately reduced RVsystolic function.   6. Moderate mitral valve regurgitation.   7. Moderate aortic regurgitation.   8. Estimated pulmonary artery systolic pressure is 69.0 mmHg -severe pulmonary hypertension.   9. Dilatation of the aortic root.  10. There is no evidence of pericardial effusion.  11. ER team informed of the findings.    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 No fluid   collection.    EKG: Aflutter with HR in the 59bpm; QRSD 96ms    A/P  89 year old male patient with a known [place] and PCI (most recent PATRIC to RCA 7/2022), DM, HTN, carotid stenosis, HLD, HFrEF who is admitted with slow atrial flutter with rates in the 40s with frequent pauses up to 20 seconds in duration associated with symptoms of presyncope and syncope    EF at bedside preliminary is low in the 30s range.     - Awaiting official TTE read   - Hold AV tariq blocking agents  - Hold Eliquis for now. Will resume after invasive procedures  - Continue dopamine gtt  - Type and screen, 2 units of PRBCs on hold, consent obtained.  - Plan for SHAUNA + EPS/RFA of atrial flutter + leadless pacemaker insertion with ILR. Patient, spouse and daughter agreeable. Will forgo ICD at this time and repeat Echo as outpatient.   - Full recommendations to follow below

## 2023-07-31 NOTE — CONSULT NOTE ADULT - SUBJECTIVE AND OBJECTIVE BOX
[facility] PALLIATIVE MEDICINE CONSULT    CC: Patient is a 89y old  Male who presents with a chief complaint of High grade vs CHB (31 Jul 2023 11:11)      HPI:    Mr. Lees is a 89 year old male with PMHx of CAD s/p CABG and PCI, DM, HTN, HLD, JR, HFrEF, severe pHTN, sent from urgent care after found with bradycardia HR 30-40s. Administered atropine 1mg x2 en route to ED without effect. Per patient, he accidentally took a dose of metoprolol this morning although it was recently discontinued by his cardiologist. Of note, pt was seen in the ED on 7/28 for abdominal pain and nausea. At that time, he was evaluated by cardiology for acute CHF and new onset Aflutter. His home metoprolol was discontinued and TTE was ordered but pt signed out AMA before workup could be completed. In the ER this admission, pt was noted to be in high grade AV block vs CHB. Evaluated by Cardiology and deemed no acute need for TVP; planned for EP eval for possible PPM. Admitted to MICU for further workup and management of acute respiratory failure 2/2 acute decompensated HFrEF and AVB, . Palliative consulted for support and to assist with onoging goals of care.     Pt seen at bedside with RN; multiple family members including wife were present. Telemetry monitor noted with persistent bradycardia with episodes of asystole (since this AM). MICU aware and to follow up with EP team regarding further recommendations. Pt was already seen by EP earlier this AM, planned for "SHAUNA + EPS/RFA of atrial flutter + leadless pacemaker insertion with ILR"      Present Symptoms:     Dyspnea: yes, intermittent  Nausea/Vomiting:  yes intermittent  Anxiety:  Yes due to episodes of bradycardia  Depression: Yes due to situation  Fatigue: Yes    Loss of appetite: NPO  Constipation:  None documented     Pain: No            Character-            Duration-            Effect-            Factors-            Frequency-            Location-            Severity-    Pain AD Score:  [email or web address] (press ctrl + left click to view)    Review of Systems: Reviewed, see above     Unable to obtain due to poor mentation     PERTINENT PMH REVIEWED: Yes      PAST MEDICAL & SURGICAL HISTORY:  High cholesterol  Diabetes  MI, old  HTN (hypertension)  GERD (gastroesophageal reflux disease)  Arthritis    S/P CABG (coronary artery bypass graft)  History of coronary artery stent placement    FAMILY HISTORY:  FH: hypertension    Allergies    No Known Allergies    Intolerances    SOCIAL HISTORY:                      Substance history:                    Admitted from:  home with wife                    Children:                     Taoism/spirituality:                    Cultural concerns:       DECISION MAKER(s):[] Health Care Proxy(s)  [] Surrogate(s)  [] Guardian           - surrogate is wife Radha    ADVANCE DIRECTIVES/TREATMENT PREFERENCES: FULL CODE    Karnofsky/Palliative Performance Status Version 2:  %  http://npcrc.org/files/news/palliative_performance_scale_[email or web address].pdf    Baseline ADLs (prior to admission): fully independent prior to admission      MEDICATIONS  (STANDING):  aspirin  chewable 81 milliGRAM(s) Oral daily  atorvastatin 80 milliGRAM(s) Oral at bedtime  brimonidine 0.2% Ophthalmic Solution 1 Drop(s) Both EYES two times a day  chlorhexidine 2% Cloths 1 Application(s) Topical <User Schedule>  clopidogrel Tablet 75 milliGRAM(s) Oral daily  DOPamine Infusion 10 MICROgram(s)/kG/Min (24.7 mL/Hr) IV Continuous <Continuous>  EPINEPHrine    Infusion 0.125 MICROgram(s)/kG/Min (30.8 mL/Hr) IV Continuous <Continuous>  heparin   Injectable 5000 Unit(s) SubCutaneous every 8 hours  insulin lispro (ADMELOG) corrective regimen sliding scale   SubCutaneous at bedtime  insulin lispro (ADMELOG) corrective regimen sliding scale   SubCutaneous three times a day before meals  pantoprazole    Tablet 40 milliGRAM(s) Oral before breakfast  polyethylene glycol 3350 17 Gram(s) Oral daily    MEDICATIONS  (PRN):  hydrALAZINE Injectable 10 milliGRAM(s) IV Push every 6 hours PRN SBP > 180      PHYSICAL EXAM:    Vital Signs Last 24 Hrs  T(C): 37.3 (31 Jul 2023 10:00), Max: 37.7 (31 Jul 2023 06:30)  T(F): 99.1 (31 Jul 2023 10:00), Max: 99.9 (31 Jul 2023 06:30)  HR: 30 (31 Jul 2023 10:00) (30 - 83)  BP: 128/44 (31 Jul 2023 10:00) (103/42 - 189/84)  BP(mean): 66 (31 Jul 2023 10:00) (47 - 112)  RR: 16 (31 Jul 2023 10:00) (12 - 26)  SpO2: 100% (31 Jul 2023 10:00) (92% - 100%)    Parameters below as of 31 Jul 2023 04:00  Patient On (Oxygen Delivery Method): BiPAP/CPAP    General: appears uncomfortable and anxious    HEENT: mucous membrane moist      Lungs: comfortable nonlabored     CV: S1/S2. bradycardic, irregular    GI: +BS abdomen soft, nondistended, nontender    MSK:  no cyanosis or edema      Neuro: nonfocal. awake and alert    Skin: warm and dry.      Psych: anxious    LABS:                        14.1   11.23 )-----------( 168      ( 31 Jul 2023 03:15 )             43.1     07-31    133<L>  |  98  |  30.7<H>  ----------------------------<  88  5.1   |  22.0  |  1.24    Ca    9.0      31 Jul 2023 03:15  Phos  4.8     07-31  Mg     1.9     07-31    TPro  6.1<L>  /  Alb  3.3  /  TBili  0.8  /  DBili  x   /  AST  36  /  ALT  27  /  AlkPhos  73  07-31    PT/INR - ( 30 Jul 2023 19:50 )   PT: 12.5 sec;   INR: 1.13 ratio         PTT - ( 30 Jul 2023 19:50 )  PTT:24.9 sec  Urinalysis Basic - ( 31 Jul 2023 03:15 )    Color: x / Appearance: x / SG: x / pH: x  Gluc: 88 mg/dL / Ketone: x  / Bili: x / Urobili: x   Blood: x / Protein: x / Nitrite: x   Leuk Esterase: x / RBC: x / WBC x   Sq Epi: x / Non Sq Epi: x / Bacteria: x      I&O's Summary    30 Jul 2023 07:01  -  31 Jul 2023 07:00  --------------------------------------------------------  IN: 12.3 mL / OUT: 1865 mL / NET: -1852.7 mL    RADIOLOGY & ADDITIONAL STUDIES:    CXR    ACC: [id number] EXAM:  XR CHEST PORTABLE URGENT 1V   ORDERED BY: SILVERIO   DEVEN     PROCEDURE DATE:  07/28/2023          INTERPRETATION:  AP chest on July 28, 2023 10:40 AM. Patient has chest   pain.    Heart is enlarged. Sternotomy again noted.    On December 12, 2022 there is some atelectatic basal infiltrates on the   left which have largely cleared with slight linear residual.    IMPRESSION: Slight linear residual densities in the retrocardiac area. No   signs of acute finding.    --- End of Report ---  BRENT CHONG MD; Attending Radiologist  This document has been electronically signed. Jul 28 2023 11:21AM    CT A/P     ACC: [id number] EXAM:  CT ABDOMEN AND PELVIS   ORDERED BY: SILVERIO CARVALHO     PROCEDURE DATE:  07/28/2023          INTERPRETATION:  CLINICAL INFORMATION: Abdominal pain    COMPARISON: CT abdomen pelvis 3/12/2007    CONTRAST/COMPLICATIONS:  IV Contrast: NONE  Oral Contrast: NONE  Complications: None reported at time of study completion    PROCEDURE:  CT of the Abdomen and Pelvis was performed.  Sagittal and coronal reformats were performed.    FINDINGS:  LOWER CHEST: Small right and trace left pleural effusions. Cardiomegaly.    LIVER: Within normal limits.  BILE DUCTS: Normal caliber.  GALLBLADDER: Within normal limits.  SPLEEN: Within normal limits.  PANCREAS: Within normal limits.  ADRENALS: A 3 cm right adrenal adenoma.  KIDNEYS/URETERS: No renal stones or hydronephrosis.    BLADDER: Air in the bladder lumen.  REPRODUCTIVE ORGANS: Prostate is enlarged.    BOWEL: Diverticulosis coli. There is a 3.4 cm distal sigmoid diverticulum   with trace surrounding inflammation. No bowel obstruction. Appendix is   normal.  PERITONEUM: No free air, fluid collection, or ascites.  VESSELS: Within normal limits.  RETROPERITONEUM/LYMPH NODES: No lymphadenopathy.  ABDOMINAL WALL: Left inguinal hernia repair.  BONES: Degenerative changes of the spine. 11 mm anterolisthesis of L4 on   L5.    IMPRESSION:  Mild acute diverticulitis of a giant sigmoid diverticulum. No fluid   collection.    --- End of Report ---  GUSTAVO VALLADARES MD; Attending Radiologist  This document has been electronically signed. Jul 28 2023 11:06AM      NEUROLOGICAL MEDICATIONS/OPIOIDS/BENZODIAZEPINE OVER PAST 24 HOURS    morphine  - Injectable   2 milliGRAM(s) IV Push (07-30-23 @ 19:30)    morphine  - Injectable   2 milliGRAM(s) IV Push (07-30-23 @ 19:45)    ondansetron Injectable   4 milliGRAM(s) IV Push (07-31-23 @ 09:32)

## 2023-07-31 NOTE — PROGRESS NOTE ADULT - ASSESSMENT
89 year old patient of Dr Chino presents with high grade AVB. He was seen by me as ED consult July 28. He was found to have acute CHF, JAMARI, mild bradycardia in setting of new onset atrial flutter. He left AMA the same day. 7/29 evening returned with more significant bradycardia   - AVB with periods of asystole   - CHF, acute on chronic systolic - left AMA from ED  on 7/28   - CAD: s/p cath (12/13/2022) patent LIMA -> LAD, patent SVG - OM1, native vessels: 100%mLAD, patent mid/distal RCA stents, EF - 30 - 35%,  Moderate to severe MR, Moderate AR    Recommendations  Planned PPM vs ICD implant today by [facility] EP  Given active CHF, elevated BP on dopamine and epi, dobutamine seems more appropriate for now.

## 2023-07-31 NOTE — CONSULT NOTE ADULT - ASSESSMENT
89 year old male with hx of CAD s/p CABG and PCI, DM, HTN, HLD, JR, HFrEF, valvular disease, severe pHTN, chronic Aflutter on AC, recent ED visit 7/28 acute CHF exacerbation and new onset Aflutter but signed out AMA before workup could be completed now presents from urgent care for bradycardia HF 30-40s, admitted to MICU for acute respiratory failure 2/2 acute decompensated HFrEF and high grade AVB. Palliative consulted for support and to assist with ongoing goals of care.     Acute Decompensated HFrEF  High Grade AVB with Episodes of Asystole  CAD S/P CABG and PCI  - elevated BNP (8181 compared to 2K in Dec 2022)  - TTE 12/2022 with EF 30-35%, moderate MR and AR, severe pulm HTN  - TTE completed this admission, final read pending  - Cardio input appreciated  - EP input appreciated, planned for SHAUNA + EPS/RFA of Aflutter + PPM with ILR  - currently off both dopamine and EPI at time of visit    Acute Respiratory Failure   - continue with O2 support via NC     Acute Renal Failure  - serial Cr improved, monitor serial renal indices    Palliative Care Encounter  - surrogate is wife Radha  - Met with pt and family to introduce role and scope of palliative team. Family was guarded but expressed frustration with pt's persistent bradycardia and episodes of asystole, inquiring if PPM can be placed sooner. Reassurance provided that writer will reiterate to MICU their concerns. Emotional support provided. D/W MICU intensivist who is aware and to follow up with EP team again for any further recommendations. Palliative team will continue to support and follow clinical progress for ongoing goals of care.

## 2023-07-31 NOTE — PROGRESS NOTE ADULT - SUBJECTIVE AND OBJECTIVE BOX
ELECTROPHYSIOLOGY BRIEF POST-OP NOTE    I have personally seen and examined the patient today in cath lab holding area s/p procedure    PRE-OP DIAGNOSIS: Typical atrial flutter + CHB    POST-OP DIAGNOSIS: Same    PROCEDURE: SHAUNA + EPS/RFA of atrial flutter + Medtronic Micra Leadless PM implant + ILR implant    COMPLICATIONS: None    CARDIOMYOPATHY: YES - ICM    IMPLANT EF%: 35-40%    NYHA CLASS: II     BETA BLOCKER: No- Held for bradycardia    ACE/ARB: Entresto    Physician: Petty Sanchez MD  Assistant: None     ESTIMATED BLOOD LOSS: <15ml     ANESTHESIA TYPE:  [ x ]General Anesthesia  [   ]Sedation  [   ]Local/Regional    CONDITION:  [  ]Critical  [ x ]Serious  [  ]Stable  [  ]Good    SPECIMENS REMOVED (if applicable): None    IMPLANT (if applicable): Medtronic Micra Leadless pacemaker + Medtronic ILR    Programmed: VDD 50 with backup VVI 40    SHAUNA: No ED thrombus    EKG:     Vital Signs  HR:   BP:   RR: 18   SpO2: 98%     Physical Exam:  Constitutional: AAOx3, NAD, thin frail elderly appearing  Neck: supple, No JVD  Cardiovascular: +S1S2 RRR, 2/6 LEOPOLDO at LSB.   LSB: Loop insertion site with DermaBond CDI.   Pulmonary: Anterior breath sounds are coarse with crackles  Abdomen: +BS, soft NTND  Extremities: no edema b/l,   Right and left groin: Figure 8 sutures with dressing CDI: No evidence of hematomas   Neuro: non focal, speech clear, MILLS x 4  Psych: Appropriate mood and affect.     A/P  89 year old male patient with a known history of CAD s/p CABG (1995 [place]) and PCI (most recent PATRIC to RCA 7/2022), DM, HTN, carotid stenosis, HLD, HFrEF who is admitted with slow atrial flutter with rates in the 40s with frequent pauses up to 20 seconds in duration associated with symptoms of presyncope and syncope. Patient now s/p SHAUNA + EPS/RFA of typical atrial flutter (CTI line) + Medtronic Micra leadless pacemaker insertion via Right groin + Medtronic ILR insertion at left sternal border.     -Strict bedrest x 6 hours. Right knee immobilizer in place.   -Ancef 2gram IV Q8hr x 2 more doses  -Start Eliquis 5mg @ 20:00 and will consider switching to 2.5mg starting tomorrow AM  -Continue Plavix 75mg - may resume tomorrow or tonight.   -Chest X-ray PA and LAT in am to eval lead position  -Pending pacemaker performance will consider restarting Toprol XL tomorrow and GDMT for HF  -Medtronic check in AM  -AM labs and EKG  -Figure 8 sutures to be removed by EP staff in AM  -Will follow up in AM                                              ELECTROPHYSIOLOGY BRIEF POST-OP NOTE    I have personally seen and examined the patient today in cath lab holding area s/p procedure    PRE-OP DIAGNOSIS: Typical atrial flutter + CHB    POST-OP DIAGNOSIS: Same    PROCEDURE: SHAUNA + EPS/RFA of atrial flutter + Medtronic Micra Leadless PM implant + ILR implant - under deep sedation     COMPLICATIONS: None    CARDIOMYOPATHY: YES - ICM    IMPLANT EF%: 35-40%    NYHA CLASS: II     BETA BLOCKER: No- Held for bradycardia    ACE/ARB: Entresto - may be resumed tomorrow pending AM labs     Physician: Petty Sanchez MD  Assistant: None     ESTIMATED BLOOD LOSS: <15ml     ANESTHESIA TYPE:  [  ]General Anesthesia  [ x ]Sedation  [   ]Local/Regional    CONDITION:  [  ]Critical  [ x ]Serious  [  ]Stable  [  ]Good    SPECIMENS REMOVED (if applicable): None    IMPLANT (if applicable): Medtronic Micra Leadless pacemaker + Medtronic ILR    Programmed: VDD 50 with backup VVI 40    SHAUNA: No ED thrombus    EKG: pending    Vital Signs  HR: 63  BP: 118/52  RR: 18   SpO2: 98%     Physical Exam:  Constitutional: AAOx3, NAD, thin frail elderly appearing  Neck: supple, No JVD  Cardiovascular: +S1S2 RRR, 2/6 LEOPOLDO at LSB.   LSB: Loop insertion site with DermaBond CDI.   Pulmonary: Anterior breath sounds are coarse with crackles  Abdomen: +BS, soft NTND  Extremities: no edema b/l,   Right and left groin: Figure 8 sutures with dressing CDI: No evidence of hematomas   Neuro: non focal, speech clear, MILLS x 4  Psych: Appropriate mood and affect.     A/P  89 year old male patient with a known [place] and PCI (most recent PATRIC to RCA 7/2022), DM, HTN, carotid stenosis, HLD, HFrEF who is admitted with slow atrial flutter with rates in the 40s with frequent pauses up to 20 seconds in duration associated with symptoms of presyncope and syncope. Patient now s/p SHAUNA + EPS/RFA of typical atrial flutter (CTI line) + Medtronic Micra leadless pacemaker insertion via Right groin + Medtronic ILR insertion at left sternal border.     -Strict bedrest x 6 hours. Right knee immobilizer in place.   -Ancef 2gram IV Q8hr x 2 more doses  -Start Eliquis 5mg @ 20:00 and will consider switching to 2.5mg starting tomorrow AM  -Continue Plavix 75mg - may resume tomorrow or tonight.   -Chest X-ray PA and LAT in am to eval lead position  -Pending pacemaker performance will consider restarting Toprol XL tomorrow and GDMT for HF  -Medtronic check in AM  -AM labs and EKG  -Figure 8 sutures to be removed by EP staff in AM  -Right radial A line may be removed in ICU if BP stable   -Will follow up in AM                                              ELECTROPHYSIOLOGY BRIEF POST-OP NOTE    I have personally seen and examined the patient today in cath lab holding area s/p procedure    PRE-OP DIAGNOSIS: Typical atrial flutter + CHB    POST-OP DIAGNOSIS: Same    PROCEDURE: SHAUNA + EPS/RFA of atrial flutter + Medtronic Micra Leadless PM implant + ILR implant - under deep sedation     COMPLICATIONS: None    CARDIOMYOPATHY: YES - ICM    IMPLANT EF%: 35-40%    NYHA CLASS: II     BETA BLOCKER: No- Held for bradycardia    ACE/ARB: Entresto - may be resumed tomorrow pending AM labs     Physician: Petty Sanchez MD  Assistant: None     ESTIMATED BLOOD LOSS: <15ml     ANESTHESIA TYPE:  [  ]General Anesthesia  [ x ]Sedation  [   ]Local/Regional    CONDITION:  [  ]Critical  [ x ]Serious  [  ]Stable  [  ]Good    SPECIMENS REMOVED (if applicable): None    IMPLANT (if applicable): Medtronic Micra Leadless pacemaker + Medtronic ILR    Programmed: VDD 50 with backup VVI 40    SHAUNA: No ED thrombus    EKG: SR w/ prolonged AV delay (VT 424ms)    Vital Signs  HR: 63  BP: 118/52  RR: 18   SpO2: 98%     Physical Exam:  Constitutional: AAOx3, NAD, thin frail elderly appearing  Neck: supple, No JVD  Cardiovascular: +S1S2 RRR, 2/6 LEOPOLDO at LSB.   LSB: Loop insertion site with DermaBond CDI.   Pulmonary: Anterior breath sounds are coarse with crackles  Abdomen: +BS, soft NTND  Extremities: no edema b/l,   Right and left groin: Figure 8 sutures with dressing CDI: No evidence of hematomas   Neuro: non focal, speech clear, MILLS x 4  Psych: Appropriate mood and affect.     A/P  89 year old male patient with a known history of [place] ([place]) and PCI (most recent PATRIC to RCA 7/2022), DM, HTN, carotid stenosis, HLD, HFrEF who is admitted with slow atrial flutter with rates in the 40s with frequent pauses up to 20 seconds in duration associated with symptoms of presyncope and syncope. Patient now s/p SHAUNA + EPS/RFA of typical atrial flutter (CTI line) + Medtronic Micra leadless pacemaker insertion via Right groin + Medtronic ILR insertion at left sternal border.     -Strict bedrest x 6 hours. Right knee immobilizer in place.   -Ancef 2gram IV Q8hr x 2 more doses  -Start Eliquis 5mg @ 20:00 and will consider switching to 2.5mg starting tomorrow AM  -Continue Plavix 75mg - may resume tomorrow or tonight.   -Chest X-ray PA and LAT in am to eval lead position  -Pending pacemaker performance will consider restarting Toprol XL tomorrow and GDMT for HF  -Medtronic check in AM  -AM labs and EKG  -Figure 8 sutures to be removed by EP staff in AM  -Right radial A line may be removed in ICU if BP stable   -Will follow up in AM                                              ELECTROPHYSIOLOGY BRIEF POST-OP NOTE    I have personally seen and examined the patient today in cath lab holding area s/p procedure    PRE-OP DIAGNOSIS: Typical atrial flutter + CHB    POST-OP DIAGNOSIS: Same    PROCEDURE: SHAUNA + EPS/RFA of atrial flutter + Medtronic Micra Leadless PM implant + ILR implant - under GA    COMPLICATIONS: None    CARDIOMYOPATHY: YES - ICM    IMPLANT EF%: 35-40%    NYHA CLASS: II     BETA BLOCKER: No- Held for bradycardia    ACE/ARB: Entresto - may be resumed tomorrow pending AM labs     Physician: Petty Sanchez MD  Assistant: None     ESTIMATED BLOOD LOSS: <15ml     ANESTHESIA TYPE:  [  ]General Anesthesia  [ x ]Sedation  [   ]Local/Regional    CONDITION:  [  ]Critical  [ x ]Serious  [  ]Stable  [  ]Good    SPECIMENS REMOVED (if applicable): None    IMPLANT (if applicable): Medtronic Micra Leadless pacemaker + Medtronic ILR    Programmed: VDD 50 with backup VVI 40    SHAUNA: No ED thrombus    EKG: SR w/ prolonged AV delay (VA 424ms)    Vital Signs  HR: 63  BP: 118/52  RR: 18   SpO2: 98%     Physical Exam:  Constitutional: AAOx3, NAD, thin frail elderly appearing  Neck: supple, No JVD  Cardiovascular: +S1S2 RRR, 2/6 LEOPOLDO at LSB.   LSB: Loop insertion site with DermaBond CDI.   Pulmonary: Anterior breath sounds are coarse with crackles  Abdomen: +BS, soft NTND  Extremities: no edema b/l,   Right and left groin: Figure 8 sutures with dressing CDI: No evidence of hematomas   Neuro: non focal, speech clear, MILLS x 4  Psych: Appropriate mood and affect.     A/P  89 year old male patient with a known history of [place] and PCI (most recent PATRIC to RCA 7/2022), DM, HTN, carotid stenosis, HLD, HFrEF who is admitted with slow atrial flutter with rates in the 40s with frequent pauses up to 20 seconds in duration associated with symptoms of presyncope and syncope. Patient now s/p SHAUNA + EPS/RFA of typical atrial flutter (CTI line) + Medtronic Micra leadless pacemaker insertion via Right groin + Medtronic ILR insertion at left sternal border.     -Strict bedrest x 6 hours. Right knee immobilizer in place.   -Ancef 2gram IV Q8hr x 2 more doses  -Start Eliquis 5mg @ 20:00 and will consider switching to 2.5mg starting tomorrow AM  -Continue Plavix 75mg - may resume tomorrow or tonight.   -Chest X-ray PA and LAT in am to eval lead position  -Pending pacemaker performance will consider restarting Toprol XL tomorrow and GDMT for HF  -Medtronic check in AM  -AM labs and EKG  -Figure 8 sutures to be removed by EP staff in AM  -Right radial A line may be removed in ICU if BP stable   -Will follow up in AM

## 2023-08-01 ENCOUNTER — OFFICE (OUTPATIENT)
Dept: URBAN - METROPOLITAN AREA CLINIC 115 | Facility: CLINIC | Age: 88
Setting detail: OPHTHALMOLOGY
End: 2023-08-01

## 2023-08-01 ENCOUNTER — TRANSCRIPTION ENCOUNTER (OUTPATIENT)
Age: 88
End: 2023-08-01

## 2023-08-01 VITALS
HEART RATE: 69 BPM | DIASTOLIC BLOOD PRESSURE: 50 MMHG | OXYGEN SATURATION: 95 % | SYSTOLIC BLOOD PRESSURE: 136 MMHG | RESPIRATION RATE: 13 BRPM
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
## 2023-08-01 LAB
ALBUMIN SERPL ELPH-MCNC: 2.9 G/DL — LOW (ref 3.3–5.2)
ALP SERPL-CCNC: 61 U/L — SIGNIFICANT CHANGE UP (ref 40–120)
ALT FLD-CCNC: 16 U/L — SIGNIFICANT CHANGE UP
ANION GAP SERPL CALC-SCNC: 9 MMOL/L — SIGNIFICANT CHANGE UP (ref 5–17)
AST SERPL-CCNC: 18 U/L — SIGNIFICANT CHANGE UP
BASOPHILS # BLD AUTO: 0 K/UL — SIGNIFICANT CHANGE UP (ref 0–0.2)
BASOPHILS NFR BLD AUTO: 0 % — SIGNIFICANT CHANGE UP (ref 0–2)
BILIRUB SERPL-MCNC: 0.7 MG/DL — SIGNIFICANT CHANGE UP (ref 0.4–2)
BUN SERPL-MCNC: 24.8 MG/DL — HIGH (ref 8–20)
CALCIUM SERPL-MCNC: 7.8 MG/DL — LOW (ref 8.4–10.5)
CHLORIDE SERPL-SCNC: 105 MMOL/L — SIGNIFICANT CHANGE UP (ref 96–108)
CO2 SERPL-SCNC: 24 MMOL/L — SIGNIFICANT CHANGE UP (ref 22–29)
CREAT SERPL-MCNC: 0.95 MG/DL — SIGNIFICANT CHANGE UP (ref 0.5–1.3)
EGFR: 76 ML/MIN/1.73M2 — SIGNIFICANT CHANGE UP
EOSINOPHIL # BLD AUTO: 0 K/UL — SIGNIFICANT CHANGE UP (ref 0–0.5)
EOSINOPHIL NFR BLD AUTO: 0 % — SIGNIFICANT CHANGE UP (ref 0–6)
GLUCOSE BLDC GLUCOMTR-MCNC: 94 MG/DL — SIGNIFICANT CHANGE UP (ref 70–99)
GLUCOSE BLDC GLUCOMTR-MCNC: 94 MG/DL — SIGNIFICANT CHANGE UP (ref 70–99)
GLUCOSE SERPL-MCNC: 100 MG/DL — HIGH (ref 70–99)
HCT VFR BLD CALC: 36.1 % — LOW (ref 39–50)
HGB BLD-MCNC: 11.9 G/DL — LOW (ref 13–17)
LYMPHOCYTES # BLD AUTO: 1.75 K/UL — SIGNIFICANT CHANGE UP (ref 1–3.3)
LYMPHOCYTES # BLD AUTO: 27.8 % — SIGNIFICANT CHANGE UP (ref 13–44)
MAGNESIUM SERPL-MCNC: 1.7 MG/DL — SIGNIFICANT CHANGE UP (ref 1.6–2.6)
MCHC RBC-ENTMCNC: 30.1 PG — SIGNIFICANT CHANGE UP (ref 27–34)
MCHC RBC-ENTMCNC: 33 GM/DL — SIGNIFICANT CHANGE UP (ref 32–36)
MCV RBC AUTO: 91.2 FL — SIGNIFICANT CHANGE UP (ref 80–100)
MONOCYTES # BLD AUTO: 0.55 K/UL — SIGNIFICANT CHANGE UP (ref 0–0.9)
MONOCYTES NFR BLD AUTO: 8.7 % — SIGNIFICANT CHANGE UP (ref 2–14)
NEUTROPHILS # BLD AUTO: 3.94 K/UL — SIGNIFICANT CHANGE UP (ref 1.8–7.4)
NEUTROPHILS NFR BLD AUTO: 62.6 % — SIGNIFICANT CHANGE UP (ref 43–77)
NT-PROBNP SERPL-SCNC: 3707 PG/ML — HIGH (ref 0–300)
PHOSPHATE SERPL-MCNC: 3.3 MG/DL — SIGNIFICANT CHANGE UP (ref 2.4–4.7)
PLATELET # BLD AUTO: 144 K/UL — LOW (ref 150–400)
POTASSIUM SERPL-MCNC: 3.7 MMOL/L — SIGNIFICANT CHANGE UP (ref 3.5–5.3)
POTASSIUM SERPL-SCNC: 3.7 MMOL/L — SIGNIFICANT CHANGE UP (ref 3.5–5.3)
PROT SERPL-MCNC: 5.2 G/DL — LOW (ref 6.6–8.7)
RBC # BLD: 3.96 M/UL — LOW (ref 4.2–5.8)
RBC # FLD: 14.4 % — SIGNIFICANT CHANGE UP (ref 10.3–14.5)
SODIUM SERPL-SCNC: 138 MMOL/L — SIGNIFICANT CHANGE UP (ref 135–145)
TROPONIN T SERPL-MCNC: 0.29 NG/ML — HIGH (ref 0–0.06)
WBC # BLD: 6.29 K/UL — SIGNIFICANT CHANGE UP (ref 3.8–10.5)
WBC # FLD AUTO: 6.29 K/UL — SIGNIFICANT CHANGE UP (ref 3.8–10.5)

## 2023-08-01 PROCEDURE — 99285 EMERGENCY DEPT VISIT HI MDM: CPT

## 2023-08-01 PROCEDURE — 83605 ASSAY OF LACTIC ACID: CPT

## 2023-08-01 PROCEDURE — 82550 ASSAY OF CK (CPK): CPT

## 2023-08-01 PROCEDURE — 86900 BLOOD TYPING SEROLOGIC ABO: CPT

## 2023-08-01 PROCEDURE — 84443 ASSAY THYROID STIM HORMONE: CPT

## 2023-08-01 PROCEDURE — 93306 TTE W/DOPPLER COMPLETE: CPT

## 2023-08-01 PROCEDURE — 82962 GLUCOSE BLOOD TEST: CPT

## 2023-08-01 PROCEDURE — 93312 ECHO TRANSESOPHAGEAL: CPT

## 2023-08-01 PROCEDURE — 84132 ASSAY OF SERUM POTASSIUM: CPT

## 2023-08-01 PROCEDURE — 85018 HEMOGLOBIN: CPT

## 2023-08-01 PROCEDURE — C1764: CPT

## 2023-08-01 PROCEDURE — 87641 MR-STAPH DNA AMP PROBE: CPT

## 2023-08-01 PROCEDURE — C1894: CPT

## 2023-08-01 PROCEDURE — 83735 ASSAY OF MAGNESIUM: CPT

## 2023-08-01 PROCEDURE — 85025 COMPLETE CBC W/AUTO DIFF WBC: CPT

## 2023-08-01 PROCEDURE — 84295 ASSAY OF SERUM SODIUM: CPT

## 2023-08-01 PROCEDURE — 84484 ASSAY OF TROPONIN QUANT: CPT

## 2023-08-01 PROCEDURE — 99233 SBSQ HOSP IP/OBS HIGH 50: CPT

## 2023-08-01 PROCEDURE — 85014 HEMATOCRIT: CPT

## 2023-08-01 PROCEDURE — 93005 ELECTROCARDIOGRAM TRACING: CPT

## 2023-08-01 PROCEDURE — 93010 ELECTROCARDIOGRAM REPORT: CPT

## 2023-08-01 PROCEDURE — C1769: CPT

## 2023-08-01 PROCEDURE — 71046 X-RAY EXAM CHEST 2 VIEWS: CPT

## 2023-08-01 PROCEDURE — 85610 PROTHROMBIN TIME: CPT

## 2023-08-01 PROCEDURE — 94660 CPAP INITIATION&MGMT: CPT

## 2023-08-01 PROCEDURE — 82803 BLOOD GASES ANY COMBINATION: CPT

## 2023-08-01 PROCEDURE — 84100 ASSAY OF PHOSPHORUS: CPT

## 2023-08-01 PROCEDURE — 33285 INSJ SUBQ CAR RHYTHM MNTR: CPT

## 2023-08-01 PROCEDURE — 33274 TCAT INSJ/RPL PERM LDLS PM: CPT

## 2023-08-01 PROCEDURE — 82330 ASSAY OF CALCIUM: CPT

## 2023-08-01 PROCEDURE — 83880 ASSAY OF NATRIURETIC PEPTIDE: CPT

## 2023-08-01 PROCEDURE — 85027 COMPLETE CBC AUTOMATED: CPT

## 2023-08-01 PROCEDURE — 71045 X-RAY EXAM CHEST 1 VIEW: CPT

## 2023-08-01 PROCEDURE — 80048 BASIC METABOLIC PNL TOTAL CA: CPT

## 2023-08-01 PROCEDURE — 93320 DOPPLER ECHO COMPLETE: CPT

## 2023-08-01 PROCEDURE — 83036 HEMOGLOBIN GLYCOSYLATED A1C: CPT

## 2023-08-01 PROCEDURE — NO SHOW FE NO SHOW FEE: Performed by: OPHTHALMOLOGY

## 2023-08-01 PROCEDURE — 85730 THROMBOPLASTIN TIME PARTIAL: CPT

## 2023-08-01 PROCEDURE — 87640 STAPH A DNA AMP PROBE: CPT

## 2023-08-01 PROCEDURE — 82947 ASSAY GLUCOSE BLOOD QUANT: CPT

## 2023-08-01 PROCEDURE — 71046 X-RAY EXAM CHEST 2 VIEWS: CPT | Mod: 26

## 2023-08-01 PROCEDURE — 96374 THER/PROPH/DIAG INJ IV PUSH: CPT

## 2023-08-01 PROCEDURE — 82435 ASSAY OF BLOOD CHLORIDE: CPT

## 2023-08-01 PROCEDURE — 71045 X-RAY EXAM CHEST 1 VIEW: CPT | Mod: 26,XE

## 2023-08-01 PROCEDURE — 86850 RBC ANTIBODY SCREEN: CPT

## 2023-08-01 PROCEDURE — 93325 DOPPLER ECHO COLOR FLOW MAPG: CPT

## 2023-08-01 PROCEDURE — C1786: CPT

## 2023-08-01 PROCEDURE — 36415 COLL VENOUS BLD VENIPUNCTURE: CPT

## 2023-08-01 PROCEDURE — 80053 COMPREHEN METABOLIC PANEL: CPT

## 2023-08-01 PROCEDURE — 86901 BLOOD TYPING SEROLOGIC RH(D): CPT

## 2023-08-01 RX ORDER — METOPROLOL TARTRATE 50 MG
25 TABLET ORAL DAILY
Refills: 0 | Status: DISCONTINUED | OUTPATIENT
Start: 2023-08-01 | End: 2023-08-01

## 2023-08-01 RX ORDER — FUROSEMIDE 40 MG
40 TABLET ORAL DAILY
Refills: 0 | Status: DISCONTINUED | OUTPATIENT
Start: 2023-08-01 | End: 2023-08-01

## 2023-08-01 RX ORDER — CLOPIDOGREL BISULFATE 75 MG/1
1 TABLET, FILM COATED ORAL
Qty: 28 | Refills: 0
Start: 2023-08-01 | End: 2023-08-28

## 2023-08-01 RX ORDER — CHLORHEXIDINE GLUCONATE 213 G/1000ML
1 SOLUTION TOPICAL DAILY
Refills: 0 | Status: DISCONTINUED | OUTPATIENT
Start: 2023-08-01 | End: 2023-08-01

## 2023-08-01 RX ORDER — SACUBITRIL AND VALSARTAN 24; 26 MG/1; MG/1
1 TABLET, FILM COATED ORAL
Refills: 0 | Status: DISCONTINUED | OUTPATIENT
Start: 2023-08-01 | End: 2023-08-01

## 2023-08-01 RX ORDER — MELOXICAM 15 MG/1
1 TABLET ORAL
Refills: 0 | DISCHARGE

## 2023-08-01 RX ORDER — LOSARTAN POTASSIUM 100 MG/1
1 TABLET, FILM COATED ORAL
Refills: 0 | DISCHARGE

## 2023-08-01 RX ORDER — POTASSIUM CHLORIDE 20 MEQ
1 PACKET (EA) ORAL
Refills: 0 | DISCHARGE

## 2023-08-01 RX ORDER — APIXABAN 2.5 MG/1
5 TABLET, FILM COATED ORAL
Refills: 0 | Status: DISCONTINUED | OUTPATIENT
Start: 2023-08-01 | End: 2023-08-01

## 2023-08-01 RX ORDER — ATORVASTATIN CALCIUM 80 MG/1
1 TABLET, FILM COATED ORAL
Qty: 0 | Refills: 0 | DISCHARGE

## 2023-08-01 RX ORDER — BRIMONIDINE TARTRATE 2 MG/MG
1 SOLUTION/ DROPS OPHTHALMIC
Qty: 0 | Refills: 0 | DISCHARGE

## 2023-08-01 RX ORDER — APIXABAN 2.5 MG/1
1 TABLET, FILM COATED ORAL
Qty: 0 | Refills: 0 | DISCHARGE
Start: 2023-08-01

## 2023-08-01 RX ORDER — TIMOLOL 0.5 %
1 DROPS OPHTHALMIC (EYE)
Qty: 0 | Refills: 0 | DISCHARGE

## 2023-08-01 RX ORDER — FUROSEMIDE 40 MG
1 TABLET ORAL
Qty: 0 | Refills: 0 | DISCHARGE

## 2023-08-01 RX ORDER — APIXABAN 2.5 MG/1
1 TABLET, FILM COATED ORAL
Qty: 56 | Refills: 0
Start: 2023-08-01 | End: 2023-08-28

## 2023-08-01 RX ORDER — POLYETHYLENE GLYCOL 3350 17 G/17G
17 POWDER, FOR SOLUTION ORAL
Qty: 0 | Refills: 0 | DISCHARGE
Start: 2023-08-01

## 2023-08-01 RX ORDER — BRIMONIDINE TARTRATE 2 MG/MG
1 SOLUTION/ DROPS OPHTHALMIC
Refills: 0 | DISCHARGE

## 2023-08-01 RX ORDER — GABAPENTIN 400 MG/1
1 CAPSULE ORAL
Refills: 0 | DISCHARGE

## 2023-08-01 RX ADMIN — PANTOPRAZOLE SODIUM 40 MILLIGRAM(S): 20 TABLET, DELAYED RELEASE ORAL at 08:38

## 2023-08-01 RX ADMIN — APIXABAN 5 MILLIGRAM(S): 2.5 TABLET, FILM COATED ORAL at 11:40

## 2023-08-01 RX ADMIN — BRIMONIDINE TARTRATE 1 DROP(S): 2 SOLUTION/ DROPS OPHTHALMIC at 05:23

## 2023-08-01 RX ADMIN — Medication 25 MILLIGRAM(S): at 11:41

## 2023-08-01 RX ADMIN — Medication 2000 MILLIGRAM(S): at 05:22

## 2023-08-01 RX ADMIN — CLOPIDOGREL BISULFATE 75 MILLIGRAM(S): 75 TABLET, FILM COATED ORAL at 11:35

## 2023-08-01 RX ADMIN — Medication 40 MILLIGRAM(S): at 11:40

## 2023-08-01 RX ADMIN — POLYETHYLENE GLYCOL 3350 17 GRAM(S): 17 POWDER, FOR SOLUTION ORAL at 00:21

## 2023-08-01 RX ADMIN — CHLORHEXIDINE GLUCONATE 1 APPLICATION(S): 213 SOLUTION TOPICAL at 05:22

## 2023-08-01 NOTE — PROGRESS NOTE ADULT - SUBJECTIVE AND OBJECTIVE BOX
Patient was seen and examined. He was without symptoms. States he generally feels better.     Other atrioventricular block    FH: hypertension    Handoff    MEWS Score    High cholesterol    Diabetes    MI, old    HTN (hypertension)    GERD (gastroesophageal reflux disease)    Arthritis    Catheter ablation of cavo-tricuspid isthmus for atrial flutter    High degree atrioventricular block    Bradycardia    HFrEF (heart failure with reduced ejection fraction)    Insertion, implantable loop recorder    S/P CABG (coronary artery bypass graft)    History of coronary artery stent placement    CHEST PAIN    1    Insertion, pacemaker, leadless    [email or web address]        apixaban 5 milliGRAM(s) Oral two times a day  atorvastatin 80 milliGRAM(s) Oral at bedtime  brimonidine 0.2% Ophthalmic Solution 1 Drop(s) Both EYES two times a day  clopidogrel Tablet 75 milliGRAM(s) Oral daily  furosemide    Tablet 40 milliGRAM(s) Oral daily  insulin lispro (ADMELOG) corrective regimen sliding scale   SubCutaneous three times a day before meals  insulin lispro (ADMELOG) corrective regimen sliding scale   SubCutaneous at bedtime  metoprolol succinate ER 25 milliGRAM(s) Oral daily  pantoprazole    Tablet 40 milliGRAM(s) Oral before breakfast  polyethylene glycol 3350 17 Gram(s) Oral daily  sacubitril 24 mG/valsartan 26 mG 1 Tablet(s) Oral two times a day      T(C): 37.1 (08-01-23 @ 10:00), Max: 37.4 (07-31-23 @ 12:00)  HR: 78 (08-01-23 @ 10:00) (41 - 80)  BP: 118/52 (07-31-23 @ 17:15) (118/52 - 148/39)  RR: 18 (08-01-23 @ 11:00) (7 - 25)  SpO2: 95% (08-01-23 @ 11:00) (90% - 99%)    07-31-23 @ 07:01  -  08-01-23 @ 07:00  --------------------------------------------------------  IN: 0 mL / OUT: 750 mL / NET: -750 mL    08-01-23 @ 07:01  -  08-01-23 @ 11:28  --------------------------------------------------------  IN: 0 mL / OUT: 150 mL / NET: -150 mL        Gen: no distress  CV: normal S1 and S2  Resp: Lungs CTA      08-01    138  |  105  |  24.8<H>  ----------------------------<  100<H>  3.7   |  24.0  |  0.95    Ca    7.8<L>      01 Aug 2023 03:50  Phos  3.3     08-01  Mg     1.7     08-01    TPro  5.2<L>  /  Alb  2.9<L>  /  TBili  0.7  /  DBili  x   /  AST  18  /  ALT  16  /  AlkPhos  61  08-01                            11.9   6.29  )-----------( 144      ( 01 Aug 2023 03:50 )             36.1

## 2023-08-01 NOTE — DIETITIAN INITIAL EVALUATION ADULT - PERTINENT MEDS FT
MEDICATIONS  (STANDING):  atorvastatin 80 milliGRAM(s) Oral at bedtime  brimonidine 0.2% Ophthalmic Solution 1 Drop(s) Both EYES two times a day  chlorhexidine 2% Cloths 1 Application(s) Topical <User Schedule>  clopidogrel Tablet 75 milliGRAM(s) Oral daily  insulin lispro (ADMELOG) corrective regimen sliding scale   SubCutaneous at bedtime  insulin lispro (ADMELOG) corrective regimen sliding scale   SubCutaneous three times a day before meals  pantoprazole    Tablet 40 milliGRAM(s) Oral before breakfast  polyethylene glycol 3350 17 Gram(s) Oral daily    MEDICATIONS  (PRN):  hydrALAZINE Injectable 10 milliGRAM(s) IV Push every 6 hours PRN SBP > 180

## 2023-08-01 NOTE — DISCHARGE NOTE PROVIDER - DETAILS OF MALNUTRITION DIAGNOSIS/DIAGNOSES
This patient has been assessed with a concern for Malnutrition and was treated during this hospitalization for the following Nutrition diagnosis/diagnoses:     -  08/01/2023: Moderate protein-calorie malnutrition

## 2023-08-01 NOTE — PROGRESS NOTE ADULT - NS ATTEND AMEND GEN_ALL_CORE FT
Seen and examined, agree with above. Discussed the AC dosing and anti-thrombotic management in length. He had PCI last year and was on DAPT + low dose eliquis. 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 He had no prior bleeding and prefers to prioritize stroke protection over prevention of bleeding which is reasonable. Explained that he will likely be able to come off AC in the future but must be on strict AC for at least one month. He has ILR in place to monitor for AFL recurrence and AF in the future.   CXR, EKG, Tele, groins and ILR sites are all WNL. Can resume toprol for HFrEF. Can go home later today and f/u as o/p.

## 2023-08-01 NOTE — DISCHARGE NOTE NURSING/CASE MANAGEMENT/SOCIAL WORK - NSDCPEFALRISK_GEN_ALL_CORE
For information on Fall & Injury Prevention, visit: [email or web address] OR  [email or web address] OR  https://www.cdc.gov/steadi/patient.html

## 2023-08-01 NOTE — PROGRESS NOTE ADULT - ASSESSMENT
89M PMH CAD s/p CABG/PCI, carotid artery stenosis, HFrEF, HTN, HLD, DM who presented 7/30/23 from urgent care where he was found bradycardic to HR 30s-40s, found with high-grade AV block/CHB s/p Micra leadless PPM placement 7/31/23     89M PMH CAD s/p CABG/PCI, carotid artery stenosis, HFrEF, HTN, HLD, DM who presented 7/30/23 from urgent care where he was found bradycardic to HR 30s-40s, found with high-grade AV block/CHB s/p Micra leadless PPM placement 7/31/23      AFlutter s/p alblation  AV conduction disorder/CHB - s/p Micra  CAD s/p CABG  HFrEF    - S/p Micra placement 7/31/23  - CXR PA/lateral this morning without pneumothorax  - Starting Eliquis 5mg BID today  - C/w Plavix 75mg QDaily, Toprol 25mg QDaly  - F/u with cardiology and EP as outpatient, close f/u  - Pt stable for discharge      Silas Clancy M.D.  , Pulmonary & Critical Care Medicine  [facility]  Pulmonary and Sleep Medicine at [place]  [address]  [place] [address]  T: [phone]  F: [phone]

## 2023-08-01 NOTE — DISCHARGE NOTE PROVIDER - NSDCCPTREATMENT_GEN_ALL_CORE_FT
PRINCIPAL PROCEDURE  Procedure: Catheter ablation of cavo-tricuspid isthmus for atrial flutter  Findings and Treatment: - please continue Eliquis as prescribed, do not hold of discontinue without discusing this with your electrophysiologist  - Bruising at the groin, sometimes extending down the leg, and/or a small lump under the skin at the groin access site is normal and will resolve within 2 – 3 weeks.   - Occasional skipped beats or palpitations that last for a few beats are common and generally resolve within 1-2 months.   - You make walk and take stairs at a regular pace.   - Do not perform any exercise more strenuous than walking for 1 week.   - Do not strain or lift heavy objects for 1 week.  - You may shower the day after the procedure.  - Do not soak in water (such as tub baths, hot tubs, swimming, etc.) for 1 week.   - You may resume all other activities the day after the procedure.  Call your doctor if:   - you notice bleeding, redness, drainage, swelling, increased tenderness or a hot sensation around the catheter insertion site.   - your temperature is greater than 100 degrees F for more than 24 hours.  - your rapid heart rhythm returns.  - you have any questions or concerns regarding the procedure.  If significant bleeding and/or a large lump (the size of a golf ball or bigger) occurs:  - Lie flat and apply continuous direct pressure just above the puncture site for at least 10 minutes  - If the issue resolves, notify your physician immediately.    - If the bleeding cannot be controlled, please seek immediate medical attention.  If you experience increased difficulty breathing or chest pain, or if you faint or have dizzy spells, please seek immediate medical attention.        SECONDARY PROCEDURE  Procedure: Insertion, pacemaker, leadless  Findings and Treatment: - Bruising at the groin, sometimes extending down the leg, and/or a small lump under the skin at the groin access site is normal and will resolve within 2 – 3 weeks.   - Occasional skipped beats or palpitations that last for a few beats are common and generally resolve within 1-2 months.   - You make walk and take stairs at a regular pace.   - Do not perform any exercise more strenuous than walking for 1 week.   - Do not strain or lift heavy objects for 1 week.  - You may shower the day after the procedure.  - Do not soak in water (such as tub baths, hot tubs, swimming, etc.) for 1 week.   - You may resume all other activities the day after the procedure.  Call your doctor if:   - you notice bleeding, redness, drainage, swelling, increased tenderness or a hot sensation around the catheter insertion site.   - your temperature is greater than 100 degrees F for more than 24 hours.  - your rapid heart rhythm returns.  - you have any questions or concerns regarding the procedure.  If significant bleeding and/or a large lump (the size of a golf ball or bigger) occurs:  - Lie flat and apply continuous direct pressure just above the puncture site for at least 10 minutes  - If the issue resolves, notify your physician immediately.    - If the bleeding cannot be controlled, please seek immediate medical attention.  If you experience increased difficulty breathing or chest pain, or if you faint or have dizzy spells, please seek immediate medical attention.

## 2023-08-01 NOTE — DIETITIAN INITIAL EVALUATION ADULT - OTHER INFO
Pt is a 89 year old M with CAD s/p CABG and PCI, DM, HTN, carotid stenosis, HFrEF, HLD admitted for to the MICU for symptomatic bradycardia unresponsive to atropine. Patients cardiologist had DCed metoprolol but pt had accidentally taken morning of presentation. Found to be in high grade AV block vs CHB with rates between 30-40. Last echo in 12/2022 showed EF 30-35%. Patient was placed on dopamine with improvement of rate to the 50s. Case was discussed with EP and planning for pacemaker later today noted.

## 2023-08-01 NOTE — PROGRESS NOTE ADULT - PROVIDER SPECIALTY LIST ADULT
Critical Care
Electrophysiology
Palliative Care
Cardiology
Cardiology
Electrophysiology
Critical Care
MICU

## 2023-08-01 NOTE — PROGRESS NOTE ADULT - SUBJECTIVE AND OBJECTIVE BOX
EP follow up: POD #1 s/p atrial flutter ablation (CTI line), MDT Criselda AV leadless PPM implant and MDT ILR implant w/ Dr Sanchez    No overnight events, no complaints. Feels well this am.    Tele: sinus rhythm w/ intact conduction, infrequent PVC  MDT Interrogation 8/1/23: battery, sensing, impedance are stable and WNL. VDD 50-115bpm, 11.8mV/720ohms/0.38V@0.24ms   CXR PA/LAT: pending     MEDICATIONS  (STANDING):  apixaban 5 milliGRAM(s) Oral two times a day  atorvastatin 80 milliGRAM(s) Oral at bedtime  brimonidine 0.2% Ophthalmic Solution 1 Drop(s) Both EYES two times a day  chlorhexidine 2% Cloths 1 Application(s) Topical <User Schedule>  clopidogrel Tablet 75 milliGRAM(s) Oral daily  furosemide    Tablet 40 milliGRAM(s) Oral daily  insulin lispro (ADMELOG) corrective regimen sliding scale   SubCutaneous at bedtime  insulin lispro (ADMELOG) corrective regimen sliding scale   SubCutaneous three times a day before meals  metoprolol succinate ER 25 milliGRAM(s) Oral daily  pantoprazole    Tablet 40 milliGRAM(s) Oral before breakfast  polyethylene glycol 3350 17 Gram(s) Oral daily  sacubitril 24 mG/valsartan 26 mG 1 Tablet(s) Oral two times a day    Vital Signs Last 24 Hrs  T(C): 37.1 (01 Aug 2023 08:00), Max: 37.5 (31 Jul 2023 11:00)  T(F): 98.8 (01 Aug 2023 08:00), Max: 99.5 (31 Jul 2023 11:00)  HR: 78 (01 Aug 2023 08:00) (30 - 80)  BP: 118/52 (31 Jul 2023 17:15) (118/52 - 155/40)  BP(mean): 79 (31 Jul 2023 13:00) (66 - 79)  RR: 14 (01 Aug 2023 08:00) (7 - 25)  SpO2: 95% (01 Aug 2023 08:00) (81% - 100%)    Parameters below as of 01 Aug 2023 08:00  Patient On (Oxygen Delivery Method): nasal cannula      Physical Exam:  Constitutional: NAD, AAOx3  Cardiovascular: +S1S2 RRR  Left parasternal loop site: +dermabond, +scant dried heme  Pulmonary: CTA b/l, unlabored  GI: soft NTND +BS  Extremities: +1 pedal edema b/l, +distal pulses b/l  Neuro: non focal, MILLS x4  b/l groin access sites: sutures removed, Left groin w/ slight ooze >dstat dsg placed, no gross bleeding, swelling, or hematoma     LABS:                        11.9   6.29  )-----------( 144      ( 01 Aug 2023 03:50 )             36.1     08-01    138  |  105  |  24.8<H>  ----------------------------<  100<H>  3.7   |  24.0  |  0.95    Ca    7.8<L>      01 Aug 2023 03:50  Phos  3.3     08-01  Mg     1.7     08-01    TPro  5.2<L>  /  Alb  2.9<L>  /  TBili  0.7  /  DBili  x   /  AST  18  /  ALT  16  /  AlkPhos  61  08-01    PT/INR - ( 30 Jul 2023 19:50 )   PT: 12.5 sec;   INR: 1.13 ratio    PTT - ( 30 Jul 2023 19:50 )  PTT:24.9 sec    Pro-Brain Natriuretic Peptide (08.01.23 @ 03:50)    Pro-Brain Natriuretic Peptide: 3707:     Pro-Brain Natriuretic Peptide (07.30.23 @ 13:00)    Pro-Brain Natriuretic Peptide: 6641 pg/mL    Cardiology Testing:   [facility] 12/2022: stable CAD, no intervention           A/P: 89 year old male patient with a known history of CAD s/p CABG (1995 [place]) and PCI (most recent PATRIC to RCA 7/2022), ICM (EF 30-35% 12/2022 & 35-40% this admission), HFrEF,  DM, HTN, carotid stenosis & HLD  admitted 7/30/23 with slow atrial flutter with rates in the 40s and intermittent CHB with narrow escape and frequent pauses up to 20 seconds in duration associated with symptoms of presyncope and syncope. Pt POD #1 s/p typical atrial flutter ablation (CTI line), EPS >HV 65msec,  MDT Micrjona AV leadless PPM implant and MDT ILR implant w/ Dr Sanchez.     1. typical atrial flutter s/p ablation & MDT ILR implant   - remains in sinus rhythm  - b/l groin sutures removed, groin care & restrictions reviewed with pt. dsg can be removed in am 8/2/23  - continue Eliquis 5mg po BID & plavix 75 mg po daily. Will likely plan to decrease to 2.5mg po bid in one month. Dr Sanchez discussed this at length with pt who is in favor of higher dose Eliquis for stroke prophylaxis   - continue protonix 40mg po daily  - resume Toprol 25mg po daily  - supplement to keep k> 4.0 mag >2.0  - MDT ILR implanted for arrhythmia surveillance.      2. AV Conduction disease  - EPS revealed HV 65msec and AVWBCL > 600msec  - s/p MDT Micra AV leadless PPM   - CXR PA/LAT pending  - ppm and ILR follow up reviewed with pt. Community Cardiologist: Dr Simmons. Pt will follow up with  Cardiology EP.    3. HFrEF  - EF 35-40% was 30-35%  - will resume GDMT > Toprol, Entresto, Lasix  - repeat EF 3 months now back in sinus rhythm, can consider primary prevention ICD at that time pending EF.      d/w Dr Sanchez further recommendations to follow

## 2023-08-01 NOTE — DISCHARGE NOTE NURSING/CASE MANAGEMENT/SOCIAL WORK - PATIENT PORTAL LINK FT
You can access the FollowMyHealth Patient Portal offered by [facility] by registering at the following website: [email or web address]. By joining [employer]’s FollowMyHealth portal, you will also be able to view your health information using other applications (apps) compatible with our system.

## 2023-08-01 NOTE — DISCHARGE NOTE PROVIDER - HOSPITAL COURSE
89M PMH CAD s/p CABG/PCI, carotid artery stenosis, HFrEF, HTN, HLD, DM who presented 7/30/23 from urgent care where he was found bradycardic to HR 30s-40s, found with high-grade AV block/CHB. He was monitored in the ICU. Micra AV leadless PPM was placed 7/31/23. Started on Eliquis 5mg 8/1/23.

## 2023-08-01 NOTE — PROGRESS NOTE ADULT - SUBJECTIVE AND OBJECTIVE BOX
[facility] PALLIATIVE MEDICINE     CC: FOLLOW UP VISIT + GOC    INTERVAL HPI/OVERNIGHT EVENTS:  Source if other than patient:  []Family   [x]Team     No acute events overnight.   S/P SHAUNA with ablation, PPM and ILR placement by EP yesterday  Pt in good spirits and feels well, offers no acute complaint.   Wife and daughter both present.     PRESENT SYMPTOMS:     Dyspnea: no  Nausea/Vomiting:  No  Anxiety:   No  Depression:  No  Fatigue:  No  Loss of appetite:  No  Constipation:  No    Pain: No            Character-            Duration-            Effect-            Factors-            Frequency-            Location-            Severity-    Pain AD Score:  [email or web address]/cog/painad.pdf (press ctrl + left click to view)    Review of Systems: Reviewed                     Negative: no chest pain or dyspnea at rest                     Positive: see above  All others negative     MEDICATIONS  (STANDING):  apixaban 5 milliGRAM(s) Oral two times a day  atorvastatin 80 milliGRAM(s) Oral at bedtime  brimonidine 0.2% Ophthalmic Solution 1 Drop(s) Both EYES two times a day  clopidogrel Tablet 75 milliGRAM(s) Oral daily  furosemide    Tablet 40 milliGRAM(s) Oral daily  insulin lispro (ADMELOG) corrective regimen sliding scale   SubCutaneous three times a day before meals  insulin lispro (ADMELOG) corrective regimen sliding scale   SubCutaneous at bedtime  metoprolol succinate ER 25 milliGRAM(s) Oral daily  pantoprazole    Tablet 40 milliGRAM(s) Oral before breakfast  polyethylene glycol 3350 17 Gram(s) Oral daily  sacubitril 24 mG/valsartan 26 mG 1 Tablet(s) Oral two times a day    MEDICATIONS  (PRN):      PHYSICAL EXAM:    Vital Signs Last 24 Hrs  T(C): 37.1 (01 Aug 2023 10:00), Max: 37.4 (31 Jul 2023 12:00)  T(F): 98.8 (01 Aug 2023 10:00), Max: 99.3 (31 Jul 2023 12:00)  HR: 78 (01 Aug 2023 10:00) (41 - 80)  BP: 118/52 (31 Jul 2023 17:15) (118/52 - 148/39)  BP(mean): 79 (31 Jul 2023 13:00) (70 - 79)  RR: 20 (01 Aug 2023 10:00) (7 - 25)  SpO2: 91% (01 Aug 2023 10:00) (90% - 99%)    Parameters below as of 01 Aug 2023 08:00  Patient On (Oxygen Delivery Method): nasal cannula    Karnofsky: 50 %    GEN: resting comfortably and no acute distress    HEENT: mucous membrane moist      Lungs: comfortable, nonlabored      CV: +s1/s2 regular rate and rhythm      GI: +BS, abdomen soft, nontender, nondistended      MSK: no cyanosis or edema     NEURO: nonfocal. awake and alert, interactive    Skin: warm and dry.       LABS:                          11.9   6.29  )-----------( 144      ( 01 Aug 2023 03:50 )             36.1     08-01    138  |  105  |  24.8<H>  ----------------------------<  100<H>  3.7   |  24.0  |  0.95    Ca    7.8<L>      01 Aug 2023 03:50  Phos  3.3     08-01  Mg     1.7     08-01    TPro  5.2<L>  /  Alb  2.9<L>  /  TBili  0.7  /  DBili  x   /  AST  18  /  ALT  16  /  AlkPhos  61  08-01    PT/INR - ( 30 Jul 2023 19:50 )   PT: 12.5 sec;   INR: 1.13 ratio         PTT - ( 30 Jul 2023 19:50 )  PTT:24.9 sec  Urinalysis Basic - ( 01 Aug 2023 03:50 )    Color: x / Appearance: x / SG: x / pH: x  Gluc: 100 mg/dL / Ketone: x  / Bili: x / Urobili: x   Blood: x / Protein: x / Nitrite: x   Leuk Esterase: x / RBC: x / WBC x   Sq Epi: x / Non Sq Epi: x / Bacteria: x      I&O's Summary    31 Jul 2023 07:01  -  01 Aug 2023 07:00  --------------------------------------------------------  IN: 0 mL / OUT: 750 mL / NET: -750 mL    RADIOLOGY & ADDITIONAL STUDIES:     CXR    ACC: [id number] EXAM:  XR CHEST PORTABLE URGENT 1V   ORDERED BY: TJ SMALLS     PROCEDURE DATE:  08/01/2023      INTERPRETATION:  EXAM: XR CHEST URGENT    INDICATION: Admitting Dxs: I44.39 CHEST PAIN post pacemaker XXR    COMPARISON: July 30 at 7:30 PM    IMPRESSION: Sternotomy as before. Loop recorders have appeared since   previous exam. Some haziness at the right base compatible with some   effusion and atelectatic change. Small left effusion also suggested.   ASCVD. No pneumothorax.    --- End of Report ---  IRINA VAZQUEZ MD; Attending Radiologist  This document has been electronically signed. Aug  1 2023 10:39AM    ADVANCE DIRECTIVES/TREATMENT PREFERENCES: FULL CODE    NEUROLOGICAL MEDICATIONS/OPIOIDS/BENZODIAZEPINE IN PAST 24 HOURS

## 2023-08-01 NOTE — DIETITIAN INITIAL EVALUATION ADULT - ORAL INTAKE PTA/DIET HISTORY
Pt reports eating fair appetite at home; eats small meals and does not eat as much as he used to. Pt reports eating a Heart Healthy diet. Pt endorses weight loss over past few years; however unable to quantify. Pt denies difficulty chewing or swallowing. Pt is currently NPO for PPM. NFPE conducted.

## 2023-08-01 NOTE — PROGRESS NOTE ADULT - TIME BILLING
D/W pt, wife, daughter, MICU Dr Clancy    Total time also includes discussion during interdisciplinary team rounds, chart review including but limited to prior admissions/ GOC discussions, review of established ACP documentations ( ex. living will, HCP, MOLST form),  review of medications/ labs/ imaging, examination, care coordination with other health care professionals, documentation EXCLUDING current goals of care or advance care planning discussions.
reviewing labs, notes, orders, radiographic studies, as well as counseling and coordinating care with the relevant multidisciplinary team, including with the primary and consulting providers.

## 2023-08-01 NOTE — PROGRESS NOTE ADULT - NUTRITIONAL ASSESSMENT
89M with CAD s/p CABG and PCI, DM, HTN, carotid stenosis, HFrEF, HLD admitted for to the MICU for symptomatic bradycardia with pauses. Planning for pacemaker today.    SSS iso Aflutter  JAMARI now improving    Neuro: Mentation intact. No need for sedation    Cardiac: Bradycardic and increasing pauses. Given pt did not tolerate dopamine, starting epinephrine infusion. Keeping pads in place. Planning on pacemaker today. No TVP in place. Pending official echo report.     Pulm: Maintaining 90s now on nasal canula    GI: NPO pending pace maker    : Cr peaked at 1.5 yesterday and down to 1.2 today. GFR also improving. Will maintain K>4, Ph>3, and Mg>2.    Endo: ISS. Will maintain between 110-180, TSH 2.48.    ID: SubQ heparin fot dvt prophylaxis. SCDs.
This patient has been assessed with a concern for Malnutrition and has been determined to have a diagnosis/diagnoses of Moderate protein-calorie malnutrition.    This patient is being managed with:   Diet Consistent Carbohydrate w/Evening Snack-  DASH/TLC {Sodium & Cholesterol Restricted} (DASH)  Entered: Jul 30 2023  5:12PM

## 2023-08-01 NOTE — DISCHARGE NOTE PROVIDER - PROVIDER TOKENS
PROVIDER:[TOKEN:[[id number]]],PROVIDER:[TOKEN:[[id number]]],PROVIDER:[TOKEN:[[id number]]]

## 2023-08-01 NOTE — DIETITIAN NUTRITION RISK NOTIFICATION - TREATMENT: THE FOLLOWING DIET HAS BEEN RECOMMENDED
I have reviewed discharge instructions with the patient. The patient verbalized understanding. Patient left ED via Discharge Method: ambulatory to Home with self. Opportunity for questions and clarification provided. Patient given 1 scripts. To continue your aftercare when you leave the hospital, you may receive an automated call from our care team to check in on how you are doing. This is a free service and part of our promise to provide the best care and service to meet your aftercare needs.  If you have questions, or wish to unsubscribe from this service please call [phone]. [facility].
Diet, Consistent Carbohydrate w/Evening Snack:   DASH/TLC {Sodium & Cholesterol Restricted} (DASH) (07-30-23 @ 17:12) [Active]

## 2023-08-01 NOTE — DIETITIAN INITIAL EVALUATION ADULT - NS FNS DIET ORDER
Diet, Consistent Carbohydrate w/Evening Snack:   DASH/TLC {Sodium & Cholesterol Restricted} (DASH) (07-30-23 @ 17:12)

## 2023-08-01 NOTE — DIETITIAN INITIAL EVALUATION ADULT - PERTINENT LABORATORY DATA
08-01    138  |  105  |  24.8<H>  ----------------------------<  100<H>  3.7   |  24.0  |  0.95    Ca    7.8<L>      01 Aug 2023 03:50  Phos  3.3     08-01  Mg     1.7     08-01    TPro  5.2<L>  /  Alb  2.9<L>  /  TBili  0.7  /  DBili  x   /  AST  18  /  ALT  16  /  AlkPhos  61  08-01  POCT Blood Glucose.: 94 mg/dL (08-01-23 @ 05:24)  A1C with Estimated Average Glucose Result: 6.8 % (07-31-23 @ 03:15)  A1C with Estimated Average Glucose Result: 7.2 % (12-13-22 @ 02:30)  A1C with Estimated Average Glucose Result: 6.5 % (08-31-22 @ 05:15)

## 2023-08-01 NOTE — DISCHARGE NOTE PROVIDER - CARE PROVIDER_API CALL
Kevyn Jay  Cardiology  [facility] [place] [address]  Phone: [phone]  Fax: [phone]  Follow Up Time:     Felipe Hartley  Cardiac Electrophysiology  [address] [place] [address], [place] [id number]  Phone: [phone]  Fax: [phone]  Follow Up Time:     Petty Sanchez  Cardiac Electrophysiology  [address] [place] [address], [place] [id number]  Phone: [phone]  Fax: [phone]  Follow Up Time:

## 2023-08-01 NOTE — DISCHARGE NOTE PROVIDER - NSDCMRMEDTOKEN_GEN_ALL_CORE_FT
aspirin 81 mg oral tablet: 1 tab(s) orally once a day  atorvastatin: 1 tab(s) orally once a day (in the evening)  brimonidine 0.2% ophthalmic solution: 1 drop(s) to each affected eye 2 times a day  Entresto 24 mg-26 mg oral tablet: 1 tab(s) orally 2 times a day   furosemide 40 mg oral tablet: 1 tab(s) orally once a day  metFORMIN 500 mg oral tablet: 1 tab(s) orally 2 times a day  Hold X 2 days post procedure. Restart on 12/15/22  metoprolol succinate 25 mg oral tablet, extended release: 1 tab(s) orally once a day  Plavix 75 mg oral tablet: 1 tab(s) orally once a day  Protonix 40 mg oral delayed release tablet: 1 tab(s) orally once a day  Timolol Maleate, long-acting 0.5% ophthalmic solution: 1 drop(s) to each affected eye once a day (in the morning)   apixaban 5 mg oral tablet: 1 tab(s) orally 2 times a day  aspirin 81 mg oral tablet: 1 tab(s) orally once a day  atorvastatin: 1 tab(s) orally once a day (in the evening)  brimonidine 0.2% ophthalmic solution: 1 drop(s) to each affected eye 2 times a day  Entresto 24 mg-26 mg oral tablet: 1 tab(s) orally 2 times a day   furosemide 40 mg oral tablet: 1 tab(s) orally once a day  metFORMIN 500 mg oral tablet: 1 tab(s) orally 2 times a day  Hold X 2 days post procedure. Restart on 12/15/22  metoprolol succinate 25 mg oral tablet, extended release: 1 tab(s) orally once a day  Plavix 75 mg oral tablet: 1 tab(s) orally once a day  polyethylene glycol 3350 oral powder for reconstitution: 17 gram(s) orally once a day  Protonix 40 mg oral delayed release tablet: 1 tab(s) orally once a day  Timolol Maleate, long-acting 0.5% ophthalmic solution: 1 drop(s) to each affected eye once a day (in the morning)   apixaban 5 mg oral tablet: 1 tab(s) orally 2 times a day  atorvastatin: 1 tab(s) orally once a day (in the evening)  brimonidine 0.2% ophthalmic solution: 1 drop(s) to each affected eye 2 times a day  Entresto 24 mg-26 mg oral tablet: 1 tab(s) orally 2 times a day   furosemide 40 mg oral tablet: 1 tab(s) orally once a day  metFORMIN 500 mg oral tablet: 1 tab(s) orally 2 times a day  Hold X 2 days post procedure. Restart on 12/15/22  metoprolol succinate 25 mg oral tablet, extended release: 1 tab(s) orally once a day  Plavix 75 mg oral tablet: 1 tab(s) orally once a day  polyethylene glycol 3350 oral powder for reconstitution: 17 gram(s) orally once a day  Protonix 40 mg oral delayed release tablet: 1 tab(s) orally once a day  Timolol Maleate, long-acting 0.5% ophthalmic solution: 1 drop(s) to each affected eye once a day (in the morning)

## 2023-08-01 NOTE — PROGRESS NOTE ADULT - SUBJECTIVE AND OBJECTIVE BOX
Patient is a 89y old  Male who presents with a chief complaint of High grade vs CHB (01 Aug 2023 10:08)      BRIEF HOSPITAL COURSE:   89M PMH CAD s/p CABG/PCI, carotid artery stenosis, HFrEF, HTN, HLD, DM who presented 7/30/23 from urgent care where he was found bradycardic to HR 30s-40s, found with high-grade AV block/CHB. He was monitored in the ICU. Micra AV leadless PPM was placed 7/31/23. Started on Eliquis 5mg 8/1/23.       PAST MEDICAL & SURGICAL HISTORY:  High cholesterol      Diabetes      MI, old      HTN (hypertension)      GERD (gastroesophageal reflux disease)      Arthritis      S/P CABG (coronary artery bypass graft)      History of coronary artery stent placement            Medications:    furosemide    Tablet 40 milliGRAM(s) Oral daily  metoprolol succinate ER 25 milliGRAM(s) Oral daily  sacubitril 24 mG/valsartan 26 mG 1 Tablet(s) Oral two times a day          apixaban 5 milliGRAM(s) Oral two times a day  clopidogrel Tablet 75 milliGRAM(s) Oral daily    pantoprazole    Tablet 40 milliGRAM(s) Oral before breakfast  polyethylene glycol 3350 17 Gram(s) Oral daily      atorvastatin 80 milliGRAM(s) Oral at bedtime  insulin lispro (ADMELOG) corrective regimen sliding scale   SubCutaneous at bedtime  insulin lispro (ADMELOG) corrective regimen sliding scale   SubCutaneous three times a day before meals        brimonidine 0.2% Ophthalmic Solution 1 Drop(s) Both EYES two times a day            ICU Vital Signs Last 24 Hrs  T(C): 37.1 (01 Aug 2023 10:00), Max: 37.5 (31 Jul 2023 11:00)  T(F): 98.8 (01 Aug 2023 10:00), Max: 99.5 (31 Jul 2023 11:00)  HR: 78 (01 Aug 2023 10:00) (41 - 80)  BP: 118/52 (31 Jul 2023 17:15) (118/52 - 155/40)  BP(mean): 79 (31 Jul 2023 13:00) (70 - 79)  ABP: 136/44 (01 Aug 2023 10:00) (125/41 - 158/56)  ABP(mean): 73 (01 Aug 2023 10:00) (62 - 91)  RR: 20 (01 Aug 2023 10:00) (7 - 25)  SpO2: 91% (01 Aug 2023 10:00) (81% - 99%)    O2 Parameters below as of 01 Aug 2023 08:00  Patient On (Oxygen Delivery Method): nasal cannula            ABG - ( 30 Jul 2023 20:36 )  pH, Arterial: 7.260 pH, Blood: x     /  pCO2: 50    /  pO2: 214   / HCO3: 22    / Base Excess: -4.7  /  SaO2: 100.0               I&O's Detail    31 Jul 2023 07:01  -  01 Aug 2023 07:00  --------------------------------------------------------  IN:  Total IN: 0 mL    OUT:    Indwelling Catheter - Urethral (mL): 750 mL  Total OUT: 750 mL    Total NET: -750 mL            LABS:                        11.9   6.29  )-----------( 144      ( 01 Aug 2023 03:50 )             36.1     08-01    138  |  105  |  24.8<H>  ----------------------------<  100<H>  3.7   |  24.0  |  0.95    Ca    7.8<L>      01 Aug 2023 03:50  Phos  3.3     08-01  Mg     1.7     08-01    TPro  5.2<L>  /  Alb  2.9<L>  /  TBili  0.7  /  DBili  x   /  AST  18  /  ALT  16  /  AlkPhos  61  08-01      CARDIAC MARKERS ( 01 Aug 2023 03:50 )  x     / 0.29 ng/mL / x     / x     / x      CARDIAC MARKERS ( 31 Jul 2023 06:15 )  x     / 0.20 ng/mL / 74 U/L / x     / x      CARDIAC MARKERS ( 30 Jul 2023 19:50 )  x     / 0.02 ng/mL / 99 U/L / x     / x      CARDIAC MARKERS ( 30 Jul 2023 13:00 )  x     / 0.02 ng/mL / x     / x     / x          CAPILLARY BLOOD GLUCOSE      POCT Blood Glucose.: 94 mg/dL (01 Aug 2023 08:37)    PT/INR - ( 30 Jul 2023 19:50 )   PT: 12.5 sec;   INR: 1.13 ratio         PTT - ( 30 Jul 2023 19:50 )  PTT:24.9 sec  Urinalysis Basic - ( 01 Aug 2023 03:50 )    Color: x / Appearance: x / SG: x / pH: x  Gluc: 100 mg/dL / Ketone: x  / Bili: x / Urobili: x   Blood: x / Protein: x / Nitrite: x   Leuk Esterase: x / RBC: x / WBC x   Sq Epi: x / Non Sq Epi: x / Bacteria: x      CULTURES:  Culture Results:   <10,000 CFU/mL Normal Urogenital Indigo (07-28 @ 09:45)      Physical Examination:  GENERAL: In NAD   HEENT: NC/AT  NECK: Supple, trachea midline  PULM: CTA anteriorly  CVS: +S1, S2  ABD: Soft, non-tender  EXTREMITIES: No pedal edema B/L  SKIN: No open wounds  NEURO: Grossly non-focal    DEVICES:     RADIOLOGY:   < from: Xray Chest 1 View- PORTABLE-Urgent (Xray Chest 1 View- PORTABLE-Urgent .) (08.01.23 @ 09:48) >    ACC: [id number] EXAM:  XR CHEST PORTABLE URGENT 1V   ORDERED BY: TJ SMALLS     PROCEDURE DATE:  08/01/2023          INTERPRETATION:  EXAM: XR CHEST URGENT    INDICATION: Admitting Dxs: I44.39 CHEST PAIN post pacemaker XXR    COMPARISON: July 30 at 7:30 PM    IMPRESSION: Sternotomy as before. Loop recorders have appeared since   previous exam. Some haziness at the right base compatible with some   effusion and atelectatic change. Small left effusion also suggested.   ASCVD. No pneumothorax.    --- End of Report ---            IRINA VAZQUEZ MD; Attending Radiologist  This document has been electronically signed. Aug  1 2023 10:39AM    < end of copied text >        < from: TTE Echo Complete w/o Contrast w/ Doppler (07.31.23 @ 08:25) >  Summary:   1. Left ventricular ejection fraction, by visual estimation, is 35 to   40%.   2. Moderately to severely decreased global left ventricular systolic   function.   3. Basal and mid inferior wall, basal and mid anterior wall, and basal   and mid inferolateral wall are abnormal as described above.   4. Severely enlarged left atrium.   5. Spectral Doppler shows pseudonormal pattern of left ventricular   myocardial filling (Grade II diastolic dysfunction).   6. Moderately enlarged right ventricle.   7. Moderately reduced RV systolic function.   8. Moderately enlarged right atrium.   9. There is no evidence of pericardial effusion.  10. Mild mitral valve regurgitation.  11. Moderate thickening of the anterior and posterior mitral valve   leaflets.  12. Moderate tricuspid regurgitation.  13. Mild aortic regurgitation.  14. Sclerotic aortic valve with normal opening.  15. RVOT Pulse wave doppler shows midsystolic notching with Accleration   time 85 msec, Calculated Mean PAP 41 mm Hg, Consistent with Pulmonary   HTN. Calculated PVR is 2 wood units.  16. Dilatation of the aortic root and ascending aorta.  17. Estimated pulmonary artery systolic pressure is 75.6 mmHg assuming a   right atrial pressure of 8 mmHg, which is consistent with severe   pulmonary hypertension.  18. Endocardial visualization was enhanced with intravenous echo contrast.    MD Rangel Electronically signed on 7/31/2023 at 1:21:21 PM            *** Final ***    < end of copied text >

## 2023-08-01 NOTE — DIETITIAN INITIAL EVALUATION ADULT - ETIOLOGY
Related to inability to meet sufficient protein energy needs in setting of advanced age, now admitted with CHB

## 2023-08-01 NOTE — DISCHARGE NOTE PROVIDER - NSDCFUADDINST_GEN_ALL_CORE_FT
[facility] will call you to schedule a 2 week post op follow up, please call [phone] if you don't hear from them or need to be seen sooner.

## 2023-08-01 NOTE — PROGRESS NOTE ADULT - ASSESSMENT
89 year old male with hx of CAD s/p CABG and PCI, DM, HTN, HLD, JR, HFrEF, valvular disease, severe pHTN, chronic Aflutter on AC, recent ED visit 7/28 acute CHF exacerbation and new onset Aflutter but signed out AMA before workup could be completed now presents from urgent care for bradycardia HF 30-40s, admitted to MICU for acute respiratory failure 2/2 acute decompensated HFrEF and high grade AVB. Palliative was consulted for support and to assist with ongoing goals of care.     Acute Decompensated HFrEF  High Grade AVB with Episodes of Asystole, Atrial Flutter  CAD S/P CABG and PCI  - elevated BNP (8181 compared to 2K in Dec 2022)  - TTE 12/2022 with EF 30-35%, moderate MR and AR, severe pulm HTN  - s/p SHAUNA with ablation, PPM and ILR placement by EP 7/31  - continue rate control and anticoagulation (apixaban 5mg BID) per EP and cardiology  - continue with PO lasix for diuresis   - EP following  - pt will need eventual outpatient follow up with EP and community cardiologist     Acute Respiratory Failure   - maintaining adequate sats on nasal cannula --> wean as tolerated    Acute Renal Failure  - serial Cr resolved, monitor serial renal indices    Palliative Care Encounter  - HCP/ surrogate is wife Radha  - Pt and family in better spirits today. He is making clinical improvements. Family is eager to get him back home. Pt plans to follow up with cardiology and EP on discharge for ongoing workup and eventual eval of ICD. Pt/family confirmed that prior to admission pt was mostly independent of ADLs. Emotional support provided and all questions answered.   - Palliative team will support and follow clinical progress to assist in any further goal of care needs

## 2023-08-01 NOTE — DISCHARGE NOTE PROVIDER - NSDCCPCAREPLAN_GEN_ALL_CORE_FT
PRINCIPAL DISCHARGE DIAGNOSIS  Diagnosis: High degree atrioventricular block  Assessment and Plan of Treatment:

## 2023-08-01 NOTE — PROGRESS NOTE ADULT - ASSESSMENT
89 year old patient of Dr Chino presents with high grade AVB. He was seen by me as ED consult July 28. He was found to have acute CHF, JAMARI, mild bradycardia in setting of new onset atrial flutter. He left AMA the same day. 7/29 evening returned with more significant bradycardia   - Atrial flutter with underlying AVB with periods of asystole - s/p flutter ablation, transvenous PPM and ILR insertion by Dr. Sanchez   - CHF, acute on chronic systolic - left AMA from ED  on 7/28: much improved now   - CAD: s/p cath (12/13/2022) patent LIMA -> LAD, patent SVG - OM1, native vessels: 100%mLAD, patent mid/distal RCA stents, EF - 30 - 35%,  Moderate to severe MR, Moderate AR. EF 35 to 40% this admission.    - Severe pulmonary HTN - palliative care following    Recommendations  Agree with current medical regimen, including Eliquis 5 mg PO BID, Entresto.   Follow up in 2 weeks.

## 2023-08-01 NOTE — PROGRESS NOTE ADULT - REASON FOR ADMISSION
High grade vs CHB

## 2023-08-02 RX ORDER — SACUBITRIL AND VALSARTAN 24; 26 MG/1; MG/1
1 TABLET, FILM COATED ORAL
Qty: 60 | Refills: 2
Start: 2023-08-02 | End: 2023-10-30

## 2023-08-14 ENCOUNTER — OFFICE (OUTPATIENT)
Dept: URBAN - METROPOLITAN AREA CLINIC 115 | Facility: CLINIC | Age: 88
Setting detail: OPHTHALMOLOGY
End: 2023-08-14
Payer: MEDICARE

## 2023-08-14 ENCOUNTER — NON-APPOINTMENT (OUTPATIENT)
Age: 88
End: 2023-08-14

## 2023-08-14 ENCOUNTER — APPOINTMENT (OUTPATIENT)
Dept: CARDIOLOGY | Facility: CLINIC | Age: 88
End: 2023-08-14
Payer: MEDICARE

## 2023-08-14 VITALS
HEIGHT: 62.5 IN | BODY MASS INDEX: 24.76 KG/M2 | DIASTOLIC BLOOD PRESSURE: 78 MMHG | SYSTOLIC BLOOD PRESSURE: 126 MMHG | WEIGHT: 138 LBS | OXYGEN SATURATION: 94 % | HEART RATE: 58 BPM

## 2023-08-14 DIAGNOSIS — H47.291: ICD-10-CM

## 2023-08-14 DIAGNOSIS — H01.001: ICD-10-CM

## 2023-08-14 DIAGNOSIS — H40.1113: ICD-10-CM

## 2023-08-14 DIAGNOSIS — K21.9 GASTRO-ESOPHAGEAL REFLUX DISEASE W/OUT ESOPHAGITIS: ICD-10-CM

## 2023-08-14 DIAGNOSIS — H40.1122: ICD-10-CM

## 2023-08-14 DIAGNOSIS — H01.004: ICD-10-CM

## 2023-08-14 DIAGNOSIS — Z96.1: ICD-10-CM

## 2023-08-14 PROCEDURE — 92133 CPTRZD OPH DX IMG PST SGM ON: CPT | Performed by: OPHTHALMOLOGY

## 2023-08-14 PROCEDURE — 93000 ELECTROCARDIOGRAM COMPLETE: CPT

## 2023-08-14 PROCEDURE — 99024 POSTOP FOLLOW-UP VISIT: CPT | Mod: 24

## 2023-08-14 PROCEDURE — 99215 OFFICE O/P EST HI 40 MIN: CPT | Mod: 24

## 2023-08-14 PROCEDURE — 92012 INTRM OPH EXAM EST PATIENT: CPT | Performed by: OPHTHALMOLOGY

## 2023-08-14 RX ORDER — LOSARTAN POTASSIUM 50 MG/1
50 TABLET, FILM COATED ORAL
Refills: 0 | Status: DISCONTINUED | COMMUNITY
End: 2023-08-14

## 2023-08-14 RX ORDER — BRIMONIDINE TARTRATE 2 MG/MG
0.2 SOLUTION/ DROPS OPHTHALMIC
Refills: 0 | Status: ACTIVE | COMMUNITY

## 2023-08-14 RX ORDER — ASPIRIN 81 MG
81 TABLET, DELAYED RELEASE (ENTERIC COATED) ORAL
Refills: 0 | Status: DISCONTINUED | COMMUNITY
End: 2023-08-14

## 2023-08-14 RX ORDER — PRAVASTATIN SODIUM 80 MG/1
80 TABLET ORAL
Refills: 0 | Status: DISCONTINUED | COMMUNITY
End: 2023-08-14

## 2023-08-14 ASSESSMENT — PACHYMETRY
OD_CT_UM: 550
OS_CT_UM: 540
OS_CT_CORRECTION: 0
OD_CT_CORRECTION: -1

## 2023-08-14 ASSESSMENT — REFRACTION_CURRENTRX
OD_SPHERE: +2.75
OD_OVR_VA: 20/
OD_OVR_VA: 20/
OD_VPRISM_DIRECTION: SV
OS_SPHERE: +2.75
OS_OVR_VA: 20/
OD_SPHERE: +2.75
OS_SPHERE: +2.75
OS_OVR_VA: 20/
OS_VPRISM_DIRECTION: SV

## 2023-08-14 ASSESSMENT — SPHEQUIV_DERIVED
OD_SPHEQUIV: -3.75
OS_SPHEQUIV: -1.25
OS_SPHEQUIV: -0.5

## 2023-08-14 ASSESSMENT — REFRACTION_AUTOREFRACTION
OS_SPHERE: 0.00
OD_CYLINDER: -1.50
OS_AXIS: 110
OD_SPHERE: -3.00
OD_AXIS: 146
OS_CYLINDER: -1.00

## 2023-08-14 ASSESSMENT — CONFRONTATIONAL VISUAL FIELD TEST (CVF)
OD_FINDINGS: FULL
OS_FINDINGS: FULL

## 2023-08-14 ASSESSMENT — TONOMETRY
OS_IOP_MMHG: 15
OD_IOP_MMHG: 15
OD_IOP_MMHG: 10
OS_IOP_MMHG: 15

## 2023-08-14 ASSESSMENT — REFRACTION_MANIFEST
OD_VA1: 20/FC
OS_AXIS: 180
OD_SPHERE: BAL
OS_VA1: 20/50-
OS_SPHERE: -1.00
OS_CYLINDER: -0.50

## 2023-08-14 ASSESSMENT — LID EXAM ASSESSMENTS
OS_BLEPHARITIS: LUL 1+
OD_BLEPHARITIS: RUL 1+

## 2023-08-14 ASSESSMENT — VISUAL ACUITY
OS_BCVA: 20/HM
OD_BCVA: 20/25

## 2023-08-14 NOTE — ADDENDUM
[FreeTextEntry1] : LE edema +1 need daily Lasix and add spironolactone for GDMT, pending EP follow up and repeat Echo in 3 months.

## 2023-08-14 NOTE — PHYSICAL EXAM
[Well Developed] : well developed [No Acute Distress] : no acute distress [No Carotid Bruit] : no carotid bruit [Normal S1, S2] : normal S1, S2 [No Murmur] : no murmur [Clear Lung Fields] : clear lung fields [No Respiratory Distress] : no respiratory distress  [Edema ___] : edema [unfilled] [Moves all extremities] : moves all extremities [Normal Speech] : normal speech [Alert and Oriented] : alert and oriented [Normal memory] : normal memory [de-identified] : using cane

## 2023-08-14 NOTE — HISTORY OF PRESENT ILLNESS
[FreeTextEntry1] : 89-year-old male patient with a known history of CAD s/p CABG (1995 [place]) and PCI (most recent PATRIC to RCA 7/2022), ICM (EF 30-35% 12/2022, now 35-40% on TTE 7/31/2023), HFrEF, DM, HTN, carotid stenosis & HLD, who presents for follow up after hospitalization. He initially presented to ED 7/28/2023, found to have acute CHF, JAMARI, mild bradycardia in setting of new onset atrial flutter- he left AMA the same day. He then returned to the hospital on 7/29/23 with more significant bradycardia. He was admitted 7/30/23 with slow atrial flutter with rates in the 40s and intermittent CHB with narrow escape and frequent pauses up to 20 seconds in duration associated with symptoms of presyncope and syncope. He then underwent typical atrial flutter ablation (CTI line), EPS >HV 65msec, MDT Micra AV leadless PPM implant and MDT ILR implant w/ Dr Sanchez on 7/31/2023. Previously followed by [place], now establishing care with Dr. Wallace.   Today, he is accompanied by his wife, Joleen, and daughter, Destiny. Reports feeling overall well. Reports BL LE edema, worse than usual baseline (prior to hospitalization) but now improving since increasing the frequency of Lasix last week. He was instructed to increase Lasix to daily as of last week, he took it Monday through Friday. Denies chest pain, SOB at rest, FINNEGAN, orthopnea, PND, palpitations, lightheadedness, dizziness, fatigue, syncope, near syncope.

## 2023-08-14 NOTE — REVIEW OF SYSTEMS
[Lower Ext Edema] : lower extremity edema [Feeling Fatigued] : not feeling fatigued [SOB] : no shortness of breath [Dyspnea on exertion] : not dyspnea during exertion [Chest Discomfort] : no chest discomfort [Palpitations] : no palpitations [Orthopnea] : no orthopnea [Syncope] : no syncope [Dizziness] : no dizziness

## 2023-08-14 NOTE — DISCUSSION/SUMMARY
[EKG obtained to assist in diagnosis and management of assessed problem(s)] : EKG obtained to assist in diagnosis and management of assessed problem(s) [FreeTextEntry1] : 89-year-old male patient with a known history of CAD s/p CABG (1995 [place]) and PCI (most recent PATRIC to RCA 7/2022), ICM (EF 30-35% 12/2022, now 35-40% on TTE 7/31/2023), HFrEF, DM, HTN, carotid stenosis & HLD, who presents for follow up after hospitalization. He initially presented to ED 7/28/2023, found to have acute CHF, JAMARI, mild bradycardia in setting of new onset atrial flutter- he left AMA the same day. He then returned to the hospital on 7/29/23 with more significant bradycardia. He was admitted 7/30/23 with slow atrial flutter with rates in the 40s and intermittent CHB with narrow escape and frequent pauses up to 20 seconds in duration associated with symptoms of presyncope and syncope. He then underwent typical atrial flutter ablation (CTI line), EPS >HV 65msec, MDT Micra AV leadless PPM implant and MDT ILR implant w/ Dr Sanchez on 7/31/2023. Previously followed by [place] [facility], now establishing care with Dr. Wallace.   Discussed with and patient seen by Dr. Wallace.   Assessment/ Plan:  1. New Atrial flutter with underlying AVB with periods of asystole - s/p flutter ablation, micra leadless PPM implant and ILR insertion by Dr. Sanchez on 7/31/2023:  - continue Metoprolol and Eliquis.  - EP follow up next week as planned.   2. ICM, HFrEF: - BL LE edema. Advised to take Lasix 20mg daily going forward. Start Spironolactone 25mg daily. Last labs 8/1 with creatinine 0.95, K3.7 and GFR 76. Repeat BMP in 1-2 weeks.  - Continue Entresto and Metoprolol.  - TTE to be done in November 2023 for reassessment of LV functioning.  - importance of low sodium diet reinforced to patient.  - advised to elevate LE as much as possible.    3. CAD: s/p cath (12/13/2022) patent LIMA -> LAD, patent SVG - OM1, native vessels: 100%mLAD, patent mid/distal RCA stents, EF - 30 - 35%, Moderate to severe MR, Moderate AR. EF 35 to 40% this admission. - Continue Lipitor and Plavix.    4. Follow up with Dr. Wallace in November after TTE, or sooner if needed.  Patient was advised to contact the office for any new, worsening or concerning symptoms. Patient verbalized understanding and is in agreement with the above plan.  Raine RIVER

## 2023-08-14 NOTE — CARDIOLOGY SUMMARY
[de-identified] : 8/14/2023: V paced, SR, lateral infarct age undetermined, QTc 486.  [de-identified] : TTE 7/31/2023: LVEF 35-40%, severe pulmonary HTN, Grade II Diastolic dysfunction, mild MR/ AR, dilated aortic root.

## 2023-08-17 ENCOUNTER — NON-APPOINTMENT (OUTPATIENT)
Age: 88
End: 2023-08-17

## 2023-08-22 ENCOUNTER — APPOINTMENT (OUTPATIENT)
Dept: ELECTROPHYSIOLOGY | Facility: CLINIC | Age: 88
End: 2023-08-22
Payer: MEDICARE

## 2023-08-22 VITALS
HEART RATE: 57 BPM | DIASTOLIC BLOOD PRESSURE: 53 MMHG | BODY MASS INDEX: 23.86 KG/M2 | OXYGEN SATURATION: 96 % | TEMPERATURE: 98 F | SYSTOLIC BLOOD PRESSURE: 103 MMHG | HEIGHT: 62.5 IN | WEIGHT: 133 LBS

## 2023-08-22 DIAGNOSIS — Z95.818 PRESENCE OF OTHER CARDIAC IMPLANTS AND GRAFTS: ICD-10-CM

## 2023-08-22 PROCEDURE — 99215 OFFICE O/P EST HI 40 MIN: CPT | Mod: 24

## 2023-08-22 PROCEDURE — 93000 ELECTROCARDIOGRAM COMPLETE: CPT | Mod: 59

## 2023-08-22 PROCEDURE — 93279 PRGRMG DEV EVAL PM/LDLS PM: CPT

## 2023-08-22 NOTE — PROCEDURE
[No] : not [Sinus Bradycardia] : sinus bradycardia [See Device Printout] : See device printout [None] : none

## 2023-08-22 NOTE — PHYSICAL EXAM
[General Appearance - Well Developed] : well developed [General Appearance - Well Nourished] : well nourished [Edema] : no peripheral edema present [] : no respiratory distress [Exaggerated Use Of Accessory Muscles For Inspiration] : no accessory muscle use

## 2023-08-23 LAB
ANION GAP SERPL CALC-SCNC: 10 MMOL/L
BUN SERPL-MCNC: 27 MG/DL
CALCIUM SERPL-MCNC: 9.7 MG/DL
CHLORIDE SERPL-SCNC: 103 MMOL/L
CO2 SERPL-SCNC: 27 MMOL/L
CREAT SERPL-MCNC: 1.13 MG/DL
EGFR: 61 ML/MIN/1.73M2
GLUCOSE SERPL-MCNC: 96 MG/DL
POTASSIUM SERPL-SCNC: 4.6 MMOL/L
SODIUM SERPL-SCNC: 139 MMOL/L

## 2023-08-25 ENCOUNTER — NON-APPOINTMENT (OUTPATIENT)
Age: 88
End: 2023-08-25

## 2023-08-26 NOTE — END OF VISIT
[Time Spent: ___ minutes] : I have spent [unfilled] minutes of time on the encounter. [FreeTextEntry3] : I have personally seen, examined, and participated in the care of this patient. I have reviewed all pertinent clinical information, including history, physical exam, plan, and the PA/NP's note and agree except as noted below.  This is a pleasant 89-year-old woman with CAD status post CABG complicated by ischemic cardiomyopathy and HFrEF (LVEF: 35-40%) and atrial flutter with slow ventricular response status post typical flutter ablation and [employer] Micra AV leadless pacemaker implant and ILR implant.  So far her ILR has not demonstrated any AF events, however we are aware that the sensitivity of AF detection may be low considering that she has slow ventricular response and will demonstrate regularization due to leadless pacemaker pacing.  Given this finding, we will have her remain on systemic anticoagulation with apixaban.  Today she has good AV synchrony with the leadless pacemaker.  In terms of volume status, she appears to be euvolemic today so we will not adjust any diuretics.  We can repeat an echocardiogram in 3 months to assess her ejection fraction and if her ejection fraction is less than 35%, we can discuss the role of primary prevention ICD if it is in line with her goals of care.  Felipe Hartley MD, FACC, [facility] Clinical Cardiac Electrophysiology

## 2023-08-26 NOTE — DISCUSSION/SUMMARY
[FreeTextEntry1] : 89 year old male patient with a known history of CAD s/p CABG (1995 [place]) and PCI (most recent PATRIC to RCA 7/2022), ICM (EF 30-35% 12/2022 & 35-40% this admission), HFrEF,  DM, HTN, carotid stenosis & HLD  admitted 7/30/23 with slow atrial flutter with rates in the 40s and intermittent CHB with narrow escape and frequent pauses up to 20 seconds in duration associated with symptoms of presyncope and syncope. Pt POD #1 s/p typical atrial flutter ablation (CTI line), EPS >HV 65msec,  MDT [place]da AV leadless PPM implant and MDT ILR implant w/ Dr Sanchez.   Post ablation was continued on Eliquis 5mg po BID & Plavix 75 mg po daily with plans to likely decrease to 2.5mg po bid in one month. Dr Sanchez discussed this at length with pt who is in favor of higher dose Eliquis for stroke prophylaxis. Continued on Pantoprazole and resumed Toprol 25mg po daily.  Also continued to on GDMT with plans to repeat EF 3 months now back in sinus rhythm, can consider primary prevention ICD at that time pending EF, and if ICD is in line with patients goals of care.  Following discharge, had reports BL LE edema, worse than usual baseline (prior to hospitalization).  We advised him to increased Lasix to everyday with improvement in symptoms. Seen last week by Dr. Wallace and advised to continue daily lasix and add aldactone 25mg.  He started Aldactone but has only been taking Lasix Monday, Wednesday and Friday. Despite this, LE swelling has continued to improve and now resolved on current regiment. Also reports 5 lb weight loss since last week.  Overall doing well and denies chest pain, SOB at rest, FINNEGAN, orthopnea, PND, palpitations, lightheadedness, dizziness, fatigue, syncope, near syncope. MDT ILR implanted for arrhythmia surveillance has shown NO AF, joy or tachyarrhythmia events thus far. Tolerating Eliquis 5mg BID without bleeding events. With recent diuresis, his weight is approaching 60kg but off that would warrant low dose Eliquis.   1. typical atrial flutter s/p ablation & MDT ILR implant  - remains in sinus rhythm - continue Eliquis 5mg po BID until next week and switch to low dose Eliquis 2.5mg BID starting Monday 8/28  (Current weight only slightly > 60kg since diuresis, advanced age, Cr 0.95). - NO AF noted thus far, however, ILR sensitivity for AF may be affected by rate regularity with bradycardia in AF.  Will be cautious about discontinuing a/c if tolerating. - Continue Toprol 25mg po daily    2. AV Conduction disease  - s/p MDT Micra AV leadless PPM. MICRA AV shows normal function total  ~10% with appropriate tracking when pacing occurs due to baseline bradycardia - EKG shows  , however, patient is conducting during interrogation of device with SB in the 50s. - PPM chekc in 3-4 months after TTE   3. HFrEF - EF 35-40% was 30-35% - will resume GDMT > Toprol, Entresto, Lasix - Appears euvolemic on current dose of Lasix M/W/Fr and Aldactone 25mg.  Planned for repeat blood work this week. - repeat EF 3 months now back in sinus rhythm, can consider primary prevention ICD at that time pending EF and if in line with patient goals of care  Seen and d/w Dr. Odilia Cervantes PAC

## 2023-08-26 NOTE — HISTORY OF PRESENT ILLNESS
[de-identified] : 89 year old male patient with a known history of CAD s/p CABG (1995 [place]) and PCI (most recent PATRIC to RCA 7/2022), ICM (EF 30-35% 12/2022 & 35-40% this admission), HFrEF,  DM, HTN, carotid stenosis & HLD  admitted 7/30/23 with slow atrial flutter with rates in the 40s and intermittent CHB with narrow escape and frequent pauses up to 20 seconds in duration associated with symptoms of presyncope and syncope. Pt POD #1 s/p typical atrial flutter ablation (CTI line), EPS >HV 65msec,  MDT Criselda AV leadless PPM implant and MDT ILR implant w/ Dr Sanchez.   Post ablation was continued on Eliquis 5mg po BID & Plavix 75 mg po daily with plans to likely decrease to 2.5mg po bid in one month. Dr Sanchez discussed this at length with pt who is in favor of higher dose Eliquis for stroke prophylaxis. Continued on Pantoprazole and resumed Toprol 25mg po daily.  Also continued to on GDMT with plans to repeat EF 3 months now back in sinus rhythm, can consider primary prevention ICD at that time pending EF, and if ICD is in line with patients goals of care.  Following discharge, had reports BL LE edema, worse than usual baseline (prior to hospitalization).  We advised him to increased Lasix to everyday with improvement in symptoms. Seen last week by Dr. Wallace and advised to continue daily lasix and add aldactone 25mg.  He started Aldactone but has only been taking Lasix Monday, Wednesday and Friday. Despite this, LE swelling has continued to improve and now resolved on current regiment. Also reports 5 lb weight loss since last week.  Overall doing well and denies chest pain, SOB at rest, FINNEGAN, orthopnea, PND, palpitations, lightheadedness, dizziness, fatigue, syncope, near syncope. MDT ILR implanted for arrhythmia surveillance has shown NO AF, joy or tachyarrhythmia events thus far. Tolerating Eliquis 5mg BID without bleeding events. With recent diuresis, his weight is approaching 60kg but off that would warrant low dose Eliquis.

## 2023-09-26 ENCOUNTER — APPOINTMENT (OUTPATIENT)
Dept: ELECTROPHYSIOLOGY | Facility: CLINIC | Age: 88
End: 2023-09-26
Payer: MEDICARE

## 2023-09-26 ENCOUNTER — NON-APPOINTMENT (OUTPATIENT)
Age: 88
End: 2023-09-26

## 2023-09-26 PROCEDURE — G2066: CPT

## 2023-09-26 PROCEDURE — 93298 REM INTERROG DEV EVAL SCRMS: CPT

## 2023-10-17 ENCOUNTER — APPOINTMENT (OUTPATIENT)
Dept: CARDIOLOGY | Facility: CLINIC | Age: 88
End: 2023-10-17
Payer: MEDICARE

## 2023-10-17 VITALS
HEART RATE: 55 BPM | WEIGHT: 135 LBS | HEIGHT: 62.5 IN | SYSTOLIC BLOOD PRESSURE: 96 MMHG | DIASTOLIC BLOOD PRESSURE: 42 MMHG | BODY MASS INDEX: 24.22 KG/M2 | OXYGEN SATURATION: 97 % | TEMPERATURE: 98.7 F

## 2023-10-17 PROCEDURE — 99213 OFFICE O/P EST LOW 20 MIN: CPT

## 2023-10-17 RX ORDER — SPIRONOLACTONE 25 MG/1
25 TABLET ORAL DAILY
Qty: 90 | Refills: 1 | Status: DISCONTINUED | COMMUNITY
Start: 2023-08-14 | End: 2023-10-17

## 2023-10-19 NOTE — ED PROVIDER NOTE - CROS ED [facility] ALL NEG
BP gradually rising to 200s. Patient was started on labetalol 100 P.O BID. On 10/14-10/15 overnight BP in 220s s/p labetalol 5mg IV, Metoprolol 5mg IV in am. BP remained elevated to 190s, Patient received Amlodipine 10mg and BP dropped to 96/92.   10/17 BP 180s/100s  Plan:   - Monitor BP and restart anti-HTN as needed negative...

## 2023-10-31 ENCOUNTER — NON-APPOINTMENT (OUTPATIENT)
Age: 88
End: 2023-10-31

## 2023-10-31 ENCOUNTER — APPOINTMENT (OUTPATIENT)
Dept: ELECTROPHYSIOLOGY | Facility: CLINIC | Age: 88
End: 2023-10-31
Payer: MEDICARE

## 2023-10-31 ENCOUNTER — INPATIENT (INPATIENT)
Facility: HOSPITAL | Age: 88
LOS: 0 days | Discharge: HOME | DRG: 287 | End: 2023-11-01
Attending: STUDENT IN AN ORGANIZED HEALTH CARE EDUCATION/TRAINING PROGRAM | Admitting: STUDENT IN AN ORGANIZED HEALTH CARE EDUCATION/TRAINING PROGRAM
Payer: COMMERCIAL

## 2023-10-31 VITALS
OXYGEN SATURATION: 96 % | SYSTOLIC BLOOD PRESSURE: 76 MMHG | TEMPERATURE: 98 F | HEART RATE: 76 BPM | DIASTOLIC BLOOD PRESSURE: 43 MMHG | RESPIRATION RATE: 20 BRPM

## 2023-10-31 DIAGNOSIS — R07.9 CHEST PAIN, UNSPECIFIED: ICD-10-CM

## 2023-10-31 DIAGNOSIS — I21.4 NON-ST ELEVATION (NSTEMI) MYOCARDIAL INFARCTION: ICD-10-CM

## 2023-10-31 DIAGNOSIS — Z95.5 PRESENCE OF CORONARY ANGIOPLASTY IMPLANT AND GRAFT: Chronic | ICD-10-CM

## 2023-10-31 DIAGNOSIS — Z95.1 PRESENCE OF AORTOCORONARY BYPASS GRAFT: Chronic | ICD-10-CM

## 2023-10-31 LAB
ALBUMIN SERPL ELPH-MCNC: 3.9 G/DL — SIGNIFICANT CHANGE UP (ref 3.3–5.2)
ALBUMIN SERPL ELPH-MCNC: 3.9 G/DL — SIGNIFICANT CHANGE UP (ref 3.3–5.2)
ALP SERPL-CCNC: 92 U/L — SIGNIFICANT CHANGE UP (ref 40–120)
ALP SERPL-CCNC: 92 U/L — SIGNIFICANT CHANGE UP (ref 40–120)
ALT FLD-CCNC: 25 U/L — SIGNIFICANT CHANGE UP
ALT FLD-CCNC: 25 U/L — SIGNIFICANT CHANGE UP
ANION GAP SERPL CALC-SCNC: 10 MMOL/L — SIGNIFICANT CHANGE UP (ref 5–17)
APTT BLD: 31.8 SEC — SIGNIFICANT CHANGE UP (ref 24.5–35.6)
APTT BLD: 31.8 SEC — SIGNIFICANT CHANGE UP (ref 24.5–35.6)
AST SERPL-CCNC: 39 U/L — SIGNIFICANT CHANGE UP
AST SERPL-CCNC: 39 U/L — SIGNIFICANT CHANGE UP
BASOPHILS # BLD AUTO: 0.03 K/UL — SIGNIFICANT CHANGE UP (ref 0–0.2)
BASOPHILS # BLD AUTO: 0.03 K/UL — SIGNIFICANT CHANGE UP (ref 0–0.2)
BASOPHILS NFR BLD AUTO: 0.4 % — SIGNIFICANT CHANGE UP (ref 0–2)
BASOPHILS NFR BLD AUTO: 0.4 % — SIGNIFICANT CHANGE UP (ref 0–2)
BILIRUB SERPL-MCNC: 0.4 MG/DL — SIGNIFICANT CHANGE UP (ref 0.4–2)
BILIRUB SERPL-MCNC: 0.4 MG/DL — SIGNIFICANT CHANGE UP (ref 0.4–2)
BLD GP AB SCN SERPL QL: SIGNIFICANT CHANGE UP
BLD GP AB SCN SERPL QL: SIGNIFICANT CHANGE UP
BUN SERPL-MCNC: 27 MG/DL — HIGH (ref 8–20)
BUN SERPL-MCNC: 27 MG/DL — HIGH (ref 8–20)
BUN SERPL-MCNC: 29.1 MG/DL — HIGH (ref 8–20)
BUN SERPL-MCNC: 29.1 MG/DL — HIGH (ref 8–20)
CALCIUM SERPL-MCNC: 9.2 MG/DL — SIGNIFICANT CHANGE UP (ref 8.4–10.5)
CHLORIDE SERPL-SCNC: 102 MMOL/L — SIGNIFICANT CHANGE UP (ref 96–108)
CHLORIDE SERPL-SCNC: 102 MMOL/L — SIGNIFICANT CHANGE UP (ref 96–108)
CHLORIDE SERPL-SCNC: 99 MMOL/L — SIGNIFICANT CHANGE UP (ref 96–108)
CHLORIDE SERPL-SCNC: 99 MMOL/L — SIGNIFICANT CHANGE UP (ref 96–108)
CO2 SERPL-SCNC: 23 MMOL/L — SIGNIFICANT CHANGE UP (ref 22–29)
CO2 SERPL-SCNC: 23 MMOL/L — SIGNIFICANT CHANGE UP (ref 22–29)
CO2 SERPL-SCNC: 26 MMOL/L — SIGNIFICANT CHANGE UP (ref 22–29)
CO2 SERPL-SCNC: 26 MMOL/L — SIGNIFICANT CHANGE UP (ref 22–29)
CREAT SERPL-MCNC: 1.15 MG/DL — SIGNIFICANT CHANGE UP (ref 0.5–1.3)
CREAT SERPL-MCNC: 1.15 MG/DL — SIGNIFICANT CHANGE UP (ref 0.5–1.3)
CREAT SERPL-MCNC: 1.18 MG/DL — SIGNIFICANT CHANGE UP (ref 0.5–1.3)
CREAT SERPL-MCNC: 1.18 MG/DL — SIGNIFICANT CHANGE UP (ref 0.5–1.3)
EGFR: 58 ML/MIN/1.73M2 — LOW
EGFR: 58 ML/MIN/1.73M2 — LOW
EGFR: 60 ML/MIN/1.73M2 — SIGNIFICANT CHANGE UP
EGFR: 60 ML/MIN/1.73M2 — SIGNIFICANT CHANGE UP
EOSINOPHIL # BLD AUTO: 0.14 K/UL — SIGNIFICANT CHANGE UP (ref 0–0.5)
EOSINOPHIL # BLD AUTO: 0.14 K/UL — SIGNIFICANT CHANGE UP (ref 0–0.5)
EOSINOPHIL NFR BLD AUTO: 1.7 % — SIGNIFICANT CHANGE UP (ref 0–6)
EOSINOPHIL NFR BLD AUTO: 1.7 % — SIGNIFICANT CHANGE UP (ref 0–6)
GLUCOSE SERPL-MCNC: 112 MG/DL — HIGH (ref 70–99)
GLUCOSE SERPL-MCNC: 112 MG/DL — HIGH (ref 70–99)
GLUCOSE SERPL-MCNC: 159 MG/DL — HIGH (ref 70–99)
GLUCOSE SERPL-MCNC: 159 MG/DL — HIGH (ref 70–99)
HCT VFR BLD CALC: 41.9 % — SIGNIFICANT CHANGE UP (ref 39–50)
HCT VFR BLD CALC: 41.9 % — SIGNIFICANT CHANGE UP (ref 39–50)
HGB BLD-MCNC: 13.5 G/DL — SIGNIFICANT CHANGE UP (ref 13–17)
HGB BLD-MCNC: 13.5 G/DL — SIGNIFICANT CHANGE UP (ref 13–17)
IMM GRANULOCYTES NFR BLD AUTO: 0.4 % — SIGNIFICANT CHANGE UP (ref 0–0.9)
IMM GRANULOCYTES NFR BLD AUTO: 0.4 % — SIGNIFICANT CHANGE UP (ref 0–0.9)
INR BLD: 1.3 RATIO — HIGH (ref 0.85–1.18)
INR BLD: 1.3 RATIO — HIGH (ref 0.85–1.18)
LYMPHOCYTES # BLD AUTO: 3.42 K/UL — HIGH (ref 1–3.3)
LYMPHOCYTES # BLD AUTO: 3.42 K/UL — HIGH (ref 1–3.3)
LYMPHOCYTES # BLD AUTO: 40.6 % — SIGNIFICANT CHANGE UP (ref 13–44)
LYMPHOCYTES # BLD AUTO: 40.6 % — SIGNIFICANT CHANGE UP (ref 13–44)
MAGNESIUM SERPL-MCNC: 1.9 MG/DL — SIGNIFICANT CHANGE UP (ref 1.6–2.6)
MAGNESIUM SERPL-MCNC: 1.9 MG/DL — SIGNIFICANT CHANGE UP (ref 1.6–2.6)
MCHC RBC-ENTMCNC: 30.6 PG — SIGNIFICANT CHANGE UP (ref 27–34)
MCHC RBC-ENTMCNC: 30.6 PG — SIGNIFICANT CHANGE UP (ref 27–34)
MCHC RBC-ENTMCNC: 32.2 GM/DL — SIGNIFICANT CHANGE UP (ref 32–36)
MCHC RBC-ENTMCNC: 32.2 GM/DL — SIGNIFICANT CHANGE UP (ref 32–36)
MCV RBC AUTO: 95 FL — SIGNIFICANT CHANGE UP (ref 80–100)
MCV RBC AUTO: 95 FL — SIGNIFICANT CHANGE UP (ref 80–100)
MONOCYTES # BLD AUTO: 0.53 K/UL — SIGNIFICANT CHANGE UP (ref 0–0.9)
MONOCYTES # BLD AUTO: 0.53 K/UL — SIGNIFICANT CHANGE UP (ref 0–0.9)
MONOCYTES NFR BLD AUTO: 6.3 % — SIGNIFICANT CHANGE UP (ref 2–14)
MONOCYTES NFR BLD AUTO: 6.3 % — SIGNIFICANT CHANGE UP (ref 2–14)
NEUTROPHILS # BLD AUTO: 4.27 K/UL — SIGNIFICANT CHANGE UP (ref 1.8–7.4)
NEUTROPHILS # BLD AUTO: 4.27 K/UL — SIGNIFICANT CHANGE UP (ref 1.8–7.4)
NEUTROPHILS NFR BLD AUTO: 50.6 % — SIGNIFICANT CHANGE UP (ref 43–77)
NEUTROPHILS NFR BLD AUTO: 50.6 % — SIGNIFICANT CHANGE UP (ref 43–77)
NT-PROBNP SERPL-SCNC: 2927 PG/ML — HIGH (ref 0–300)
NT-PROBNP SERPL-SCNC: 2927 PG/ML — HIGH (ref 0–300)
PLATELET # BLD AUTO: 216 K/UL — SIGNIFICANT CHANGE UP (ref 150–400)
PLATELET # BLD AUTO: 216 K/UL — SIGNIFICANT CHANGE UP (ref 150–400)
POTASSIUM SERPL-MCNC: 4.5 MMOL/L — SIGNIFICANT CHANGE UP (ref 3.5–5.3)
POTASSIUM SERPL-MCNC: 4.5 MMOL/L — SIGNIFICANT CHANGE UP (ref 3.5–5.3)
POTASSIUM SERPL-MCNC: 5.7 MMOL/L — HIGH (ref 3.5–5.3)
POTASSIUM SERPL-MCNC: 5.7 MMOL/L — HIGH (ref 3.5–5.3)
POTASSIUM SERPL-SCNC: 4.5 MMOL/L — SIGNIFICANT CHANGE UP (ref 3.5–5.3)
POTASSIUM SERPL-SCNC: 4.5 MMOL/L — SIGNIFICANT CHANGE UP (ref 3.5–5.3)
POTASSIUM SERPL-SCNC: 5.7 MMOL/L — HIGH (ref 3.5–5.3)
POTASSIUM SERPL-SCNC: 5.7 MMOL/L — HIGH (ref 3.5–5.3)
PROT SERPL-MCNC: 7.5 G/DL — SIGNIFICANT CHANGE UP (ref 6.6–8.7)
PROT SERPL-MCNC: 7.5 G/DL — SIGNIFICANT CHANGE UP (ref 6.6–8.7)
PROTHROM AB SERPL-ACNC: 14.3 SEC — HIGH (ref 9.5–13)
PROTHROM AB SERPL-ACNC: 14.3 SEC — HIGH (ref 9.5–13)
RBC # BLD: 4.41 M/UL — SIGNIFICANT CHANGE UP (ref 4.2–5.8)
RBC # BLD: 4.41 M/UL — SIGNIFICANT CHANGE UP (ref 4.2–5.8)
RBC # FLD: 15.3 % — HIGH (ref 10.3–14.5)
RBC # FLD: 15.3 % — HIGH (ref 10.3–14.5)
SODIUM SERPL-SCNC: 132 MMOL/L — LOW (ref 135–145)
SODIUM SERPL-SCNC: 132 MMOL/L — LOW (ref 135–145)
SODIUM SERPL-SCNC: 138 MMOL/L — SIGNIFICANT CHANGE UP (ref 135–145)
SODIUM SERPL-SCNC: 138 MMOL/L — SIGNIFICANT CHANGE UP (ref 135–145)
TROPONIN T SERPL-MCNC: 0.17 NG/ML — HIGH (ref 0–0.06)
TROPONIN T SERPL-MCNC: 0.17 NG/ML — HIGH (ref 0–0.06)
TROPONIN T SERPL-MCNC: 0.35 NG/ML — HIGH (ref 0–0.06)
TROPONIN T SERPL-MCNC: 0.35 NG/ML — HIGH (ref 0–0.06)
TROPONIN T SERPL-MCNC: <0.01 NG/ML — SIGNIFICANT CHANGE UP (ref 0–0.06)
TROPONIN T SERPL-MCNC: <0.01 NG/ML — SIGNIFICANT CHANGE UP (ref 0–0.06)
WBC # BLD: 8.42 K/UL — SIGNIFICANT CHANGE UP (ref 3.8–10.5)
WBC # BLD: 8.42 K/UL — SIGNIFICANT CHANGE UP (ref 3.8–10.5)
WBC # FLD AUTO: 8.42 K/UL — SIGNIFICANT CHANGE UP (ref 3.8–10.5)
WBC # FLD AUTO: 8.42 K/UL — SIGNIFICANT CHANGE UP (ref 3.8–10.5)

## 2023-10-31 PROCEDURE — 99497 ADVNCD CARE PLAN 30 MIN: CPT | Mod: 25

## 2023-10-31 PROCEDURE — 71045 X-RAY EXAM CHEST 1 VIEW: CPT | Mod: 26

## 2023-10-31 PROCEDURE — 93298 REM INTERROG DEV EVAL SCRMS: CPT | Mod: NC

## 2023-10-31 PROCEDURE — 99285 EMERGENCY DEPT VISIT HI MDM: CPT

## 2023-10-31 PROCEDURE — 99223 1ST HOSP IP/OBS HIGH 75: CPT

## 2023-10-31 PROCEDURE — 71275 CT ANGIOGRAPHY CHEST: CPT | Mod: 26,MA

## 2023-10-31 PROCEDURE — G2066: CPT | Mod: NC

## 2023-10-31 PROCEDURE — 74174 CTA ABD&PLVS W/CONTRAST: CPT | Mod: 26,MA

## 2023-10-31 PROCEDURE — 93010 ELECTROCARDIOGRAM REPORT: CPT

## 2023-10-31 RX ORDER — ACETAMINOPHEN 500 MG
650 TABLET ORAL EVERY 6 HOURS
Refills: 0 | Status: DISCONTINUED | OUTPATIENT
Start: 2023-10-31 | End: 2023-11-01

## 2023-10-31 RX ORDER — PANTOPRAZOLE SODIUM 20 MG/1
1 TABLET, DELAYED RELEASE ORAL
Qty: 0 | Refills: 0 | DISCHARGE

## 2023-10-31 RX ORDER — BRIMONIDINE TARTRATE 2 MG/MG
1 SOLUTION/ DROPS OPHTHALMIC
Refills: 0 | DISCHARGE

## 2023-10-31 RX ORDER — BRIMONIDINE TARTRATE 2 MG/MG
1 SOLUTION/ DROPS OPHTHALMIC
Refills: 0 | Status: DISCONTINUED | OUTPATIENT
Start: 2023-10-31 | End: 2023-11-01

## 2023-10-31 RX ORDER — CLOPIDOGREL BISULFATE 75 MG/1
75 TABLET, FILM COATED ORAL DAILY
Refills: 0 | Status: DISCONTINUED | OUTPATIENT
Start: 2023-10-31 | End: 2023-11-01

## 2023-10-31 RX ORDER — TIMOLOL 0.5 %
1 DROPS OPHTHALMIC (EYE) DAILY
Refills: 0 | Status: DISCONTINUED | OUTPATIENT
Start: 2023-10-31 | End: 2023-11-01

## 2023-10-31 RX ORDER — ATORVASTATIN CALCIUM 80 MG/1
40 TABLET, FILM COATED ORAL AT BEDTIME
Refills: 0 | Status: DISCONTINUED | OUTPATIENT
Start: 2023-10-31 | End: 2023-11-01

## 2023-10-31 RX ORDER — LANOLIN ALCOHOL/MO/W.PET/CERES
3 CREAM (GRAM) TOPICAL AT BEDTIME
Refills: 0 | Status: DISCONTINUED | OUTPATIENT
Start: 2023-10-31 | End: 2023-11-01

## 2023-10-31 RX ORDER — SACUBITRIL AND VALSARTAN 24; 26 MG/1; MG/1
1 TABLET, FILM COATED ORAL
Refills: 0 | Status: DISCONTINUED | OUTPATIENT
Start: 2023-10-31 | End: 2023-11-01

## 2023-10-31 RX ORDER — METOPROLOL TARTRATE 50 MG
25 TABLET ORAL DAILY
Refills: 0 | Status: DISCONTINUED | OUTPATIENT
Start: 2023-10-31 | End: 2023-11-01

## 2023-10-31 RX ORDER — HEPARIN SODIUM 5000 [USP'U]/ML
3800 INJECTION INTRAVENOUS; SUBCUTANEOUS ONCE
Refills: 0 | Status: COMPLETED | OUTPATIENT
Start: 2023-10-31 | End: 2023-10-31

## 2023-10-31 RX ORDER — APIXABAN 2.5 MG/1
1 TABLET, FILM COATED ORAL
Refills: 0 | DISCHARGE

## 2023-10-31 RX ORDER — ONDANSETRON 8 MG/1
4 TABLET, FILM COATED ORAL EVERY 8 HOURS
Refills: 0 | Status: DISCONTINUED | OUTPATIENT
Start: 2023-10-31 | End: 2023-11-01

## 2023-10-31 RX ORDER — SPIRONOLACTONE 25 MG/1
25 TABLET, FILM COATED ORAL DAILY
Refills: 0 | Status: DISCONTINUED | OUTPATIENT
Start: 2023-10-31 | End: 2023-11-01

## 2023-10-31 RX ORDER — HEPARIN SODIUM 5000 [USP'U]/ML
INJECTION INTRAVENOUS; SUBCUTANEOUS
Qty: 25000 | Refills: 0 | Status: DISCONTINUED | OUTPATIENT
Start: 2023-10-31 | End: 2023-11-01

## 2023-10-31 RX ORDER — HEPARIN SODIUM 5000 [USP'U]/ML
3800 INJECTION INTRAVENOUS; SUBCUTANEOUS EVERY 6 HOURS
Refills: 0 | Status: DISCONTINUED | OUTPATIENT
Start: 2023-10-31 | End: 2023-11-01

## 2023-10-31 RX ADMIN — HEPARIN SODIUM 3800 UNIT(S): 5000 INJECTION INTRAVENOUS; SUBCUTANEOUS at 21:37

## 2023-10-31 RX ADMIN — HEPARIN SODIUM 750 UNIT(S)/HR: 5000 INJECTION INTRAVENOUS; SUBCUTANEOUS at 21:37

## 2023-10-31 NOTE — H&P ADULT - TIME BILLING
Chart, labs and imaging reviewed. Physical examination, medication reconciliation and documentation. Discussion with patient and son at bed side.

## 2023-10-31 NOTE — ED ADULT TRIAGE NOTE - CHIEF COMPLAINT QUOTE
Patient presents to ER C/O chest pain, reports episode at 1415, patient was given ASA on route and now reports relief, HX of 5 stents, resp even/labored.

## 2023-10-31 NOTE — H&P ADULT - HISTORY OF PRESENT ILLNESS
Patient seen and examined before midnight.     89 y/o male with PMH of CAD s/p 5 stents and CABG, HTN, HLD, CHFrEF, high-grade AV block/CHB s/p Micra AV leadless PPM (7/31/23) came to the ED complaining of chest pain. Pain located in the mid sternal, described as pressure, radiating to the back, occurred while driving. He was able to pull over, bystander called EMS who gave Aspirin, noted pain resolved after taken it. He has no shortness of breath, diaphoresis, palpitation, nausea, vomiting, abdominal pain, change in bowel/urinary habit, fever, chills, cough, sick contact, recent travel.

## 2023-10-31 NOTE — CONSULT NOTE ADULT - ASSESSMENT
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   Chest pain midsternal, radiating to back, 10/10, non reproducible, accompanied with heartburn,  relieved with Asprin 325 and IV fluids from EMS.   Follows with Dr. Wallace.  Seen in the office on 10/17/2023; for medication adjustment due to hypotension and hyperkalemia.   He c/o feeling lightheaded and having low BPs since addition of spironolactone.   States leg edema has improved, No event on ILR .     Spironolactone was d.c.     Patient denies headache, dizziness, sob [id number], states he does not have any fever/chills, shortness of breath, nausea/vomiting, abdominal pain, leg pain, numbing and tingling.

## 2023-10-31 NOTE — CONSULT NOTE ADULT - NS ATTEND AMEND GEN_ALL_CORE FT
Patient seen and examined by me.  Patients daughter at bedside    T(C): 36.7 (10-31-23 @ 16:35), Max: 36.7 (10-31-23 @ 15:47)  HR: 69 (10-31-23 @ 16:35) (69 - 76)  BP: 149/67 (10-31-23 @ 16:35) (76/43 - 165/67)  RR: 22 (10-31-23 @ 16:35) (18 - 22)  SpO2: 98% (10-31-23 @ 16:35) (92% - 99%)  Patient alert and awake.  Chest- Bilateral Clear BS  Cardiac- S1 and S2  Abdomen- Soft    Assessment/Plan:  1. Chest pain  2. CAD and other problems    I have discussed my recommendation with the PA which are outlined above.  Will follow.

## 2023-10-31 NOTE — ED PROVIDER NOTE - PHYSICAL EXAMINATION
General: well appearing, NAD  Head: NC, AT  EENT: EOMI, no scleral icterus  Cardiac: Irregular rhythm, normal rate, no apparent murmurs, no lower extremity edema  Respiratory: CTABL, no respiratory distress   Abdomen: soft, ND, NT, nonperitonitic  MSK/Vascular: full ROM, soft compartments, warm extremities, 2+ peripheral pulses in all four extremities  Neuro: AAOx3, sensation to light touch intact  Psych: calm, cooperative

## 2023-10-31 NOTE — CONSULT NOTE ADULT - SUBJECTIVE AND OBJECTIVE BOX
[facility]                                              CARDIOLOGY AT [facility]                                                   [address], [place] [address]                                             Telephone: [phone]. Fax:[phone]                                                       CARDIOLOGY CONSULTATION NOTE                                                                                             History obtained by: Patient and medical record  Community Cardiologist:   Rodrigo   obtained: Yes [  ] No [  ]  Reason for Consultation:   Chest pain  Available out pt records reviewed: Yes [  ] No [  ]    Chief complaint:    Patient is a 89y old  Male who presents with a chief complaint of chest pain    HPI:  89yo, M:  PMH: CAD s/p CABG (1995 [place]) and PCI (most recent PATRIC to RCA 7/2022), ICM (EF 30-35% 12/2022, now 35-40% on TTE 7/31/2023), HFrEF, 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   Chest pain midsternal, non radiating to back, 10/10, non reproducible, relieved with Aspirn 325 and IV fluids from EMS.  Patient denies headache, dizziness, sob [id number], states he does not have any fever/chills, shortness of breath, nausea/vomiting, abdominal pain, leg pain    CARDIAC TESTING   ECHO:    STRESS:    CATH:     ELECTROPHYSIOLOGY:     PAST MEDICAL HISTORY  High cholesterol    Diabetes    MI, old    HTN (hypertension)    GERD (gastroesophageal reflux disease)    Arthritis        PAST SURGICAL HISTORY  S/P CABG (coronary artery bypass graft)    History of coronary artery stent placement        SOCIAL HISTORY:  Denies smoking/alcohol/drugs  CIGARETTES:     ALCOHOL:  DRUGS:    FAMILY HISTORY:  FH: hypertension      Family History of Cardiovascular Disease:  Yes [  ] No [  ]  Coronary Artery Disease in first degree relative: Yes [  ] No [  ]  Sudden Cardiac Death in First degree relative: Yes [  ] No [  ]    HOME MEDICATIONS:  apixaban 5 mg oral tablet: 1 tab(s) orally 2 times a day (01 Aug 2023 14:01)  atorvastatin 40 mg oral tablet: 1 tab(s) orally once a day (at bedtime) (01 Aug 2023 13:52)  furosemide 20 mg oral tablet: 1 tab(s) orally 3 times a week M,W,F (01 Aug 2023 13:57)  metFORMIN 500 mg oral tablet: 1 tab(s) orally 2 times a day  Hold X 2 days post procedure. Restart on 12/15/22 (01 Aug 2023 14:01)  polyethylene glycol 3350 oral powder for reconstitution: 17 gram(s) orally once a day (01 Aug 2023 14:01)  Protonix 40 mg oral delayed release tablet: 1 tab(s) orally once a day (01 Aug 2023 14:01)  timolol maleate 0.5% ophthalmic solution: 1 drop(s) in each eye once a day (01 Aug 2023 13:59)      CURRENT CARDIAC MEDICATIONS:      CURRENT OTHER MEDICATIONS:      ALLERGIES:   No Known Allergies      REVIEW OF SYMPTOMS:   CONSTITUTIONAL: No fever, no chills, no weight loss, no weight gain, no fatigue   ENMT:  No vertigo; No sinus or throat pain  NECK: No pain or stiffness  CARDIOVASCULAR: No chest pain, no dyspnea, no syncope/presyncope, no palpitations, no dizziness, no Orthopnea, no Paroxsymal nocturnal dyspnea  RESPIRATORY: no Shortness of breath, no cough, no wheezing  : No dysuria, no hematuria   GI: No dark color stool, no nausea, no diarrhea, no constipation, no abdominal pain   NEURO: No headache, no slurred speech   MUSCULOSKELETAL: No joint pain or swelling; No muscle, back, or extremity pain  PSYCH: No agitation, no anxiety.    ALL OTHER REVIEW OF SYSTEMS ARE NEGATIVE.    VITAL SIGNS:  T(C): 36.7 (10-31-23 @ 16:35), Max: 36.7 (10-31-23 @ 15:47)  T(F): 98 (10-31-23 @ 16:35), Max: 98 (10-31-23 @ 15:47)  HR: 69 (10-31-23 @ 16:35) (69 - 76)  BP: 149/67 (10-31-23 @ 16:35) (76/43 - 165/67)  RR: 22 (10-31-23 @ 16:35) (18 - 22)  SpO2: 98% (10-31-23 @ 16:35) (92% - 99%)    INTAKE AND OUTPUT:       PHYSICAL EXAM:  Constitutional: Comfortable . No acute distress.   HEENT: Atraumatic and normocephalic , neck is supple . no JVD. No carotid bruit.  CNS: A&Ox3. No focal deficits.   Respiratory: CTAB, unlabored   Cardiovascular: RRR normal s1 s2. No murmur. No rubs or gallop.  Gastrointestinal: Soft, non-tender. +Bowel sounds.   Extremities: 2+ Peripheral Pulses, No clubbing, cyanosis, or edema  Psychiatric: Calm . no agitation.   Skin: Warm and dry, no ulcers on extremities     LABS:  ( 31 Oct 2023 16:00 )  Troponin T  <0.01,  CPK  X    , CKMB  X    , BNP X                                  13.5   8.42  )-----------( 216      ( 31 Oct 2023 16:00 )             41.9     10-31    132<L>  |  99  |  27.0<H>  ----------------------------<  159<H>  5.7<H>   |  23.0  |  1.15    Ca    9.2      31 Oct 2023 16:00  Mg     1.9     10-31    TPro  7.5  /  Alb  3.9  /  TBili  0.4  /  DBili  x   /  AST  39  /  ALT  25  /  AlkPhos  92  10-31    PT/INR - ( 31 Oct 2023 16:00 )   PT: 14.3 sec;   INR: 1.30 ratio         PTT - ( 31 Oct 2023 16:00 )  PTT:31.8 sec  Urinalysis Basic - ( 31 Oct 2023 16:00 )    Color: x / Appearance: x / SG: x / pH: x  Gluc: 159 mg/dL / Ketone: x  / Bili: x / Urobili: x   Blood: x / Protein: x / Nitrite: x   Leuk Esterase: x / RBC: x / WBC x   Sq Epi: x / Non Sq Epi: x / Bacteria: x              INTERPRETATION OF TELEMETRY:     ECG:   Prior ECG: Yes [  ] No [  ]    RADIOLOGY & ADDITIONAL STUDIES:    X-ray:    CT scan:   MRI:   US:                                                [facility]                                              CARDIOLOGY AT [facility]                                                   [address], [place] [address]                                             Telephone: [phone]. Fax:[phone]                                                       CARDIOLOGY CONSULTATION NOTE                                                                                             History obtained by: Patient and medical record  Community Cardiologist:   Rodrigo   obtained: Yes [  ] No [  ]  Reason for Consultation:   Chest pain  Available out pt records reviewed: Yes [  ] No [  ]    Chief complaint:    Patient is a 89y old  Male who presents with a chief complaint of chest pain    HPI:  89yo, M:  PMH: CAD s[place]G (1995 [place]) and PCI (most recent PATRIC to RCA 7/2022), ICM (EF 30-35% 12/2022, now 35-40% on TTE 7/31/2023), HFrEF, 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   Chest pain midsternal, radiating to back, 10/10, non reproducible, accompanied with heartburn,  relieved with Asprin 325 and IV fluids from EMS.   Follows with Dr. Wallace.  Seen in the office on 10/17/2023; for medication adjustment due to hypotension and hyperkalemia.   He c/o feeling lightheaded and having low BPs since addition of spironolactone.   States leg edema has improved, No event on ILR .     Spironolactone was d.c.     Patient denies headache, dizziness, sob [id number], states he does not have any fever/chills, shortness of breath, nausea/vomiting, abdominal pain, leg pain, numbing and tingling.        CARDIAC TESTING    ECHO:  Echo: TTE 7/31/2023: LVEF 35-40%, severe pulmonary HTN, Grade II Diastolic dysfunction, mild MR/ AR, dilated aortic root.  STRESS:    CATH:   CAD: s/p cath (12/13/2022) patent LIMA -> LAD, patent SVG - OM1, native vessels: 100%mLAD, patent mid/distal RCA stents, EF - 30 - 35%, Moderate to severe MR, Moderate AR. EF 35 to 40% 7/2023.  Cath 8/2022:  Significant stenosis of the mid LAD distal to the LIMA anastomosis.   Successful stent to the mid LAD via LIMA.     ELECTROPHYSIOLOGY:   Leadless PPM 7/31/2023    PAST MEDICAL HISTORY  High cholesterol  Diabetes  MI, old  HTN (hypertension)  GERD (gastroesophageal reflux disease)  Arthritis    PAST SURGICAL HISTORY  S/P CABG (coronary artery bypass graft)  History of coronary artery stent placements    SOCIAL HISTORY:  Denies smoking/alcohol/drugs  CIGARETTES:     ALCOHOL:  DRUGS:    FAMILY HISTORY:  FH: hypertension  father with MI age 60    Family History of Cardiovascular Disease:  Yes [ x ] No [  ]  Coronary Artery Disease in first degree relative: Yes [  ] No [  ]  Sudden Cardiac Death in First degree relative: Yes [  ] No [  ]    HOME MEDICATIONS:  apixaban 5 mg oral tablet: 1 tab(s) orally 2 times a day   Entresto 24/26mg po bid  atorvastatin 40 mg oral tablet: 1 tab(s) orally once a day (at bedtime)  furosemide 20 mg oral tablet: 1 tab(s) orally 3 times a week M,W,F  metformin 500 mg oral tablet: 1 tab(s) orally 2 times a day  Metoprolol Succinate ER 25mg daily   Plavix 75mg po daily  Protinix 40mg po daily  Hold X 2 days post procedure. Restart on 12/15/22 (01 Aug 2023 14:01)  polyethylene glycol 3350 oral powder for reconstitution: 17 gram(s) orally once a day (01 Aug 2023 14:01)  Protonix 40 mg oral delayed release tablet: 1 tab(s) orally once a day (01 Aug 2023 14:01)  timolol maleate 0.5% ophthalmic solution: 1 drop(s) in each eye once a day (01 Aug 2023 13:59)  Brimonidine Tartrate 0.2 % Ophthalmic Solution; INSTILL 1 DROP IN BOTH EYES    CURRENT CARDIAC MEDICATIONS:  Continue - hold elquis for possible LHC    CURRENT OTHER MEDICATIONS:      ALLERGIES:   No Known Allergies      REVIEW OF SYMPTOMS:   CONSTITUTIONAL: No fever, no chills, no weight loss, no weight gain, no fatigue   ENMT:  No vertigo; No sinus or throat pain  NECK: No pain or stiffness  CARDIOVASCULAR: + chest pain, no dyspnea, no syncope/presyncope, no palpitations, no dizziness, no Orthopnea, no Paroxsymal nocturnal dyspnea  RESPIRATORY: no Shortness of breath, no cough, no wheezing  : No dysuria, no hematuria   GI: No dark color stool, no nausea, no diarrhea, no constipation, no abdominal pain   NEURO: No headache, no slurred speech   MUSCULOSKELETAL: No joint pain or swelling; No muscle, back, or extremity pain  PSYCH: No agitation, no anxiety.    ALL OTHER REVIEW OF SYSTEMS ARE NEGATIVE.    VITAL SIGNS:  T(C): 36.7 (10-31-23 @ 16:35), Max: 36.7 (10-31-23 @ 15:47)  T(F): 98 (10-31-23 @ 16:35), Max: 98 (10-31-23 @ 15:47)  HR: 69 (10-31-23 @ 16:35) (69 - 76)  BP: 149/67 (10-31-23 @ 16:35) (76/43 - 165/67)  RR: 22 (10-31-23 @ 16:35) (18 - 22)  SpO2: 98% (10-31-23 @ 16:35) (92% - 99%)    INTAKE AND OUTPUT:     PHYSICAL EXAM:  Constitutional: Comfortable . No acute distress.  Frail elderly male  HEENT: Atraumatic and normocephalic , neck is supple . no JVD. No carotid bruit.  CNS: A&Ox3. No focal deficits.   Respiratory: CTAB, unlabored - Diminished bilaterally  Cardiovascular: RRR normal s1 s2. No murmur. No rubs or gallop.  Gastrointestinal: Soft, non-tender. +Bowel sounds.   Extremities: + Peripheral Pulses, No clubbing, cyanosis, + edema  Psychiatric: Calm . no agitation.   Skin: Warm and dry, no ulcers on extremities     LABS:  ( 31 Oct 2023 16:00 )  Troponin T  <0.01,  CPK  X    , CKMB  X    , BNP X                            13.5   8.42  )-----------( 216      ( 31 Oct 2023 16:00 )             41.9     10-31    132<L>  |  99  |  27.0<H>  ----------------------------<  159<H>  5.7<H>   |  23.0  |  1.15    Ca    9.2      31 Oct 2023 16:00  Mg     1.9     10-31    TPro  7.5  /  Alb  3.9  /  TBili  0.4  /  DBili  x   /  AST  39  /  ALT  25  /  AlkPhos  92  10-31    PT/INR - ( 31 Oct 2023 16:00 )   PT: 14.3 sec;   INR: 1.30 ratio         PTT - ( 31 Oct 2023 16:00 )  PTT:31.8 sec  Urinalysis Basic - ( 31 Oct 2023 16:00 )    Color: x / Appearance: x / SG: x / pH: x  Gluc: 159 mg/dL / Ketone: x  / Bili: x / Urobili: x   Blood: x / Protein: x / Nitrite: x   Leuk Esterase: x / RBC: x / WBC x   Sq Epi: x / Non Sq Epi: x / Bacteria: x    INTERPRETATION OF TELEMETRY:   Sr no acute change    ECG: Patient had 3 EKGs in the Ed.  First EKG at 3:50 pm - TWI lateral leads, no p waves noted.    EKG at 4: 39, inferior ST depression noted, and lateral ST depressions also noted.    Prior ECG: Yes [x  ] No [  ]

## 2023-10-31 NOTE — CONSULT NOTE ADULT - PROBLEM SELECTOR RECOMMENDATION 9
Patient presents with chest pain, well known to [place] cardiology.  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  ECG: Patient had 3 EKGs in the Ed.  First EKG at 3:50 pm - TWI lateral leads, no p waves noted.   EKG at 4: 39, inferior ST depression noted, and lateral ST depressions also noted.   Echo: TTE 7/31/2023: LVEF 35-40%, severe pulmonary HTN, Grade II Diastolic dysfunction, mild MR/ AR, dilated aortic root.  CAD: s/p cath (12/13/2022) patent LIMA -> LAD, patent SVG - OM1, native vessels: 100%mLAD, patent mid/distal RCA stents, EF - 30 - 35%, Moderate to severe MR, Moderate AR. EF 35 to 40% 7/2023.  Cath 8/2022:  Significant stenosis of the mid LAD distal to the LIMA anastomosis.   Successful stent to the mid LAD.  Repeat Limited Echo tonight  NST in am  NPO  Hold Eliquis for possible LHC if abnormal stress test  Continue to trend trops.    Device interrogation in am. Patient presents with chest pain, well known to [place] cardiology.  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  ECG: Patient had 3 EKGs in the Ed.  First EKG at 3:50 pm - TWI lateral leads, no p waves noted.   EKG at 4: 39, inferior ST depression noted, and lateral ST depressions also noted.   Echo: TTE 7/31/2023: LVEF 35-40%, severe pulmonary HTN, Grade II Diastolic dysfunction, mild MR/ AR, dilated aortic root.  Cath 8/2022:  Significant stenosis of the mid LAD distal to the LIMA anastomosis - Successful stent to the mid LAD.  CAD: s/p cath (12/13/2022) patent LIMA -> LAD, patent SVG - OM1, native vessels: 100%mLAD, patent mid/distal RCA stents.   Repeat Limited Echo tonight  NST in am  NPO  Hold Eliquis for possible LHC if abnormal stress test  Continue to trend trops.    Device interrogation in am. 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  ECG: Patient had 3 EKGs in the Ed.  First EKG at 3:50 pm - TWI lateral leads, no p waves noted.   EKG at 4: 39, inferior ST depression noted, and lateral ST depressions also noted.   Echo: TTE 7/31/2023: LVEF 35-40%, severe pulmonary HTN, Grade II Diastolic dysfunction, mild MR/ AR, dilated aortic root.  Cath 8/2022:  Significant stenosis of the mid LAD distal to the LIMA anastomosis - Successful stent to the mid LAD.  CAD: s/p cath (12/13/2022) patent LIMA -> LAD, patent SVG - OM1, native vessels: 100%mLAD, patent mid/distal RCA stents.   Repeat Limited Echo tonight  NST in am  NPO  Hold Eliquis for possible LHC if abnormal stress test  Continue to trend trops.    Device interrogation in am.

## 2023-10-31 NOTE — H&P ADULT - NSHPPHYSICALEXAM_GEN_ALL_CORE
Vital Signs Last 24 Hrs  T(C): 36.7 (31 Oct 2023 23:31), Max: 36.8 (31 Oct 2023 19:30)  T(F): 98.1 (31 Oct 2023 23:31), Max: 98.2 (31 Oct 2023 19:30)  HR: 79 (31 Oct 2023 23:31) (69 - 79)  BP: 155/61 (31 Oct 2023 23:31) (76/43 - 165/67)  BP(mean): 67 (31 Oct 2023 16:35) (67 - 96)  RR: 18 (31 Oct 2023 23:31) (18 - 22)  SpO2: 93% (31 Oct 2023 23:31) (92% - 99%)    Parameters below as of 31 Oct 2023 23:31  Patient On (Oxygen Delivery Method): room air

## 2023-10-31 NOTE — PATIENT PROFILE ADULT - FALL HARM RISK - HARM RISK INTERVENTIONS

## 2023-10-31 NOTE — ED PROVIDER NOTE - OBJECTIVE STATEMENT
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  Patient states his pain resolved after being given aspirin by EMS on route to the hospital.  Patient states he does not have any fever/chills, shortness of breath, nausea/vomiting, abdominal pain, leg pain.

## 2023-10-31 NOTE — ED ADULT NURSE NOTE - OBJECTIVE STATEMENT
Received patient in critical care area with c/o of chest pain. Reports pmhx of CAD, CABG, hf, htn, hld, dm, AV block status post Micra AV leadless PM. Reports episode of midsternal chest pain radiating to back around 1330 which subsided when arrived in ed (Received aspirin by ems). Pt denies fever, chills, gu/gi symptoms, sob, n/v/c/d, abd pain.

## 2023-10-31 NOTE — H&P ADULT - ASSESSMENT
89 y/o male with PMH of CAD s/p 5 stents and CABG, HTN, HLD, CHFrEF, high-grade AV block/CHB s/p Micra AV leadless PPM (7/31/23) came to the ED complaining of chest pain. Pain located in the mid sternal, described as pressure, radiating to the back, occurred while driving. EMS gave Aspirin en route. In the ED, troponin done trending up, admitted for NSTEMI, cardiology following.     Med rec done as per Dr. First and last discharge summary. Patient and son at bed side said to call patient's daughter (Destiny @[phone]) who is in charge on medication, I called but no answer, please follow up with her in AM and adjust medications as needed.     NSTEMI   Admit to telemetry   ECG: TWI in lateral leads   Troponin: <0.01 --->0.17---->0.35  Started on Heparin drip, monitor aPTT as per protocol   Cardiology on board   Continue Plavix 75mg     CHFrEF   Entresto 24-26mg bid   Spironolactone 25mg   Lasix 20mg (M/W/F)     HTN/HLD/CAD  Metoprolol ER 25mg with holding parameters  Atorvastatin 40mg     Hx of aflutter   Eliquis 2.5mg bid (as per Dr. Conteh; as per discharge summary few weeks ago, it was 5mg bid)  Eliquis on hold, since patient is on Heparin drip   Rate controlled     DM-2   Hold Metformin 500mg bid   FS q6h while NPO   Will order Insulin sliding scale when diet is advanced    Supportive   DVT prophylaxis: on Heparin drip   Diet: NPO     Plan of care discussed with patient and son (Franklin) at bed side.

## 2023-10-31 NOTE — PATIENT PROFILE ADULT - NSPRESCRALCFREQ_GEN_A_NUR
PLEASE INCLUDE MORE SPECIFIC DOCUMENTATION IN YOUR PROGRESS NOTE AND DISCHARGE SUMMARY.  The documentation in this patient's medical record requires additional clarification to ensure that we accurately capture the patients diagnosis(es), treatment and/or severity of illness. Please document to the greatest level of specificity all corresponding diagnoses (either known or suspected) and/or treatment associated with the clinical information described below.
Never

## 2023-10-31 NOTE — ED PROVIDER NOTE - ATTENDING CONTRIBUTION TO CARE
89-year-old male; PMH significant for CAD (s/p CABG, PCI), carotid artery stenosis, CHF, HTN, HLD, DM, AV block (s/p Micra AV PPM); now presenting with chest pain–substernal chest pain, radiating to back, associated with palpitations and diaphoresis.  Denies shortness of breath.  Denies numbness or tingling.  Denies nausea or vomiting.  Denies fever, chills, sweats.  Denies any cough.  General:     NAD  Head:     NC/AT, EOMI, oral mucosa moist  Neck:     trachea midline  Lungs:     trace crackles @ bases  CVS:     S1S2, RRR, no m/g/r  Abd:     +BS, s/nt/nd, no organomegaly  Ext:    2+ radial and pedal pulses, no c/c/e  Neuro: AAOx3, no sensory/motor deficits  A/P:  89yoM p/w chest pain  -labs, stat cardiology consult, stat cta aorta

## 2023-10-31 NOTE — ED PROVIDER NOTE - CLINICAL SUMMARY MEDICAL DECISION MAKING FREE TEXT BOX
89 y/o male presenting after episode of CP 1.5 hours prior to presentation radiating to the back, resolved at the time of presentation after being given aspirin and fluids en route. EKG shows increase ST depressions V2-V5 compared to prior EKG. Concern for transient ischemic event. Initial Troponin negative. Aortic Dissection r/o with neg CTA Chest/Abd/Pelv. 2nd troponin 5.5 hours after onset of symptoms positive at 0.17. Cards to get TTE + Nuclear Stress Test, to discuss cath with family if NST abnormal. Admitted to Tele. Heparin started.

## 2023-11-01 ENCOUNTER — TRANSCRIPTION ENCOUNTER (OUTPATIENT)
Age: 88
End: 2023-11-01

## 2023-11-01 VITALS
TEMPERATURE: 98 F | SYSTOLIC BLOOD PRESSURE: 124 MMHG | RESPIRATION RATE: 18 BRPM | OXYGEN SATURATION: 97 % | HEART RATE: 64 BPM | DIASTOLIC BLOOD PRESSURE: 60 MMHG

## 2023-11-01 DIAGNOSIS — I21.4 NON-ST ELEVATION (NSTEMI) MYOCARDIAL INFARCTION: ICD-10-CM

## 2023-11-01 LAB
ANION GAP SERPL CALC-SCNC: 13 MMOL/L — SIGNIFICANT CHANGE UP (ref 5–17)
ANION GAP SERPL CALC-SCNC: 13 MMOL/L — SIGNIFICANT CHANGE UP (ref 5–17)
APTT BLD: 80.7 SEC — HIGH (ref 24.5–35.6)
APTT BLD: 80.7 SEC — HIGH (ref 24.5–35.6)
BUN SERPL-MCNC: 27.1 MG/DL — HIGH (ref 8–20)
BUN SERPL-MCNC: 27.1 MG/DL — HIGH (ref 8–20)
CALCIUM SERPL-MCNC: 8.8 MG/DL — SIGNIFICANT CHANGE UP (ref 8.4–10.5)
CALCIUM SERPL-MCNC: 8.8 MG/DL — SIGNIFICANT CHANGE UP (ref 8.4–10.5)
CHLORIDE SERPL-SCNC: 104 MMOL/L — SIGNIFICANT CHANGE UP (ref 96–108)
CHLORIDE SERPL-SCNC: 104 MMOL/L — SIGNIFICANT CHANGE UP (ref 96–108)
CO2 SERPL-SCNC: 23 MMOL/L — SIGNIFICANT CHANGE UP (ref 22–29)
CO2 SERPL-SCNC: 23 MMOL/L — SIGNIFICANT CHANGE UP (ref 22–29)
CREAT SERPL-MCNC: 0.88 MG/DL — SIGNIFICANT CHANGE UP (ref 0.5–1.3)
CREAT SERPL-MCNC: 0.88 MG/DL — SIGNIFICANT CHANGE UP (ref 0.5–1.3)
EGFR: 81 ML/MIN/1.73M2 — SIGNIFICANT CHANGE UP
EGFR: 81 ML/MIN/1.73M2 — SIGNIFICANT CHANGE UP
GLUCOSE BLDC GLUCOMTR-MCNC: 113 MG/DL — HIGH (ref 70–99)
GLUCOSE BLDC GLUCOMTR-MCNC: 113 MG/DL — HIGH (ref 70–99)
GLUCOSE SERPL-MCNC: 148 MG/DL — HIGH (ref 70–99)
GLUCOSE SERPL-MCNC: 148 MG/DL — HIGH (ref 70–99)
HCT VFR BLD CALC: 34.8 % — LOW (ref 39–50)
HCT VFR BLD CALC: 34.8 % — LOW (ref 39–50)
HGB BLD-MCNC: 11.5 G/DL — LOW (ref 13–17)
HGB BLD-MCNC: 11.5 G/DL — LOW (ref 13–17)
MCHC RBC-ENTMCNC: 31 PG — SIGNIFICANT CHANGE UP (ref 27–34)
MCHC RBC-ENTMCNC: 31 PG — SIGNIFICANT CHANGE UP (ref 27–34)
MCHC RBC-ENTMCNC: 33 GM/DL — SIGNIFICANT CHANGE UP (ref 32–36)
MCHC RBC-ENTMCNC: 33 GM/DL — SIGNIFICANT CHANGE UP (ref 32–36)
MCV RBC AUTO: 93.8 FL — SIGNIFICANT CHANGE UP (ref 80–100)
MCV RBC AUTO: 93.8 FL — SIGNIFICANT CHANGE UP (ref 80–100)
PLATELET # BLD AUTO: 172 K/UL — SIGNIFICANT CHANGE UP (ref 150–400)
PLATELET # BLD AUTO: 172 K/UL — SIGNIFICANT CHANGE UP (ref 150–400)
POTASSIUM SERPL-MCNC: 3.8 MMOL/L — SIGNIFICANT CHANGE UP (ref 3.5–5.3)
POTASSIUM SERPL-MCNC: 3.8 MMOL/L — SIGNIFICANT CHANGE UP (ref 3.5–5.3)
POTASSIUM SERPL-SCNC: 3.8 MMOL/L — SIGNIFICANT CHANGE UP (ref 3.5–5.3)
POTASSIUM SERPL-SCNC: 3.8 MMOL/L — SIGNIFICANT CHANGE UP (ref 3.5–5.3)
RBC # BLD: 3.71 M/UL — LOW (ref 4.2–5.8)
RBC # BLD: 3.71 M/UL — LOW (ref 4.2–5.8)
RBC # FLD: 15.3 % — HIGH (ref 10.3–14.5)
RBC # FLD: 15.3 % — HIGH (ref 10.3–14.5)
SODIUM SERPL-SCNC: 140 MMOL/L — SIGNIFICANT CHANGE UP (ref 135–145)
SODIUM SERPL-SCNC: 140 MMOL/L — SIGNIFICANT CHANGE UP (ref 135–145)
WBC # BLD: 6.62 K/UL — SIGNIFICANT CHANGE UP (ref 3.8–10.5)
WBC # BLD: 6.62 K/UL — SIGNIFICANT CHANGE UP (ref 3.8–10.5)
WBC # FLD AUTO: 6.62 K/UL — SIGNIFICANT CHANGE UP (ref 3.8–10.5)
WBC # FLD AUTO: 6.62 K/UL — SIGNIFICANT CHANGE UP (ref 3.8–10.5)

## 2023-11-01 PROCEDURE — 93279 PRGRMG DEV EVAL PM/LDLS PM: CPT | Mod: 26

## 2023-11-01 PROCEDURE — 99152 MOD SED SAME PHYS/QHP 5/>YRS: CPT

## 2023-11-01 PROCEDURE — 93457 R HRT ART/GRFT ANGIO: CPT | Mod: 26

## 2023-11-01 PROCEDURE — 99232 SBSQ HOSP IP/OBS MODERATE 35: CPT | Mod: 25

## 2023-11-01 PROCEDURE — 99239 HOSP IP/OBS DSCHRG MGMT >30: CPT

## 2023-11-01 RX ORDER — FUROSEMIDE 40 MG
1 TABLET ORAL
Refills: 0 | DISCHARGE

## 2023-11-01 RX ORDER — SPIRONOLACTONE 25 MG/1
1 TABLET, FILM COATED ORAL
Refills: 0 | DISCHARGE

## 2023-11-01 RX ORDER — RANOLAZINE 500 MG/1
1 TABLET, FILM COATED, EXTENDED RELEASE ORAL
Qty: 60 | Refills: 0
Start: 2023-11-01 | End: 2023-11-30

## 2023-11-01 RX ORDER — TIMOLOL 0.5 %
1 DROPS OPHTHALMIC (EYE)
Refills: 0 | DISCHARGE

## 2023-11-01 RX ORDER — METFORMIN HYDROCHLORIDE 850 MG/1
1 TABLET ORAL
Qty: 0 | Refills: 0 | DISCHARGE

## 2023-11-01 RX ORDER — PANTOPRAZOLE SODIUM 20 MG/1
1 TABLET, DELAYED RELEASE ORAL
Qty: 0 | Refills: 0 | DISCHARGE

## 2023-11-01 RX ORDER — RANOLAZINE 500 MG/1
500 TABLET, FILM COATED, EXTENDED RELEASE ORAL
Refills: 0 | Status: DISCONTINUED | OUTPATIENT
Start: 2023-11-01 | End: 2023-11-01

## 2023-11-01 RX ORDER — ASPIRIN/CALCIUM CARB/MAGNESIUM 324 MG
81 TABLET ORAL ONCE
Refills: 0 | Status: DISCONTINUED | OUTPATIENT
Start: 2023-11-01 | End: 2023-11-01

## 2023-11-01 RX ORDER — ATORVASTATIN CALCIUM 80 MG/1
1 TABLET, FILM COATED ORAL
Refills: 0 | DISCHARGE

## 2023-11-01 RX ADMIN — HEPARIN SODIUM 700 UNIT(S)/HR: 5000 INJECTION INTRAVENOUS; SUBCUTANEOUS at 04:10

## 2023-11-01 RX ADMIN — BRIMONIDINE TARTRATE 1 DROP(S): 2 SOLUTION/ DROPS OPHTHALMIC at 05:29

## 2023-11-01 RX ADMIN — CLOPIDOGREL BISULFATE 75 MILLIGRAM(S): 75 TABLET, FILM COATED ORAL at 11:00

## 2023-11-01 RX ADMIN — HEPARIN SODIUM 700 UNIT(S)/HR: 5000 INJECTION INTRAVENOUS; SUBCUTANEOUS at 07:32

## 2023-11-01 NOTE — PROGRESS NOTE ADULT - SUBJECTIVE AND OBJECTIVE BOX
Department of Cardiology                                                                  [facility]/[place] [address] [address] [facility]                                                            Telephone: [phone]. Fax:[phone]                                                                                      Cardiac Cath NP Note           Patient is a 89y old  Male who presents with a chief complaint of NSTEMI (2023 08:32)      Overnight events: rise in troponins, started on heparin gtt   Plan: [facility]     HPI:  Patient seen and examined before midnight.     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 Pain located in the mid sternal, described as pressure, radiating to the back, occurred while driving. He was able to pull over, bystander called EMS who gave Aspirin, noted pain resolved after taken it. He has no shortness of breath, diaphoresis, palpitation, nausea, vomiting, abdominal pain, change in bowel/urinary habit, fever, chills, cough, sick contact, recent travel.     (31 Oct 2023 23:44)      PAST MEDICAL & SURGICAL HISTORY:  High cholesterol  Diabetes  MI, old  HTN (hypertension)  GERD (gastroesophageal reflux disease)  Arthritis  S/P CABG (coronary artery bypass graft)  History of coronary artery stent placement      RADIOLOGY & ADDITIONAL STUDIES/DIAGNOSTIC TESTING:      ECHO  FINDINGS:  < from: TTE Echo Limited or F/U (10.31.23 @ 19:46) >  PHYSICIAN INTERPRETATION:  Left Ventricle: Endocardial visualization was enhanced with intravenous   echo contrast. The left ventricular internal cavity size is normal.  Global LV systolic function was moderately decreased. Left ventricular   ejection fraction, by visual estimation, is 35 to 40%. Spectral Doppler   shows pseudonormal pattern of left ventricular myocardial filling (Grade   II diastolic dysfunction).  Right Ventricle: The right ventricular size is normal. RV systolic   function is mildly reduced.  Left Atrium: Mildly enlarged left atrium.  Right Atrium: Moderately enlarged right atrium.  Pericardium: There is no evidence of pericardial effusion.  Mitral Valve: Thickening of the anterior and posterior mitral valve   leaflets. There is mild mitral annular calcification. Mild mitral valve   regurgitation is seen. The mean transvalvular gradient is 2.2 mmHg at 74   bpm.  Tricuspid Valve: Mild-moderate tricuspidregurgitation is visualized.   Estimated pulmonary artery systolic pressure is 75.8 mmHg assuming a   right atrial pressure of 3 mmHg, which is consistent with severe   pulmonary hypertension.  Aortic Valve: The aortic valve is trileaflet. No evidence of aortic   stenosis. Sclerotic aortic valve with normal opening. Moderate aortic   valve regurgitation is seen.  Pulmonic Valve: The pulmonic valve was not well visualized.  Aorta: There is dilatation of the ascending aorta and aortic root.  Venous: The inferior vena cava was normal sized, with respiratory size   variation greater than 50%.      Summary:   1. Left ventricular ejection fraction, by visual estimation, is 35 to   40%.   2. Moderately decreased global left ventricular systolic function.   3. Spectral Doppler shows pseudonormal pattern of left ventricular   myocardial filling (Grade II diastolic dysfunction).   4. There is mild left ventricular hypertrophy.   5. Mildly reduced RV systolic function.   6. Mildly enlarged left atrium.   7. Moderately enlarged right atrium.   8. Mild mitral annular calcification.   9. Mild mitral valve regurgitation.  10. Thickening of the anterior and posterior mitral valve leaflets.  11. Mild-moderate tricuspid regurgitation.  12. Moderate aortic regurgitation.  13. Sclerotic aortic valve with normal opening.  14. Dilatation of the ascending aorta and aortic root.  15. Estimated pulmonary artery systolic pressure is 75.8 mmHg assuming a   right atrial pressure of 3 mmHg, which is consistent with severe   pulmonary hypertension.  16. Endocardial visualization was enhanced with intravenous echo contrast.    [place], Electronically signed on 2023 at 7:31:11 AM        CATHETERIZATION  FINDINGS:  CAD: s/p cath (2022) patent LIMA -> LAD, patent SVG - OM1, native vessels: 100%mLAD, patent mid/distal RCA stents, EF - 30 - 35%, Moderate to severe MR, Moderate AR. EF 35 to 40% 2023.  Cath 2022:  Significant stenosis of the mid LAD distal to the LIMA anastomosis.   Successful stent to the mid LAD via LIMA.       Allergies    No Known Allergies    Intolerances        MEDICATIONS  (STANDING):  aspirin  chewable 81 milliGRAM(s) Oral once  atorvastatin 40 milliGRAM(s) Oral at bedtime  brimonidine 0.2% Ophthalmic Solution 1 Drop(s) Both EYES two times a day  clopidogrel Tablet 75 milliGRAM(s) Oral daily  heparin  Infusion.  Unit(s)/Hr (7.5 mL/Hr) IV Continuous <Continuous>  metoprolol succinate ER 25 milliGRAM(s) Oral daily  sacubitril 24 mG/valsartan 26 mG 1 Tablet(s) Oral two times a day  timolol 0.5% Solution 1 Drop(s) Both EYES daily    MEDICATIONS  (PRN):  acetaminophen     Tablet .. 650 milliGRAM(s) Oral every 6 hours PRN Temp greater or equal to 38C (100.4F), Mild Pain (1 - 3)  aluminum hydroxide/magnesium hydroxide/simethicone Suspension 30 milliLiter(s) Oral every 4 hours PRN Dyspepsia  heparin   Injectable 3800 Unit(s) IV Push every 6 hours PRN For aPTT less than 40  melatonin 3 milliGRAM(s) Oral at bedtime PRN Insomnia  ondansetron Injectable 4 milliGRAM(s) IV Push every 8 hours PRN Nausea and/or Vomiting      FAMILY HISTORY:  FH: hypertension        SOCIAL HISTORY: denies tobacco, ETOH, or drugs     REVIEW OF SYSTEMS:  General: No fevers/chills, or fatigue  HEENT: No visual disturbances, no hearing loss, no headaches, no epistaxis.  CV: No chest pain, no palpitations, no edema, no orthopnea, no PND, or FINNEGAN  Respiratory: No dyspnea, no wheeze, no cough.  GI: no nausea/vomiting, no black/bloody stools.  : No hematuria  Musculoskeletal: No myalgias, no arthralgias, no back pain.    Vital Signs Last 24 Hrs  T(C): 36.2 (2023 09:54), Max: 36.8 (31 Oct 2023 19:30)  T(F): 97.2 (2023 09:54), Max: 98.2 (31 Oct 2023 19:30)  HR: 70 (2023 09:54) (65 - 79)  BP: 115/76 (2023 09:54) (76/43 - 165/67)  BP(mean): 67 (31 Oct 2023 16:35) (67 - 96)  RR: 15 (2023 09:54) (15 - 22)  SpO2: 95% (2023 09:54) (92% - 99%)    Parameters below as of 2023 09:54  Patient On (Oxygen Delivery Method): room air        Daily     Daily     I&O's Detail    31 Oct 2023 07:01  -  2023 07:00  --------------------------------------------------------  IN:  Total IN: 0 mL    OUT:    Voided (mL): 150 mL  Total OUT: 150 mL    Total NET: -150 mL          PHYSICAL EXAM:   GENERAL: Pt lying comfortably, NAD.  ENMT: PERRL, +EOMI.  NECK: soft, Supple, No JVD,   CHEST/LUNG: Clear to auscultatation bilaterally; No wheezing.  HEART: S1S2+, Regular rate and rhythm; No murmurs.  ABDOMEN: Soft, Nontender, Nondistended; Bowel sounds present.  MUSCULOSKELETAL: Normal range of motion.  SKIN: No rashes or lesions.  NEURO: AAOX3, no focal deficits, no motor r sensory loss.  PSYCH: normal mood.  VASCULAR:   Radial +2 R/+2 L  Femoral +2 R/+2 L  PT +2 R/+2 L  DP +2 R/+2 L      INTERPRETATION OF TELEMETRY: SR     EC bpm anterolateral ST depression/TWI      LABS:                        11.5   6.62  )-----------( 172      ( 2023 03:36 )             34.8     11    140  |  104  |  27.1<H>  ----------------------------<  148<H>  3.8   |  23.0  |  0.88    Ca    8.8      2023 03:36  Mg     1.9     10-31    TPro  7.5  /  Alb  3.9  /  TBili  0.4  /  DBili  x   /  AST  39  /  ALT  25  /  AlkPhos  92  10-31    CARDIAC MARKERS ( 31 Oct 2023 22:13 )  x     / 0.35 ng/mL / x     / x     / x      CARDIAC MARKERS ( 31 Oct 2023 19:36 )  x     / 0.17 ng/mL / x     / x     / x      CARDIAC MARKERS ( 31 Oct 2023 16:00 )  x     / <0.01 ng/mL / x     / x     / x          PT/INR - ( 31 Oct 2023 16:00 )   PT: 14.3 sec;   INR: 1.30 ratio         PTT - ( 2023 03:36 )  PTT:80.7 sec  Urinalysis Basic - ( 2023 03:36 )    Color: x / Appearance: x / SG: x / pH: x  Gluc: 148 mg/dL / Ketone: x  / Bili: x / Urobili: x   Blood: x / Protein: x / Nitrite: x   Leuk Esterase: x / RBC: x / WBC x   Sq Epi: x / Non Sq Epi: x / Bacteria: x      I&O's Summary    31 Oct 2023 07:01  -  2023 07:00  --------------------------------------------------------  IN: 0 mL / OUT: 150 mL / NET: -150 mL

## 2023-11-01 NOTE — PROGRESS NOTE ADULT - ASSESSMENT
Risk Assessments:  ASA: 3  Mallampati:2  Creat:0.88  GFR: 81  BRA: 5.4%      Indication: NSTEMI      Plan:    -plan for [facility]   -preferred access: left radial   -confirmed appropriate NPO duration  -ECG and Labs reviewed  -Aspirin 81mg po pre-cath/Plavix 75mg po pre procedure   -NS 0.9% 250ml/hr x 1 bolus; pre procedure JOEY ppx   -procedure discussed with patient; risks and benefits explained, questions answered  -consent obtained by attending IC Risk Assessments:  ASA: 3  Mallampati:2  Creat:0.88  GFR: 81  BRA: 5.4%      Indication: NSTEMI      Plan:    -plan for [facility]   -preferred access: left radial   -confirmed appropriate NPO duration  -ECG and Labs reviewed  -Aspirin 81mg po pre-cath/Plavix 75mg po pre procedure   -NS 0.9% 250ml/hr x 1 bolus; pre procedure JOEY ppx   -procedure discussed with patient; risks and benefits explained, questions answered  -consent obtained by attending IC    POST CATH ADDENDUM   Now s/p [facility]  with Dr. Gonzalez, tolerated procedure well. Pt arrived to recovery in NAD and HDS,  access site stable, no bleed/hematoma, distal pulse +,   Intraprocedurally: pt rec'd IV sedation, heparin 2,000 units, and omnipaque 81ml   Findings: LRA; patent LIMA to LAD with patent stent, occluded SVG to OM, large RCA with patent stents     Plan:  -Formal cath report pending  -Post procedure management/monitoring per protocol  -Access site precautions  -Radial compression band removal at 1400, OOB 1430 cleared from cardiac standpoint for d/c home  -Repeat ECG if any clinical indication or change on tele  -elevated troponin possibly due to hypotension demand ischemia   -will add Ranexa 500mg BID   -heparin d/c'd, may resume eliquis tonight   -hold lasix until follow up with Dr. Wallace   -Continue current medical therapy  -Educated regarding post procedure management and care  -Discussed the importance of RF modification  -Cardiac rehab info provided/referral and communication to cardiac rehab completed  -F/U outpt early next week with Dr. Wallace office

## 2023-11-01 NOTE — PROGRESS NOTE ADULT - SUBJECTIVE AND OBJECTIVE BOX
Patient is a 89y old  Male who presents with a chief complaint of     INTERVAL HPI/OVERNIGHT EVENTS:    MEDICATIONS  (STANDING):  atorvastatin 40 milliGRAM(s) Oral at bedtime  brimonidine 0.2% Ophthalmic Solution 1 Drop(s) Both EYES two times a day  clopidogrel Tablet 75 milliGRAM(s) Oral daily  heparin  Infusion.  Unit(s)/Hr (7.5 mL/Hr) IV Continuous <Continuous>  metoprolol succinate ER 25 milliGRAM(s) Oral daily  sacubitril 24 mG/valsartan 26 mG 1 Tablet(s) Oral two times a day  spironolactone 25 milliGRAM(s) Oral daily  timolol 0.5% Solution 1 Drop(s) Both EYES daily    MEDICATIONS  (PRN):  acetaminophen     Tablet .. 650 milliGRAM(s) Oral every 6 hours PRN Temp greater or equal to 38C (100.4F), Mild Pain (1 - 3)  aluminum hydroxide/magnesium hydroxide/simethicone Suspension 30 milliLiter(s) Oral every 4 hours PRN Dyspepsia  heparin   Injectable 3800 Unit(s) IV Push every 6 hours PRN For aPTT less than 40  melatonin 3 milliGRAM(s) Oral at bedtime PRN Insomnia  ondansetron Injectable 4 milliGRAM(s) IV Push every 8 hours PRN Nausea and/or Vomiting      Allergies    No Known Allergies    Intolerances        REVIEW OF SYSTEMS:  CONSTITUTIONAL: No fever, weight loss, or fatigue  RESPIRATORY: No cough, wheezing, chills or hemoptysis; No shortness of breath  CARDIOVASCULAR: No chest pain, palpitations, dizziness, or leg swelling  GASTROINTESTINAL: No abdominal or epigastric pain. No nausea, vomiting, or hematemesis; No diarrhea or constipation. No melena or hematochezia.  NEUROLOGICAL: No headaches, memory loss, loss of strength, numbness, or tremors  MUSCULOSKELETAL: No joint pain or swelling; No muscle, back, or extremity pain      Vital Signs Last 24 Hrs  T(C): 36.6 (01 Nov 2023 04:35), Max: 36.8 (31 Oct 2023 19:30)  T(F): 97.8 (01 Nov 2023 04:35), Max: 98.2 (31 Oct 2023 19:30)  HR: 78 (01 Nov 2023 04:35) (69 - 79)  BP: 134/63 (01 Nov 2023 04:35) (76/43 - 165/67)  BP(mean): 67 (31 Oct 2023 16:35) (67 - 96)  RR: 18 (01 Nov 2023 04:35) (18 - 22)  SpO2: 95% (01 Nov 2023 04:35) (92% - 99%)    Parameters below as of 01 Nov 2023 04:35  Patient On (Oxygen Delivery Method): room air        PHYSICAL EXAM:  GENERAL: NAD,   HEAD:  Atraumatic, Normocephalic  EYES: EOMI, PERRLA, conjunctiva and sclera clear  NECK: Supple, No JVD, Normal thyroid  NERVOUS SYSTEM:  Alert & Oriented X3, No gross focal deficits  CHEST/LUNG: Clear to percussion bilaterally; No rales, rhonchi, wheezing, or rubs  HEART: Regular rate and rhythm; No murmurs, rubs, or gallops  ABDOMEN: Soft, Nontender, Nondistended; Bowel sounds present  EXTREMITIES:  No clubbing, cyanosis, or edema  SKIN: No rashes or lesions    LABS:                        11.5   6.62  )-----------( 172      ( 01 Nov 2023 03:36 )             34.8     11-01    140  |  104  |  27.1<H>  ----------------------------<  148<H>  3.8   |  23.0  |  0.88    Ca    8.8      01 Nov 2023 03:36  Mg     1.9     10-31    TPro  7.5  /  Alb  3.9  /  TBili  0.4  /  DBili  x   /  AST  39  /  ALT  25  /  AlkPhos  92  10-31    PT/INR - ( 31 Oct 2023 16:00 )   PT: 14.3 sec;   INR: 1.30 ratio         PTT - ( 01 Nov 2023 03:36 )  PTT:80.7 sec  Urinalysis Basic - ( 01 Nov 2023 03:36 )    Color: x / Appearance: x / SG: x / pH: x  Gluc: 148 mg/dL / Ketone: x  / Bili: x / Urobili: x   Blood: x / Protein: x / Nitrite: x   Leuk Esterase: x / RBC: x / WBC x   Sq Epi: x / Non Sq Epi: x / Bacteria: x      CAPILLARY BLOOD GLUCOSE          RADIOLOGY & ADDITIONAL TESTS:    Imaging Personally Reviewed:  [ ] YES  [ ] NO    Consultant(s) Notes Reviewed:  [ ] YES  [ ] NO    Care Discussed with Consultants/Other Providers [ ] YES  [ ] NO    Plan of Care discussed with Housestaff [ ]YES [ ] NO Patient is a 89 yoM w/ PMH significant for CAD s/p 5 stents and CABG, HTN, HLD, CHFrEF, 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 Presented to ED c/o Chest Pain described as mid-sternal pressure, radiation to the back, occurred while driving. EMS gave Aspirin, patient noted pain resolved. (1 Nov 2023 8:44)    INTERVAL HPI/OVERNIGHT EVENTS:    MEDICATIONS  (STANDING):  atorvastatin 40 milliGRAM(s) Oral at bedtime  brimonidine 0.2% Ophthalmic Solution 1 Drop(s) Both EYES two times a day  clopidogrel Tablet 75 milliGRAM(s) Oral daily  heparin  Infusion.  Unit(s)/Hr (7.5 mL/Hr) IV Continuous <Continuous>  metoprolol succinate ER 25 milliGRAM(s) Oral daily  sacubitril 24 mG/valsartan 26 mG 1 Tablet(s) Oral two times a day  spironolactone 25 milliGRAM(s) Oral daily  timolol 0.5% Solution 1 Drop(s) Both EYES daily    MEDICATIONS  (PRN):  acetaminophen     Tablet .. 650 milliGRAM(s) Oral every 6 hours PRN Temp greater or equal to 38C (100.4F), Mild Pain (1 - 3)  aluminum hydroxide/magnesium hydroxide/simethicone Suspension 30 milliLiter(s) Oral every 4 hours PRN Dyspepsia  heparin   Injectable 3800 Unit(s) IV Push every 6 hours PRN For aPTT less than 40  melatonin 3 milliGRAM(s) Oral at bedtime PRN Insomnia  ondansetron Injectable 4 milliGRAM(s) IV Push every 8 hours PRN Nausea and/or Vomiting      Allergies    No Known Allergies    Intolerances        REVIEW OF SYSTEMS:  CONSTITUTIONAL: No fever, weight loss, or fatigue  RESPIRATORY: No cough, wheezing, chills or hemoptysis; No shortness of breath  CARDIOVASCULAR: No chest pain, palpitations, dizziness, or leg swelling  GASTROINTESTINAL: Some Abdominal Discomfort, No abdominal or epigastric pain. No nausea, vomiting, or hematemesis; No diarrhea or constipation. No melena or hematochezia.  NEUROLOGICAL: No headaches, memory loss, loss of strength, numbness, or tremors  MUSCULOSKELETAL: No joint pain or swelling; No muscle, back, or extremity pain      Vital Signs Last 24 Hrs  T(C): 36.6 (01 Nov 2023 04:35), Max: 36.8 (31 Oct 2023 19:30)  T(F): 97.8 (01 Nov 2023 04:35), Max: 98.2 (31 Oct 2023 19:30)  HR: 78 (01 Nov 2023 04:35) (69 - 79)  BP: 134/63 (01 Nov 2023 04:35) (76/43 - 165/67)  BP(mean): 67 (31 Oct 2023 16:35) (67 - 96)  RR: 18 (01 Nov 2023 04:35) (18 - 22)  SpO2: 95% (01 Nov 2023 04:35) (92% - 99%)    Parameters below as of 01 Nov 2023 04:35  Patient On (Oxygen Delivery Method): room air        PHYSICAL EXAM:  GENERAL: NAD,   HEAD:  Atraumatic, Normocephalic  EYES: EOMI, PERRLA, conjunctiva and sclera clear  NECK: Supple, No JVD, Normal thyroid  NERVOUS SYSTEM:  Alert & Oriented X3, No gross focal deficits  CHEST/LUNG: CTAB; No rales, rhonchi, wheezing, or rubs  HEART: Regular rate and rhythm; No murmurs, rubs, or gallops  ABDOMEN: Soft, Nontender, Nondistended; Bowel sounds present  EXTREMITIES:  No clubbing, cyanosis, or edema  SKIN: No rashes or lesions    LABS:                        11.5   6.62  )-----------( 172      ( 01 Nov 2023 03:36 )             34.8     11-01    140  |  104  |  27.1<H>  ----------------------------<  148<H>  3.8   |  23.0  |  0.88    Ca    8.8      01 Nov 2023 03:36  Mg     1.9     10-31    TPro  7.5  /  Alb  3.9  /  TBili  0.4  /  DBili  x   /  AST  39  /  ALT  25  /  AlkPhos  92  10-31    PT/INR - ( 31 Oct 2023 16:00 )   PT: 14.3 sec;   INR: 1.30 ratio         PTT - ( 01 Nov 2023 03:36 )  PTT:80.7 sec  Urinalysis Basic - ( 01 Nov 2023 03:36 )    Color: x / Appearance: x / SG: x / pH: x  Gluc: 148 mg/dL / Ketone: x  / Bili: x / Urobili: x   Blood: x / Protein: x / Nitrite: x   Leuk Esterase: x / RBC: x / WBC x   Sq Epi: x / Non Sq Epi: x / Bacteria: x      CAPILLARY BLOOD GLUCOSE          RADIOLOGY & ADDITIONAL TESTS:    ACC: [id number] EXAM:  XR CHEST PORTABLE URGENT 1V   ORDERED BY: QUINTIN VARGAS   	  	PROCEDURE DATE:  10/31/2023    	  	  	  	INTERPRETATION:  AP chest on October 31, 2023 at 4:18 PM. Patient has   	chest pain.  	  	Heart magnified by technique.  	  	Sternotomy, mitral pacemaker, and left loop recorder again noted.  	  	There are persistent small infiltrates off the right hilum slightly   	increased from August 1.  	  	IMPRESSION: Slightly increased infiltrates off the right hilum. Other   	findings stable.  	  	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 Presented to ED c/o Chest Pain described as mid-sternal pressure, radiation to the back, occurred while driving. EMS gave Aspirin, patient noted pain resolved. (1 Nov 2023 8:44)    INTERVAL HPI/OVERNIGHT EVENTS:  No acute events reported overnight    TODAY:  Seen and examined at bedside. Currently denies chest pain,     MEDICATIONS  (STANDING):  atorvastatin 40 milliGRAM(s) Oral at bedtime  brimonidine 0.2% Ophthalmic Solution 1 Drop(s) Both EYES two times a day  clopidogrel Tablet 75 milliGRAM(s) Oral daily  heparin  Infusion.  Unit(s)/Hr (7.5 mL/Hr) IV Continuous <Continuous>  metoprolol succinate ER 25 milliGRAM(s) Oral daily  sacubitril 24 mG/valsartan 26 mG 1 Tablet(s) Oral two times a day  spironolactone 25 milliGRAM(s) Oral daily  timolol 0.5% Solution 1 Drop(s) Both EYES daily    MEDICATIONS  (PRN):  acetaminophen     Tablet .. 650 milliGRAM(s) Oral every 6 hours PRN Temp greater or equal to 38C (100.4F), Mild Pain (1 - 3)  aluminum hydroxide/magnesium hydroxide/simethicone Suspension 30 milliLiter(s) Oral every 4 hours PRN Dyspepsia  heparin   Injectable 3800 Unit(s) IV Push every 6 hours PRN For aPTT less than 40  melatonin 3 milliGRAM(s) Oral at bedtime PRN Insomnia  ondansetron Injectable 4 milliGRAM(s) IV Push every 8 hours PRN Nausea and/or Vomiting      Allergies    No Known Allergies    Intolerances        REVIEW OF SYSTEMS:  CONSTITUTIONAL: No fever, weight loss, or fatigue  RESPIRATORY: No cough, wheezing, chills or hemoptysis; No shortness of breath  CARDIOVASCULAR: No chest pain, palpitations, dizziness, or leg swelling  GASTROINTESTINAL: Some Abdominal Discomfort, No abdominal or epigastric pain. No nausea, vomiting, or hematemesis; No diarrhea or constipation. No melena or hematochezia.  NEUROLOGICAL: No headaches, memory loss, loss of strength, numbness, or tremors  MUSCULOSKELETAL: No joint pain or swelling; No muscle, back, or extremity pain      Vital Signs Last 24 Hrs  T(C): 36.6 (01 Nov 2023 04:35), Max: 36.8 (31 Oct 2023 19:30)  T(F): 97.8 (01 Nov 2023 04:35), Max: 98.2 (31 Oct 2023 19:30)  HR: 78 (01 Nov 2023 04:35) (69 - 79)  BP: 134/63 (01 Nov 2023 04:35) (76/43 - 165/67)  BP(mean): 67 (31 Oct 2023 16:35) (67 - 96)  RR: 18 (01 Nov 2023 04:35) (18 - 22)  SpO2: 95% (01 Nov 2023 04:35) (92% - 99%)    Parameters below as of 01 Nov 2023 04:35  Patient On (Oxygen Delivery Method): room air        PHYSICAL EXAM:  GENERAL: NAD,   HEAD:  Atraumatic, Normocephalic  EYES: EOMI, PERRLA, conjunctiva and sclera clear  NECK: Supple, No JVD, Normal thyroid  NERVOUS SYSTEM:  Alert & Oriented X3, No gross focal deficits  CHEST/LUNG: CTAB; No rales, rhonchi, wheezing, or rubs  HEART: Regular rate and rhythm; No murmurs, rubs, or gallops  ABDOMEN: Soft, Nontender, Nondistended; Bowel sounds present  EXTREMITIES:  No clubbing, cyanosis, or edema  SKIN: No rashes or lesions    LABS:                        11.5   6.62  )-----------( 172      ( 01 Nov 2023 03:36 )             34.8     11-01    140  |  104  |  27.1<H>  ----------------------------<  148<H>  3.8   |  23.0  |  0.88    Ca    8.8      01 Nov 2023 03:36  Mg     1.9     10-31    TPro  7.5  /  Alb  3.9  /  TBili  0.4  /  DBili  x   /  AST  39  /  ALT  25  /  AlkPhos  92  10-31    PT/INR - ( 31 Oct 2023 16:00 )   PT: 14.3 sec;   INR: 1.30 ratio         PTT - ( 01 Nov 2023 03:36 )  PTT:80.7 sec  Urinalysis Basic - ( 01 Nov 2023 03:36 )    Color: x / Appearance: x / SG: x / pH: x  Gluc: 148 mg/dL / Ketone: x  / Bili: x / Urobili: x   Blood: x / Protein: x / Nitrite: x   Leuk Esterase: x / RBC: x / WBC x   Sq Epi: x / Non Sq Epi: x / Bacteria: x      CAPILLARY BLOOD GLUCOSE          RADIOLOGY & ADDITIONAL TESTS:    ACC: [id number] EXAM:  XR CHEST PORTABLE URGENT 1V   ORDERED BY: QUINTIN VARGAS   	  	PROCEDURE DATE:  10/31/2023    	  	  	  	INTERPRETATION:  AP chest on October 31, 2023 at 4:18 PM. Patient has   	chest pain.  	  	Heart magnified by technique.  	  	Sternotomy, mitral pacemaker, and left loop recorder again noted.  	  	There are persistent small infiltrates off the right hilum slightly   	increased from August 1.  	  	IMPRESSION: Slightly increased infiltrates off the right hilum. Other   	findings stable.  	  	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 Presented to ED c/o Chest Pain described as mid-sternal pressure, radiation to the back, occurred while driving. EMS gave Aspirin, patient noted pain resolved. (1 Nov 2023 8:44)    INTERVAL HPI/OVERNIGHT EVENTS:  No acute events reported overnight    TODAY:  Seen and examined at bedside. Currently denies chest pain or SOB, denies BM since prior to admission. Patient was taken for PPM Interrogation and Catheterization.    MEDICATIONS  (STANDING):  atorvastatin 40 milliGRAM(s) Oral at bedtime  brimonidine 0.2% Ophthalmic Solution 1 Drop(s) Both EYES two times a day  clopidogrel Tablet 75 milliGRAM(s) Oral daily  heparin  Infusion.  Unit(s)/Hr (7.5 mL/Hr) IV Continuous <Continuous>  metoprolol succinate ER 25 milliGRAM(s) Oral daily  sacubitril 24 mG/valsartan 26 mG 1 Tablet(s) Oral two times a day  spironolactone 25 milliGRAM(s) Oral daily  timolol 0.5% Solution 1 Drop(s) Both EYES daily    MEDICATIONS  (PRN):  acetaminophen     Tablet .. 650 milliGRAM(s) Oral every 6 hours PRN Temp greater or equal to 38C (100.4F), Mild Pain (1 - 3)  aluminum hydroxide/magnesium hydroxide/simethicone Suspension 30 milliLiter(s) Oral every 4 hours PRN Dyspepsia  heparin   Injectable 3800 Unit(s) IV Push every 6 hours PRN For aPTT less than 40  melatonin 3 milliGRAM(s) Oral at bedtime PRN Insomnia  ondansetron Injectable 4 milliGRAM(s) IV Push every 8 hours PRN Nausea and/or Vomiting      Allergies    No Known Allergies    Intolerances        REVIEW OF SYSTEMS:  CONSTITUTIONAL: No fever, weight loss, or fatigue  RESPIRATORY: No cough, wheezing, chills or hemoptysis; No shortness of breath  CARDIOVASCULAR: No chest pain, palpitations, dizziness, or leg swelling  GASTROINTESTINAL: Some Abdominal Discomfort, No abdominal or epigastric pain. No nausea, vomiting, or hematemesis; No diarrhea or constipation. No melena or hematochezia.  NEUROLOGICAL: No headaches, memory loss, loss of strength, numbness, or tremors  MUSCULOSKELETAL: No joint pain or swelling; No muscle, back, or extremity pain      Vital Signs Last 24 Hrs  T(C): 36.6 (01 Nov 2023 04:35), Max: 36.8 (31 Oct 2023 19:30)  T(F): 97.8 (01 Nov 2023 04:35), Max: 98.2 (31 Oct 2023 19:30)  HR: 78 (01 Nov 2023 04:35) (69 - 79)  BP: 134/63 (01 Nov 2023 04:35) (76/43 - 165/67)  BP(mean): 67 (31 Oct 2023 16:35) (67 - 96)  RR: 18 (01 Nov 2023 04:35) (18 - 22)  SpO2: 95% (01 Nov 2023 04:35) (92% - 99%)    Parameters below as of 01 Nov 2023 04:35  Patient On (Oxygen Delivery Method): room air        PHYSICAL EXAM:  GENERAL: NAD,   HEAD:  Atraumatic, Normocephalic  EYES: EOMI, PERRLA, conjunctiva and sclera clear  NECK: Supple, No JVD, Normal thyroid  NERVOUS SYSTEM:  Alert & Oriented X3, No gross focal deficits  CHEST/LUNG: CTAB; No rales, rhonchi, wheezing, or rubs  HEART: Regular rate and rhythm; No murmurs, rubs, or gallops  ABDOMEN: Soft, Nontender, Nondistended; Bowel sounds present  EXTREMITIES:  No clubbing, cyanosis, or edema  SKIN: No rashes or lesions    LABS:                        11.5   6.62  )-----------( 172      ( 01 Nov 2023 03:36 )             34.8     11-01    140  |  104  |  27.1<H>  ----------------------------<  148<H>  3.8   |  23.0  |  0.88    Ca    8.8      01 Nov 2023 03:36  Mg     1.9     10-31    TPro  7.5  /  Alb  3.9  /  TBili  0.4  /  DBili  x   /  AST  39  /  ALT  25  /  AlkPhos  92  10-31    PT/INR - ( 31 Oct 2023 16:00 )   PT: 14.3 sec;   INR: 1.30 ratio         PTT - ( 01 Nov 2023 03:36 )  PTT:80.7 sec  Urinalysis Basic - ( 01 Nov 2023 03:36 )    Color: x / Appearance: x / SG: x / pH: x  Gluc: 148 mg/dL / Ketone: x  / Bili: x / Urobili: x   Blood: x / Protein: x / Nitrite: x   Leuk Esterase: x / RBC: x / WBC x   Sq Epi: x / Non Sq Epi: x / Bacteria: x      CAPILLARY BLOOD GLUCOSE          RADIOLOGY & ADDITIONAL TESTS:    ACC: [id number] EXAM:  XR CHEST PORTABLE URGENT 1V   ORDERED BY: QUINTIN VARGAS   	  	PROCEDURE DATE:  10/31/2023    	  	  	  	INTERPRETATION:  AP chest on October 31, 2023 at 4:18 PM. Patient has   	chest pain.  	  	Heart magnified by technique.  	  	Sternotomy, mitral pacemaker, and left loop recorder again noted.  	  	There are persistent small infiltrates off the right hilum slightly   	increased from August 1.  	  	IMPRESSION: Slightly increased infiltrates off the right hilum. Other   	findings stable.  	  	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 Presented to ED c/o Chest Pain described as mid-sternal pressure, radiation to the back, occurred while driving. EMS gave Aspirin, patient noted pain resolved. (1 Nov 2023 8:44)    INTERVAL HPI/OVERNIGHT EVENTS:  9 beats NSVT reported overnight    TODAY:  Seen and examined at bedside. Currently denies chest pain or SOB, denies BM since prior to admission. Patient was taken for PPM Interrogation and Catheterization.    MEDICATIONS  (STANDING):  atorvastatin 40 milliGRAM(s) Oral at bedtime  brimonidine 0.2% Ophthalmic Solution 1 Drop(s) Both EYES two times a day  clopidogrel Tablet 75 milliGRAM(s) Oral daily  heparin  Infusion.  Unit(s)/Hr (7.5 mL/Hr) IV Continuous <Continuous>  metoprolol succinate ER 25 milliGRAM(s) Oral daily  sacubitril 24 mG/valsartan 26 mG 1 Tablet(s) Oral two times a day  spironolactone 25 milliGRAM(s) Oral daily  timolol 0.5% Solution 1 Drop(s) Both EYES daily    MEDICATIONS  (PRN):  acetaminophen     Tablet .. 650 milliGRAM(s) Oral every 6 hours PRN Temp greater or equal to 38C (100.4F), Mild Pain (1 - 3)  aluminum hydroxide/magnesium hydroxide/simethicone Suspension 30 milliLiter(s) Oral every 4 hours PRN Dyspepsia  heparin   Injectable 3800 Unit(s) IV Push every 6 hours PRN For aPTT less than 40  melatonin 3 milliGRAM(s) Oral at bedtime PRN Insomnia  ondansetron Injectable 4 milliGRAM(s) IV Push every 8 hours PRN Nausea and/or Vomiting      Allergies    No Known Allergies    Intolerances        REVIEW OF SYSTEMS:  CONSTITUTIONAL: No fever, weight loss, or fatigue  RESPIRATORY: No cough, wheezing, chills or hemoptysis; No shortness of breath  CARDIOVASCULAR: No chest pain, palpitations, dizziness, or leg swelling  GASTROINTESTINAL: Some Abdominal Discomfort, No abdominal or epigastric pain. No nausea, vomiting, or hematemesis; No diarrhea or constipation. No melena or hematochezia.  NEUROLOGICAL: No headaches, memory loss, loss of strength, numbness, or tremors  MUSCULOSKELETAL: No joint pain or swelling; No muscle, back, or extremity pain      Vital Signs Last 24 Hrs  T(C): 36.6 (01 Nov 2023 04:35), Max: 36.8 (31 Oct 2023 19:30)  T(F): 97.8 (01 Nov 2023 04:35), Max: 98.2 (31 Oct 2023 19:30)  HR: 78 (01 Nov 2023 04:35) (69 - 79)  BP: 134/63 (01 Nov 2023 04:35) (76/43 - 165/67)  BP(mean): 67 (31 Oct 2023 16:35) (67 - 96)  RR: 18 (01 Nov 2023 04:35) (18 - 22)  SpO2: 95% (01 Nov 2023 04:35) (92% - 99%)    Parameters below as of 01 Nov 2023 04:35  Patient On (Oxygen Delivery Method): room air        PHYSICAL EXAM:  GENERAL: NAD,   HEAD:  Atraumatic, Normocephalic  EYES: EOMI, PERRLA, conjunctiva and sclera clear  NECK: Supple, No JVD, Normal thyroid  NERVOUS SYSTEM:  Alert & Oriented X3, No gross focal deficits  CHEST/LUNG: CTAB; No rales, rhonchi, wheezing, or rubs  HEART: Regular rate and rhythm; No murmurs, rubs, or gallops  ABDOMEN: Soft, Nontender, Nondistended; Bowel sounds present  EXTREMITIES:  No clubbing, cyanosis, or edema  SKIN: No rashes or lesions    LABS:                        11.5   6.62  )-----------( 172      ( 01 Nov 2023 03:36 )             34.8     11-01    140  |  104  |  27.1<H>  ----------------------------<  148<H>  3.8   |  23.0  |  0.88    Ca    8.8      01 Nov 2023 03:36  Mg     1.9     10-31    TPro  7.5  /  Alb  3.9  /  TBili  0.4  /  DBili  x   /  AST  39  /  ALT  25  /  AlkPhos  92  10-31    PT/INR - ( 31 Oct 2023 16:00 )   PT: 14.3 sec;   INR: 1.30 ratio         PTT - ( 01 Nov 2023 03:36 )  PTT:80.7 sec  Urinalysis Basic - ( 01 Nov 2023 03:36 )    Color: x / Appearance: x / SG: x / pH: x  Gluc: 148 mg/dL / Ketone: x  / Bili: x / Urobili: x   Blood: x / Protein: x / Nitrite: x   Leuk Esterase: x / RBC: x / WBC x   Sq Epi: x / Non Sq Epi: x / Bacteria: x      CAPILLARY BLOOD GLUCOSE          RADIOLOGY & ADDITIONAL TESTS:    ACC: [id number] EXAM:  XR CHEST PORTABLE URGENT 1V   ORDERED BY: QUINTIN VARGAS   	  	PROCEDURE DATE:  10/31/2023    	  	  	  	INTERPRETATION:  AP chest on October 31, 2023 at 4:18 PM. Patient has   	chest pain.  	  	Heart magnified by technique.  	  	Sternotomy, mitral pacemaker, and left loop recorder again noted.  	  	There are persistent small infiltrates off the right hilum slightly   	increased from August 1.  	  	IMPRESSION: Slightly increased infiltrates off the right hilum. Other   	findings stable.  	  	--- End of Report ---      Imaging Personally Reviewed:  [ ] YES  [ ] NO    Consultant(s) Notes Reviewed:  [ ] YES  [ ] NO    Care Discussed with Consultants/Other Providers [ ] YES  [ ] NO    Plan of Care discussed with Housestaff [ ]YES [ ] NO

## 2023-11-01 NOTE — PROGRESS NOTE ADULT - NS ATTEND AMEND GEN_ALL_CORE FT
Patient seen and examined by me.      Patient alert and awake.  Chest- Bilateral Clear BS  Cardiac- S1 and S2  Abdomen- Soft    Assessment/Plan:  1. Chest pain  2. CAD and other problems    Cath showed no significant disease.  Patient is being discharged home  No Lasix because patient came with hypotension to ER  Patient to be d/c d on Entresto, Toprol, Ranexa.  Patient to f/u with Dr Wallace in 1 week    I have discussed my recommendation with the PA which are outlined above.  Will sign off

## 2023-11-01 NOTE — PROGRESS NOTE ADULT - ASSESSMENT
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 Chest pain midsternal, radiating to back, 10/10, non reproducible, accompanied with heartburn, relieved with Asprin 325 and IV fluids from EMS. Troponin trending up, admitted for NSTEMI, cardiology following.    #NSTEMI   -Admit to telemetry   -ECG: TWI in lateral leads  -Echo: TTE 7/31/2023: LVEF 35-40%, severe pulmonary HTN, Grade II Diastolic dysfunction, mild MR/ AR, dilated aortic root.  -Troponin: 0.01 ---->0.17---->0.35  -Started on Heparin drip, monitor aPTT as per protocol   -Cardiology on board   -Continue Plavix 75mg   -Patient taken for PPM Interrogation  -Patient taken for Catheterization    #CHFrEF   -Entresto 24-26mg bid   -Spironolactone 25mg held  -Lasix 20mg (M/W/F)     #HTN/HLD/CAD  -Metoprolol ER 25mg with holding parameters  -Atorvastatin 40mg     #Hx of aflutter   -Eliquis 2.5mg bid (as per Dr. Conteh; as per discharge summary few weeks ago, it was 5mg bid)  -Eliquis on hold, since patient is on Heparin drip   -Rate controlled     #DM-2   -Hold Metformin 500mg bid   -FS q6h while NPO   -Will order Insulin sliding scale when diet is advanced    #Supportive   -DVT prophylaxis: on Heparin drip   -Diet: NPO

## 2023-11-01 NOTE — PROGRESS NOTE ADULT - PROBLEM SELECTOR PLAN 1
.  - EKG NSR with ST changes in lead V2-V6  - trops elevated overnight 0.17->0.35  - started on heparin GTT  - last cath 8/22 with PCI to mid LAD  - continue ASA/Plavix/Statin/BB  - NPO for C today

## 2023-11-01 NOTE — DISCHARGE NOTE PROVIDER - NSDCFUSCHEDAPPT_GEN_ALL_CORE_FT
Robert Wallace  [facility]  CARDIOLOGY [address] [facility]  Scheduled Appointment: 11/03/2023    [facility]  [address]OLOGY [address]  Scheduled Appointment: 11/21/2023    Felipe Hartley  [facility]  ELECTROPH [address]  Scheduled Appointment: 12/12/2023

## 2023-11-01 NOTE — PROGRESS NOTE ADULT - ASSESSMENT
with chest pain in the setting of extensive cardiac history, PMH:  CAD s/p CABG (1995 [place]) and PCI (most recent PATRIC to RCA 7/2022), ICM (EF 30-35% 12/2022, now 35-40% on TTE 7/31/2023), HFrEF, high-grade AV block status post Micra AV leadless (MDT ILR implant w/ Dr Sanchez on 7/31/2023), atrial flutter ablation,  DM, HTN, carotid stenosis & HLD, hypertension, hyperlipidemia, and diabetes,   Chest pain:

## 2023-11-01 NOTE — DISCHARGE NOTE PROVIDER - NSDCCPCAREPLAN_GEN_ALL_CORE_FT
PRINCIPAL DISCHARGE DIAGNOSIS  Diagnosis: Angina pectoris  Assessment and Plan of Treatment: LHC done which did not show significant disease

## 2023-11-01 NOTE — DISCHARGE NOTE PROVIDER - ATTENDING DISCHARGE PHYSICAL EXAMINATION:
VITALS:   T(C): 36.2 (11-01-23 @ 09:54), Max: 36.8 (10-31-23 @ 19:30)  HR: 64 (11-01-23 @ 14:35) (56 - 79)  BP: 134/59 (11-01-23 @ 14:35) (76/43 - 165/67)  RR: 20 (11-01-23 @ 14:35) (15 - 22)  SpO2: 97% (11-01-23 @ 14:35) (92% - 100%)    GENERAL: NAD, lying in bed comfortably  HEAD:  Atraumatic, Normocephalic  EYES: EOMI, PERRLA, conjunctiva and sclera clear  ENT: Moist mucous membranes  NECK: Supple, No JVD  CHEST/LUNG: Clear to auscultation bilaterally; No rales, rhonchi, wheezing, or rubs. Unlabored respirations  HEART: Regular rate and rhythm; No murmurs, rubs, or gallops  ABDOMEN: BSx4; Soft, nontender, nondistended  EXTREMITIES:  2+ Peripheral Pulses, brisk capillary refill. No clubbing, cyanosis, or edema  NERVOUS SYSTEM:  A&Ox3, no focal deficits   SKIN: No rashes or lesions  PSYCH: Normal affect, euthymic mood

## 2023-11-01 NOTE — DISCHARGE NOTE NURSING/CASE MANAGEMENT/SOCIAL WORK - NSDCPEFALRISK_GEN_ALL_CORE
For information on Fall & Injury Prevention, visit: [email or web address] OR  [email or web address] OR  https://www.cdc.gov/steadi/patient.html

## 2023-11-01 NOTE — DISCHARGE NOTE PROVIDER - CARE PROVIDER_API CALL
Robert Wallace  Cardiovascular Disease  [address] [facility], [address] [place] [id number]  Phone: [phone]  Fax: [phone]  Follow Up Time: 1 week

## 2023-11-01 NOTE — PROCEDURE NOTE - ADDITIONAL PROCEDURE DETAILS
battery life about 7 years, Device working appropriately, Micra does not store events however ILR checked separately and showed no significant events.

## 2023-11-01 NOTE — PROGRESS NOTE ADULT - SUBJECTIVE AND OBJECTIVE BOX
[facility]                                                         CARDIOLOGY AT [facility]                                                                  [address] [facility], [place] [address]                                                         Telephone: [phone]. Fax:[phone]                                                                             PROGRESS NOTE    Reason for follow up: NSTEMI  Update: trop elevated overnight, also with st changes in anterolateral leads, no chest pain, plan for LHC. on heparin GTT. 9 beats NSVT overnight      Review of symptoms:   Cardiac:  No chest pain. No dyspnea. No palpitations.  Respiratory: no cough. No dyspnea  Gastrointestinal: No diarrhea. No abdominal pain. No bleeding.   Neuro: No focal neuro complaints.    Vitals:  T(C): 36.2 (11-01-23 @ 09:54), Max: 36.8 (10-31-23 @ 19:30)  HR: 70 (11-01-23 @ 09:54) (65 - 79)  BP: 115/76 (11-01-23 @ 09:54) (76/43 - 165/67)  RR: 15 (11-01-23 @ 09:54) (15 - 22)  SpO2: 95% (11-01-23 @ 09:54) (92% - 99%)  Wt(kg): --  I&O's Summary    31 Oct 2023 07:01  -  01 Nov 2023 07:00  --------------------------------------------------------  IN: 0 mL / OUT: 150 mL / NET: -150 mL      Weight (kg): 62.9 (10-31 @ 16:54)    PHYSICAL EXAM:  Appearance: Comfortable. No acute distress  HEENT:  Atraumatic. Normocephalic.  Normal oral mucosa  Neurologic: A & O x 3, no gross focal deficits.  Cardiovascular: NSR occasional paced S1 S2, No murmur, no rubs/gallops. No JVD  Respiratory: Lungs clear to auscultation, unlabored   Gastrointestinal:  Soft, Non-tender, + BS  Lower Extremities: 2+ Peripheral Pulses, No clubbing, cyanosis, or edema  Psychiatry: Patient is calm. No agitation.   Skin: warm and dry.    CURRENT CARDIAC MEDICATIONS:  metoprolol succinate ER 25 milliGRAM(s) Oral daily  sacubitril 24 mG/valsartan 26 mG 1 Tablet(s) Oral two times a day      CURRENT OTHER MEDICATIONS:  acetaminophen     Tablet .. 650 milliGRAM(s) Oral every 6 hours PRN Temp greater or equal to 38C (100.4F), Mild Pain (1 - 3)  melatonin 3 milliGRAM(s) Oral at bedtime PRN Insomnia  ondansetron Injectable 4 milliGRAM(s) IV Push every 8 hours PRN Nausea and/or Vomiting  aluminum hydroxide/magnesium hydroxide/simethicone Suspension 30 milliLiter(s) Oral every 4 hours PRN Dyspepsia  atorvastatin 40 milliGRAM(s) Oral at bedtime  aspirin  chewable 81 milliGRAM(s) Oral once, Stop order after: 1 Doses  brimonidine 0.2% Ophthalmic Solution 1 Drop(s) Both EYES two times a day  clopidogrel Tablet 75 milliGRAM(s) Oral daily  heparin   Injectable 3800 Unit(s) IV Push every 6 hours PRN For aPTT less than 40  heparin  Infusion.  Unit(s)/Hr (7.5 mL/Hr) IV Continuous <Continuous>  timolol 0.5% Solution 1 Drop(s) Both EYES daily      LABS:	 	  ( 31 Oct 2023 22:13 )  Troponin T  0.35<H>,  CPK  X    , CKMB  X    , BNP X        , ( 31 Oct 2023 19:36 )  Troponin T  0.17<H>,  CPK  X    , CKMB  X    , BNP X        , ( 31 Oct 2023 16:00 )  Troponin T  <0.01,  CPK  X    , CKMB  X    , BNP X                                  11.5   6.62  )-----------( 172      ( 01 Nov 2023 03:36 )             34.8     11-01    140  |  104  |  27.1<H>  ----------------------------<  148<H>  3.8   |  23.0  |  0.88    Ca    8.8      01 Nov 2023 03:36  Mg     1.9     10-31    TPro  7.5  /  Alb  3.9  /  TBili  0.4  /  DBili  x   /  AST  39  /  ALT  25  /  AlkPhos  92  10-31    PT/INR/PTT ( 01 Nov 2023 03:36 )                       :                       :      X            :       80.7                  .        .                   .              .           .       X           .                                       Lipid Profile:   HgA1c:   TSH:     TELEMETRY: NSR occasionally paced, NSVT  ECG:    DIAGNOSTIC TESTING:  [ ] Echocardiogram:   < from: TTE Echo Limited or F/U (10.31.23 @ 19:46) >  PHYSICIAN INTERPRETATION:  Left Ventricle: Endocardial visualization was enhanced with intravenous   echo contrast. The left ventricular internal cavity size is normal.  Global LV systolic function was moderately decreased. Left ventricular   ejection fraction, by visual estimation, is 35 to 40%. Spectral Doppler   shows pseudonormal pattern of left ventricular myocardial filling (Grade   II diastolic dysfunction).  Right Ventricle: The right ventricular size is normal. RV systolic   function is mildly reduced.  Left Atrium: Mildly enlarged left atrium.  Right Atrium: Moderately enlarged right atrium.  Pericardium: There is no evidence of pericardial effusion.  Mitral Valve: Thickening of the anterior and posterior mitral valve   leaflets. There is mild mitral annular calcification. Mild mitral valve   regurgitation is seen. The mean transvalvular gradient is 2.2 mmHg at 74   bpm.  Tricuspid Valve: Mild-moderate tricuspidregurgitation is visualized.   Estimated pulmonary artery systolic pressure is 75.8 mmHg assuming a   right atrial pressure of 3 mmHg, which is consistent with severe   pulmonary hypertension.  Aortic Valve: The aortic valve is trileaflet. No evidence of aortic   stenosis. Sclerotic aortic valve with normal opening. Moderate aortic   valve regurgitation is seen.  Pulmonic Valve: The pulmonic valve was not well visualized.  Aorta: There is dilatation of the ascending aorta and aortic root.  Venous: The inferior vena cava was normal sized, with respiratory size   variation greater than 50%.      Summary:   1. Left ventricular ejection fraction, by visual estimation, is 35 to   40%.   2. Moderately decreased global left ventricular systolic function.   3. Spectral Doppler shows pseudonormal pattern of left ventricular   myocardial filling (Grade II diastolic dysfunction).   4. There is mild left ventricular hypertrophy.   5. Mildly reduced RV systolic function.   6. Mildly enlarged left atrium.   7. Moderately enlarged right atrium.   8. Mild mitral annular calcification.   9. Mild mitral valve regurgitation.  10. Thickening of the anterior and posterior mitral valve leaflets.  11. Mild-moderate tricuspid regurgitation.  12. Moderate aortic regurgitation.  13. Sclerotic aortic valve with normal opening.  14. Dilatation of the ascending aorta and aortic root.  15. Estimated pulmonary artery systolic pressure is 75.8 mmHg assuming a   right atrial pressure of 3 mmHg, which is consistent with severe   pulmonary hypertension.  16. Endocardial visualization was enhanced with intravenous echo contrast.    [place], Electronically signed on 11/1/2023 at 7:31:11 AM    < end of copied text >  [ ]  Catheterization:  Cath 8/2022:  Significant stenosis of the mid LAD distal to the LIMA anastomosis - Successful stent to the mid LAD.  [ ] Stress Test:    OTHER:

## 2023-11-01 NOTE — DISCHARGE NOTE PROVIDER - NSDCCPTREATMENT_GEN_ALL_CORE_FT
PRINCIPAL PROCEDURE  Procedure: Left heart catheterization  Findings and Treatment: No significant cardiac disease

## 2023-11-01 NOTE — GOALS OF CARE CONVERSATION - ADVANCED CARE PLANNING - CONVERSATION DETAILS
Advance care directive discussed with patient and son at bed side. Patient named his wife as the health care proxy. He has not had this discussion in the past with family. He will like trial of CPR and intubation as needed but will not want to be prolonged on machine. Patient is FULL CODE.

## 2023-11-01 NOTE — DISCHARGE NOTE NURSING/CASE MANAGEMENT/SOCIAL WORK - PATIENT PORTAL LINK FT
You can access the FollowMyHealth Patient Portal offered by [facility] by registering at the following website: [email or web address]. By joining [employer]’s FollowMyHealth portal, you will also be able to view your health information using other applications (apps) compatible with our system.

## 2023-11-02 DIAGNOSIS — Z95.0 PRESENCE OF CARDIAC PACEMAKER: ICD-10-CM

## 2023-11-03 ENCOUNTER — APPOINTMENT (OUTPATIENT)
Dept: CARDIOLOGY | Facility: CLINIC | Age: 88
End: 2023-11-03
Payer: MEDICARE

## 2023-11-03 VITALS — DIASTOLIC BLOOD PRESSURE: 62 MMHG | OXYGEN SATURATION: 99 % | SYSTOLIC BLOOD PRESSURE: 98 MMHG | HEART RATE: 58 BPM

## 2023-11-03 DIAGNOSIS — I21.4 NON-ST ELEVATION (NSTEMI) MYOCARDIAL INFARCTION: ICD-10-CM

## 2023-11-03 PROCEDURE — 99215 OFFICE O/P EST HI 40 MIN: CPT

## 2023-11-03 RX ORDER — METFORMIN HYDROCHLORIDE 500 MG/1
500 TABLET, COATED ORAL TWICE DAILY
Refills: 0 | Status: DISCONTINUED | COMMUNITY
End: 2023-11-03

## 2023-11-06 ENCOUNTER — RX CHANGE (OUTPATIENT)
Age: 88
End: 2023-11-06

## 2023-11-13 PROCEDURE — 86850 RBC ANTIBODY SCREEN: CPT

## 2023-11-13 PROCEDURE — 80053 COMPREHEN METABOLIC PANEL: CPT

## 2023-11-13 PROCEDURE — 83735 ASSAY OF MAGNESIUM: CPT

## 2023-11-13 PROCEDURE — 82962 GLUCOSE BLOOD TEST: CPT

## 2023-11-13 PROCEDURE — C1887: CPT

## 2023-11-13 PROCEDURE — 96374 THER/PROPH/DIAG INJ IV PUSH: CPT

## 2023-11-13 PROCEDURE — 85027 COMPLETE CBC AUTOMATED: CPT

## 2023-11-13 PROCEDURE — 86901 BLOOD TYPING SEROLOGIC RH(D): CPT

## 2023-11-13 PROCEDURE — 71045 X-RAY EXAM CHEST 1 VIEW: CPT

## 2023-11-13 PROCEDURE — 71275 CT ANGIOGRAPHY CHEST: CPT | Mod: MA

## 2023-11-13 PROCEDURE — C1894: CPT

## 2023-11-13 PROCEDURE — C1769: CPT

## 2023-11-13 PROCEDURE — 74174 CTA ABD&PLVS W/CONTRAST: CPT | Mod: MA

## 2023-11-13 PROCEDURE — 80048 BASIC METABOLIC PNL TOTAL CA: CPT

## 2023-11-13 PROCEDURE — 93457 R HRT ART/GRFT ANGIO: CPT

## 2023-11-13 PROCEDURE — 93005 ELECTROCARDIOGRAM TRACING: CPT

## 2023-11-13 PROCEDURE — 93308 TTE F-UP OR LMTD: CPT

## 2023-11-13 PROCEDURE — 86900 BLOOD TYPING SEROLOGIC ABO: CPT

## 2023-11-13 PROCEDURE — 85025 COMPLETE CBC W/AUTO DIFF WBC: CPT

## 2023-11-13 PROCEDURE — 36415 COLL VENOUS BLD VENIPUNCTURE: CPT

## 2023-11-13 PROCEDURE — 85610 PROTHROMBIN TIME: CPT

## 2023-11-13 PROCEDURE — 83880 ASSAY OF NATRIURETIC PEPTIDE: CPT

## 2023-11-13 PROCEDURE — 84484 ASSAY OF TROPONIN QUANT: CPT

## 2023-11-13 PROCEDURE — 99285 EMERGENCY DEPT VISIT HI MDM: CPT

## 2023-11-13 PROCEDURE — 85730 THROMBOPLASTIN TIME PARTIAL: CPT

## 2023-11-21 ENCOUNTER — APPOINTMENT (OUTPATIENT)
Dept: CARDIOLOGY | Facility: CLINIC | Age: 88
End: 2023-11-21

## 2023-12-01 ENCOUNTER — APPOINTMENT (OUTPATIENT)
Dept: ELECTROPHYSIOLOGY | Facility: CLINIC | Age: 88
End: 2023-12-01
Payer: MEDICARE

## 2023-12-01 ENCOUNTER — NON-APPOINTMENT (OUTPATIENT)
Age: 88
End: 2023-12-01

## 2023-12-01 PROCEDURE — 93298 REM INTERROG DEV EVAL SCRMS: CPT

## 2023-12-01 PROCEDURE — G2066: CPT | Mod: NC

## 2023-12-11 ENCOUNTER — OFFICE (OUTPATIENT)
Dept: URBAN - METROPOLITAN AREA CLINIC 115 | Facility: CLINIC | Age: 88
Setting detail: OPHTHALMOLOGY
End: 2023-12-11
Payer: MEDICARE

## 2023-12-11 DIAGNOSIS — H47.291: ICD-10-CM

## 2023-12-11 DIAGNOSIS — H40.1113: ICD-10-CM

## 2023-12-11 DIAGNOSIS — H40.1122: ICD-10-CM

## 2023-12-11 DIAGNOSIS — H01.004: ICD-10-CM

## 2023-12-11 DIAGNOSIS — H01.001: ICD-10-CM

## 2023-12-11 PROCEDURE — 92133 CPTRZD OPH DX IMG PST SGM ON: CPT | Performed by: OPHTHALMOLOGY

## 2023-12-11 PROCEDURE — 99213 OFFICE O/P EST LOW 20 MIN: CPT | Performed by: OPHTHALMOLOGY

## 2023-12-11 PROCEDURE — 92083 EXTENDED VISUAL FIELD XM: CPT | Performed by: OPHTHALMOLOGY

## 2023-12-11 ASSESSMENT — REFRACTION_MANIFEST
OS_AXIS: 180
OD_VA1: 20/FC
OD_SPHERE: BAL
OS_VA1: 20/50-
OS_CYLINDER: -0.50
OS_SPHERE: -1.00

## 2023-12-11 ASSESSMENT — REFRACTION_CURRENTRX
OS_OVR_VA: 20/
OS_OVR_VA: 20/
OS_VPRISM_DIRECTION: SV
OD_SPHERE: +2.75
OS_SPHERE: +2.75
OD_OVR_VA: 20/
OD_OVR_VA: 20/
OS_SPHERE: +2.75
OD_SPHERE: +2.75
OD_VPRISM_DIRECTION: SV

## 2023-12-11 ASSESSMENT — REFRACTION_AUTOREFRACTION
OD_SPHERE: -2.50
OS_SPHERE: -0.50
OD_CYLINDER: -1.75
OS_CYLINDER: -0.75
OD_AXIS: 162
OS_AXIS: 122

## 2023-12-11 ASSESSMENT — LID EXAM ASSESSMENTS
OD_BLEPHARITIS: RUL 1+
OS_BLEPHARITIS: LUL 1+

## 2023-12-11 ASSESSMENT — SPHEQUIV_DERIVED
OS_SPHEQUIV: -0.875
OD_SPHEQUIV: -3.375
OS_SPHEQUIV: -1.25

## 2023-12-12 ENCOUNTER — APPOINTMENT (OUTPATIENT)
Dept: ELECTROPHYSIOLOGY | Facility: CLINIC | Age: 88
End: 2023-12-12
Payer: MEDICARE

## 2023-12-12 VITALS
HEART RATE: 64 BPM | OXYGEN SATURATION: 99 % | SYSTOLIC BLOOD PRESSURE: 132 MMHG | BODY MASS INDEX: 24.4 KG/M2 | HEIGHT: 62.5 IN | DIASTOLIC BLOOD PRESSURE: 54 MMHG | WEIGHT: 136 LBS

## 2023-12-12 PROCEDURE — 93279 PRGRMG DEV EVAL PM/LDLS PM: CPT

## 2023-12-12 PROCEDURE — 99214 OFFICE O/P EST MOD 30 MIN: CPT

## 2023-12-12 PROCEDURE — 93000 ELECTROCARDIOGRAM COMPLETE: CPT | Mod: 59

## 2023-12-12 RX ORDER — LATANOPROST/PF 0.005 %
0.01 DROPS OPHTHALMIC (EYE)
Refills: 0 | Status: ACTIVE | COMMUNITY

## 2023-12-12 RX ORDER — FUROSEMIDE 20 MG/1
20 TABLET ORAL DAILY
Qty: 30 | Refills: 3 | Status: DISCONTINUED | COMMUNITY
Start: 2023-11-03 | End: 2023-12-12

## 2023-12-12 RX ORDER — ATORVASTATIN CALCIUM 40 MG/1
40 TABLET, FILM COATED ORAL DAILY
Refills: 0 | Status: DISCONTINUED | COMMUNITY
End: 2023-12-12

## 2023-12-13 RX ORDER — CLOPIDOGREL 75 MG/1
75 TABLET, FILM COATED ORAL DAILY
Refills: 0 | Status: DISCONTINUED | COMMUNITY
End: 2023-12-13

## 2023-12-26 ENCOUNTER — RX CHANGE (OUTPATIENT)
Age: 88
End: 2023-12-26

## 2024-01-01 NOTE — ED ADULT NURSE REASSESSMENT NOTE - NS ED NURSE REASSESS COMMENT FT1
pt remains a&ox3, denies any pain/discomfort. wife @ bedside. ambulating around unit. pending results for d/c. updated on plan of care, verbalize understanding. call bell in reach Statement Selected stated

## 2024-01-08 ENCOUNTER — RX ONLY (RX ONLY)
Age: 89
End: 2024-01-08

## 2024-01-08 ENCOUNTER — OFFICE (OUTPATIENT)
Dept: URBAN - METROPOLITAN AREA CLINIC 115 | Facility: CLINIC | Age: 89
Setting detail: OPHTHALMOLOGY
End: 2024-01-08
Payer: MEDICARE

## 2024-01-08 DIAGNOSIS — H40.1113: ICD-10-CM

## 2024-01-08 DIAGNOSIS — H01.004: ICD-10-CM

## 2024-01-08 DIAGNOSIS — H47.291: ICD-10-CM

## 2024-01-08 DIAGNOSIS — H01.001: ICD-10-CM

## 2024-01-08 PROCEDURE — 99212 OFFICE O/P EST SF 10 MIN: CPT | Performed by: OPHTHALMOLOGY

## 2024-01-08 ASSESSMENT — REFRACTION_MANIFEST
OS_SPHERE: PL
OS_VA1: 20/25
OD_SPHERE: PL
OD_VA1: 20/FC
OS_AXIS: 120
OS_CYLINDER: -1.00
OS_VA1: 20/50-
OS_SPHERE: -1.00
OS_CYLINDER: -0.50
OD_SPHERE: BAL
OS_AXIS: 180

## 2024-01-08 ASSESSMENT — REFRACTION_CURRENTRX
OS_OVR_VA: 20/
OD_SPHERE: +2.75
OS_SPHERE: +2.75
OS_AXIS: 119
OD_VPRISM_DIRECTION: SV
OS_OVR_VA: 20/
OS_OVR_VA: 20/
OS_VPRISM_DIRECTION: SV
OD_OVR_VA: 20/
OS_SPHERE: +2.75
OD_SPHERE: PLANO
OD_OVR_VA: 20/
OS_VPRISM_DIRECTION: SV
OD_SPHERE: +2.75
OD_OVR_VA: 20/
OS_CYLINDER: -1.00
OS_SPHERE: PLANO

## 2024-01-08 ASSESSMENT — REFRACTION_AUTOREFRACTION
OS_SPHERE: -0.50
OD_SPHERE: -2.50
OD_CYLINDER: -1.75
OS_CYLINDER: -0.75
OS_AXIS: 122
OD_AXIS: 162

## 2024-01-08 ASSESSMENT — LID EXAM ASSESSMENTS
OS_BLEPHARITIS: LUL 1+
OD_BLEPHARITIS: RUL 1+

## 2024-01-08 ASSESSMENT — SPHEQUIV_DERIVED
OS_SPHEQUIV: -1.25
OS_SPHEQUIV: -0.875
OD_SPHEQUIV: -3.375

## 2024-01-12 ENCOUNTER — APPOINTMENT (OUTPATIENT)
Dept: CARDIOLOGY | Facility: CLINIC | Age: 89
End: 2024-01-12
Payer: MEDICARE

## 2024-01-12 VITALS — BODY MASS INDEX: 25.2 KG/M2 | WEIGHT: 140 LBS

## 2024-01-12 VITALS — OXYGEN SATURATION: 96 % | DIASTOLIC BLOOD PRESSURE: 70 MMHG | SYSTOLIC BLOOD PRESSURE: 110 MMHG | HEART RATE: 54 BPM

## 2024-01-12 DIAGNOSIS — R60.0 LOCALIZED EDEMA: ICD-10-CM

## 2024-01-12 PROCEDURE — 99214 OFFICE O/P EST MOD 30 MIN: CPT

## 2024-01-12 PROCEDURE — 93000 ELECTROCARDIOGRAM COMPLETE: CPT

## 2024-01-12 RX ORDER — PANTOPRAZOLE 40 MG/1
40 TABLET, DELAYED RELEASE ORAL DAILY
Qty: 90 | Refills: 1 | Status: ACTIVE | COMMUNITY
Start: 1900-01-01 | End: 1900-01-01

## 2024-01-12 RX ORDER — TIMOLOL MALEATE 5 MG/ML
0.5 SOLUTION OPHTHALMIC
Refills: 0 | Status: DISCONTINUED | COMMUNITY
End: 2024-01-12

## 2024-01-12 RX ORDER — METOPROLOL SUCCINATE 25 MG/1
25 TABLET, EXTENDED RELEASE ORAL DAILY
Qty: 90 | Refills: 3 | Status: ACTIVE | COMMUNITY
Start: 1900-01-01 | End: 1900-01-01

## 2024-01-12 NOTE — HISTORY OF PRESENT ILLNESS
[FreeTextEntry1] : 89M h/o CAD s/p CABG (1995 [place]) and PCI (most recent PATRIC to RCA 7/2022), ICM (EF 30-35% 12/2022, now 35-40% on TTE 7/31/2023), HFrEF, DM2, HTN, carotid stenosis & HLD, who presents for follow up after hospitalization. He initially presented to ED 7/28/2023, found to have acute CHF, JAMARI, mild bradycardia in setting of new onset atrial flutter- he left AMA the same day. He then returned to the hospital on 7/29/23 with more significant bradycardia. He was admitted 7/30/23 with slow atrial flutter with rates in the 40s and intermittent CHB with narrow escape and frequent pauses up to 20 seconds in duration associated with symptoms of presyncope and syncope. He then underwent typical atrial flutter ablation (CTI line), EPS >HV 65msec, MDT Micrjona AV leadless PPM implant and MDT ILR implant w/ Dr Sanchez on 7/31/2023, previously followed by [facility], presents to establish care 8/2023 with fluid overload need daily Lasix and added spironolactone, last seen 10/2023, interval admitted to [facility] 10/31-11/1/23 for chest pain with elevated Troponin 0.35 repeat cath no significant obstructive disease medically managed, pBNP 2927, repeat Echo LV EF 35-40% with stable moderate AI and off Lasix as was hypotensive in the ER, discharged on Toprol, Entresto and Ranexa, last seen 11/2023 switched metformin to Jardiance for HF/diabetes, reports bilateral legs weakness/soreness, started on Lasix 20mg by PCP, presents for follow up.   Patient presents with his daughter for visit, reports feeling well, has intermittent LE edema taking Lasix 20mg about 4x per week as he does not want frequent urination at night time, still active driving and walking with his cane, denies falls/bleeding, has intermittent midsternal chest discomfort reports heart burn but not taking daily PPI.    Prior visit 11/2023:  Patient presents with his daughter for the visit, reports feeling well no recurrence chest pain except feeling lightheaded at times, low SBP 90s at time, lives at home with his wife, denies recent fall or syncope. L-radial access site with forearm bruises but no pain, has +2 radial pulses. Recent ILR report from 9/2023 with false AF detection due to undersensing, remains in sinus rhythm.  He is seeing his PCP next week.  10/17/2023 Patient here for follow up. He is accompanied by his daughter. They were told to come in by PCP for medication adjustment due to hypotension and hyperkalemia. He c/o feeling lightheaded and having low BPs since addition of spironolactone. States leg edema has improved. Denies chest pain, SOB, orthopnea, or syncope. Did have a fall couple weeks ago, denies injury. No event on ILR   Prior visit 8/2023: Today, he is accompanied by his wife, Joleen, and daughter, Destiny. Reports feeling overall well. Reports BL LE edema, worse than usual baseline (prior to hospitalization) but now improving since increasing the frequency of Lasix last week. He was instructed to increase Lasix to daily as of last week, he took it Monday through Friday. Denies chest pain, SOB at rest, FINNEGAN, orthopnea, PND, palpitations, lightheadedness, dizziness, fatigue, syncope, near syncope.

## 2024-01-12 NOTE — PHYSICAL EXAM
[Well Developed] : well developed [Well Nourished] : well nourished [No Acute Distress] : no acute distress [Normal Conjunctiva] : normal conjunctiva [Normal Venous Pressure] : normal venous pressure [No Carotid Bruit] : no carotid bruit [Normal S1, S2] : normal S1, S2 [No Murmur] : no murmur [No Rub] : no rub [No Gallop] : no gallop [Clear Lung Fields] : clear lung fields [Good Air Entry] : good air entry [No Respiratory Distress] : no respiratory distress  [Soft] : abdomen soft [Non Tender] : non-tender [No Masses/organomegaly] : no masses/organomegaly [Normal Bowel Sounds] : normal bowel sounds [Abnormal Gait] : abnormal gait [No Cyanosis] : no cyanosis [No Clubbing] : no clubbing [No Varicosities] : no varicosities [No Rash] : no rash [No Skin Lesions] : no skin lesions [Moves all extremities] : moves all extremities [No Focal Deficits] : no focal deficits [Normal Speech] : normal speech [Normal memory] : normal memory [Alert and Oriented] : alert and oriented [de-identified] : 1+ pitting edema to bilateral shins

## 2024-01-12 NOTE — CARDIOLOGY SUMMARY
[de-identified] : 8/14/2023: V paced, SR, lateral infarct age undetermined, QTc 486.  [de-identified] : TTE 7/31/2023: LVEF 35-40%, severe pulmonary HTN, Grade II Diastolic dysfunction, mild MR/ AR, dilated aortic root.  10/31/23- Summary:  1. Left ventricular ejection fraction, by visual estimation, is 35 to  40%.  2. Moderately decreased global left ventricular systolic function.  3. Spectral Doppler shows pseudonormal pattern of left ventricular  myocardial filling (Grade II diastolic dysfunction).  4. There is mild left ventricular hypertrophy.  5. Mildly reduced RV systolic function.  6. Mildly enlarged left atrium.  7. Moderately enlarged right atrium.  8. Mild mitral annular calcification.  9. Mild mitral valve regurgitation. 10. Thickening of the anterior and posterior mitral valve leaflets. 11. Mild-moderate tricuspid regurgitation. 12. Moderate aortic regurgitation. 13. Sclerotic aortic valve with normal opening. 14. Dilatation of the ascending aorta and aortic root. 15. Estimated pulmonary artery systolic pressure is 75.8 mmHg assuming a  right atrial pressure of 3 mmHg, which is consistent with severe  pulmonary hypertension. 16. Endocardial visualization was enhanced with intravenous echo contrast.

## 2024-01-12 NOTE — DISCUSSION/SUMMARY
[FreeTextEntry1] : 89M h/o CAD s/p CABG (1995 [place]) and PCI (most recent PATRIC to RCA 7/2022), ICM (EF 30-35% 12/2022, now 35-40% on TTE 7/31/2023), HFrEF, DM2, HTN, carotid stenosis & HLD, who presents for follow up after hospitalization. He initially presented to ED 7/28/2023, found to have acute CHF, JAMARI, mild bradycardia in setting of new onset atrial flutter- he left AMA the same day. He then returned to the hospital on 7/29/23 with more significant bradycardia. He was admitted 7/30/23 with slow atrial flutter with rates in the 40s and intermittent CHB with narrow escape and frequent pauses up to 20 seconds in duration associated with symptoms of presyncope and syncope. He then underwent typical atrial flutter ablation (CTI line), EPS >HV 65msec, MDT Micra AV leadless PPM implant and MDT ILR implant w/ Dr Sanchez on 7/31/2023, previously followed by [place] [facility]ology, presents to establish care 8/2023 with fluid overload need daily Lasix and added spironolactone, last seen 10/2023, interval admitted to [facility] 10/31-11/1/23 for chest pain with elevated Troponin 0.35 repeat cath no significant obstructive disease medically managed, pBNP 2927, repeat Echo LV EF 35-40% with stable moderate AI and off Lasix as was hypotensive in the ER, discharged on Toprol, Entresto and Ranexa, last seen 11/2023 switched metformin to Jardiance for HF/diabetes, reports bilateral legs weakness/soreness, started on Lasix 20mg by PCP, presents for follow up.   Overall stable, has 1+ bilateral legs edema but no pulmonary edema, advised need to use Lasix 20mg once daily, continue Jardiance, prior NSTEMI without obstructive disease suspect microvascular disease vs. MINOCA, baseline low SBP continue Ranexa as antianginal, also need daily PPI for acid reflux.    1. CAD/ICM, HFrEF -change to daily Lasix 20mg to be taken in the AM -off spironolactone due to low BP -continue Jardiance 25mg, need repeat BMP and pBNP to be drawn at home next week.  -continue metoprolol succinate at bedtime and low dose Entresto hold if SBP <90  2. CAD/MI: s/p repeat cath (11/2023) no new obstructive disease, pending official report, EF 35-40%, Moderate to severe MR, Moderate AR. EF 35 to 40% 11/2023 - continue clopidogrel, atorvastatin and Ranexa  3. pAtrial flutter with underlying AVB with periods of asystole - s/p flutter ablation, micra leadless PPM implant and ILR insertion by Dr. Sanchez on 7/31/2023:  - continue Metoprolol and Eliquis.  - EP follow up with remote monitor check next week  4. DM2 -A1c 6.8 in 7/2023, off metformin switched to Jardiance 25mg   Follow up in 4 months.    [EKG obtained to assist in diagnosis and management of assessed problem(s)] : EKG obtained to assist in diagnosis and management of assessed problem(s)

## 2024-01-16 ENCOUNTER — APPOINTMENT (OUTPATIENT)
Dept: ELECTROPHYSIOLOGY | Facility: CLINIC | Age: 89
End: 2024-01-16
Payer: MEDICARE

## 2024-01-16 ENCOUNTER — NON-APPOINTMENT (OUTPATIENT)
Age: 89
End: 2024-01-16

## 2024-01-16 PROCEDURE — 93298 REM INTERROG DEV EVAL SCRMS: CPT

## 2024-01-30 ENCOUNTER — OFFICE (OUTPATIENT)
Dept: URBAN - METROPOLITAN AREA CLINIC 115 | Facility: CLINIC | Age: 89
Setting detail: OPHTHALMOLOGY
End: 2024-01-30
Payer: MEDICARE

## 2024-01-30 DIAGNOSIS — H01.001: ICD-10-CM

## 2024-01-30 DIAGNOSIS — H47.291: ICD-10-CM

## 2024-01-30 DIAGNOSIS — H40.1113: ICD-10-CM

## 2024-01-30 DIAGNOSIS — H01.004: ICD-10-CM

## 2024-01-30 PROCEDURE — 99212 OFFICE O/P EST SF 10 MIN: CPT | Performed by: OPHTHALMOLOGY

## 2024-01-30 ASSESSMENT — REFRACTION_CURRENTRX
OD_OVR_VA: 20/
OD_OVR_VA: 20/
OS_VPRISM_DIRECTION: SV
OD_VPRISM_DIRECTION: SV
OS_SPHERE: +2.75
OS_CYLINDER: -1.00
OD_SPHERE: +2.75
OD_OVR_VA: 20/
OS_OVR_VA: 20/
OS_OVR_VA: 20/
OS_VPRISM_DIRECTION: SV
OD_SPHERE: PLANO
OS_AXIS: 119
OS_SPHERE: PLANO
OD_SPHERE: +2.75
OS_SPHERE: +2.75
OS_OVR_VA: 20/

## 2024-01-30 ASSESSMENT — REFRACTION_MANIFEST
OS_AXIS: 120
OS_SPHERE: PL
OS_SPHERE: -1.00
OD_VA1: 20/FC
OS_CYLINDER: -0.50
OS_VA1: 20/25
OS_AXIS: 180
OD_SPHERE: PL
OS_VA1: 20/50-
OD_SPHERE: BAL
OS_CYLINDER: -1.00

## 2024-01-30 ASSESSMENT — SPHEQUIV_DERIVED
OS_SPHEQUIV: -1.25
OD_SPHEQUIV: -3.5
OS_SPHEQUIV: -0.75

## 2024-01-30 ASSESSMENT — REFRACTION_AUTOREFRACTION
OS_AXIS: 144
OD_AXIS: 162
OS_CYLINDER: -1.00
OS_SPHERE: -0.25
OD_SPHERE: -2.50
OD_CYLINDER: -2.00

## 2024-01-30 ASSESSMENT — LID EXAM ASSESSMENTS
OS_BLEPHARITIS: LUL 1+
OD_BLEPHARITIS: RUL 1+

## 2024-02-19 ENCOUNTER — NON-APPOINTMENT (OUTPATIENT)
Age: 89
End: 2024-02-19

## 2024-02-20 ENCOUNTER — APPOINTMENT (OUTPATIENT)
Dept: ELECTROPHYSIOLOGY | Facility: CLINIC | Age: 89
End: 2024-02-20
Payer: MEDICARE

## 2024-02-20 PROCEDURE — 93298 REM INTERROG DEV EVAL SCRMS: CPT

## 2024-03-06 ENCOUNTER — NON-APPOINTMENT (OUTPATIENT)
Age: 89
End: 2024-03-06

## 2024-03-25 ENCOUNTER — NON-APPOINTMENT (OUTPATIENT)
Age: 89
End: 2024-03-25

## 2024-03-26 ENCOUNTER — APPOINTMENT (OUTPATIENT)
Dept: ELECTROPHYSIOLOGY | Facility: CLINIC | Age: 89
End: 2024-03-26
Payer: MEDICARE

## 2024-03-26 PROCEDURE — 93298 REM INTERROG DEV EVAL SCRMS: CPT

## 2024-04-16 NOTE — HISTORY OF PRESENT ILLNESS
[FreeTextEntry1] : 89M h/o CAD s/p CABG (1995 [place]) and PCI (most recent PATRIC to RCA 7/2022), ICM (EF 30-35% 12/2022, now 35-40% on TTE 7/31/2023), HFrEF, DM2, HTN, carotid stenosis & HLD, who presents for follow up after hospitalization. He initially presented to ED 7/28/2023, found to have acute CHF, JAMARI, mild bradycardia in setting of new onset atrial flutter- he left AMA the same day. He then returned to the hospital on 7/29/23 with more significant bradycardia. He was admitted 7/30/23 with slow atrial flutter with rates in the 40s and intermittent CHB with narrow escape and frequent pauses up to 20 seconds in duration associated with symptoms of presyncope and syncope. He then underwent typical atrial flutter ablation (CTI line), EPS >HV 65msec, MDT Micrjona AV leadless PPM implant and MDT ILR implant w/ Dr Sanchez on 7/31/2023, previously followed by [facility], presents to establish care 8/2023 with fluid overload need daily Lasix and added spironolactone, last seen 10/2023, interval admitted to [facility] 10/31-11/1/23 for chest pain with elevated Troponin 0.35 repeat cath no significant obstructive disease medically managed, pBNP 2927, repeat Echo LV EF 35-40% with stable moderate AI and off Lasix as was hypotensive in the ER, discharged on Toprol, Entresto and Ranexa, last seen 11/2023 switched metformin to Jardiance for HF/diabetes, reports bilateral legs weakness/soreness, started on Lasix 20mg by PCP, last seen 1/2024 resumed Lasix 20mg once daily repeat Cr. 1.27 presents for follow up.      Prior visit 1/2024:  Patient presents with his daughter for visit, reports feeling well, has intermittent LE edema taking Lasix 20mg about 4x per week as he does not want frequent urination at night time, still active driving and walking with his cane, denies falls/bleeding, has intermittent midsternal chest discomfort reports heart burn but not taking daily PPI.    Prior visit 11/2023:  Patient presents with his daughter for the visit, reports feeling well no recurrence chest pain except feeling lightheaded at times, low SBP 90s at time, lives at home with his wife, denies recent fall or syncope. L-radial access site with forearm bruises but no pain, has +2 radial pulses. Recent ILR report from 9/2023 with false AF detection due to undersensing, remains in sinus rhythm.  He is seeing his PCP next week.  10/17/2023 Patient here for follow up. He is accompanied by his daughter. They were told to come in by PCP for medication adjustment due to hypotension and hyperkalemia. He c/o feeling lightheaded and having low BPs since addition of spironolactone. States leg edema has improved. Denies chest pain, SOB, orthopnea, or syncope. Did have a fall couple weeks ago, denies injury. No event on ILR   Prior visit 8/2023: Today, he is accompanied by his wife, Joleen, and daughter, Destiny. Reports feeling overall well. Reports BL LE edema, worse than usual baseline (prior to hospitalization) but now improving since increasing the frequency of Lasix last week. He was instructed to increase Lasix to daily as of last week, he took it Monday through Friday. Denies chest pain, SOB at rest, FINNEGAN, orthopnea, PND, palpitations, lightheadedness, dizziness, fatigue, syncope, near syncope.

## 2024-04-16 NOTE — CARDIOLOGY SUMMARY
[de-identified] : 8/14/2023: V paced, SR, lateral infarct age undetermined, QTc 486.  [de-identified] : TTE 7/31/2023: LVEF 35-40%, severe pulmonary HTN, Grade II Diastolic dysfunction, mild MR/ AR, dilated aortic root.  10/31/23- Summary:  1. Left ventricular ejection fraction, by visual estimation, is 35 to  40%.  2. Moderately decreased global left ventricular systolic function.  3. Spectral Doppler shows pseudonormal pattern of left ventricular  myocardial filling (Grade II diastolic dysfunction).  4. There is mild left ventricular hypertrophy.  5. Mildly reduced RV systolic function.  6. Mildly enlarged left atrium.  7. Moderately enlarged right atrium.  8. Mild mitral annular calcification.  9. Mild mitral valve regurgitation. 10. Thickening of the anterior and posterior mitral valve leaflets. 11. Mild-moderate tricuspid regurgitation. 12. Moderate aortic regurgitation. 13. Sclerotic aortic valve with normal opening. 14. Dilatation of the ascending aorta and aortic root. 15. Estimated pulmonary artery systolic pressure is 75.8 mmHg assuming a  right atrial pressure of 3 mmHg, which is consistent with severe  pulmonary hypertension. 16. Endocardial visualization was enhanced with intravenous echo contrast.

## 2024-04-16 NOTE — DISCUSSION/SUMMARY
[FreeTextEntry1] : 89M h/o CAD s/p CABG (1995 [place]) and PCI (most recent PATRIC to RCA 7/2022), ICM (EF 30-35% 12/2022, now 35-40% on TTE 7/31/2023), HFrEF, DM2, HTN, carotid stenosis & HLD, who presents for follow up after hospitalization. He initially presented to ED 7/28/2023, found to have acute CHF, JAMARI, mild bradycardia in setting of new onset atrial flutter- he left AMA the same day. He then returned to the hospital on 7/29/23 with more significant bradycardia. He was admitted 7/30/23 with slow atrial flutter with rates in the 40s and intermittent CHB with narrow escape and frequent pauses up to 20 seconds in duration associated with symptoms of presyncope and syncope. He then underwent typical atrial flutter ablation (CTI line), EPS >HV 65msec, MDT Micra AV leadless PPM implant and MDT ILR implant w/ Dr Sanchez on 7/31/2023, previously followed by [facility], presents to establish care 8/2023 with fluid overload need daily Lasix and added spironolactone, last seen 10/2023, interval admitted to [facility] 10/31-11/1/23 for chest pain with elevated Troponin 0.35 repeat cath no significant obstructive disease medically managed, pBNP 2927, repeat Echo LV EF 35-40% with stable moderate AI and off Lasix as was hypotensive in the ER, discharged on Toprol, Entresto and Ranexa, last seen 11/2023 switched metformin to Jardiance for HF/diabetes, reports bilateral legs weakness/soreness, started on Lasix 20mg by PCP, presents for follow up.   Overall stable, has 1+ bilateral legs edema but no pulmonary edema, advised need to use Lasix 20mg once daily, continue Jardiance, prior NSTEMI without obstructive disease suspect microvascular disease vs. MINOCA, baseline low SBP continue Ranexa as antianginal, also need daily PPI for acid reflux.    1. CAD/ICM, HFrEF -change to daily Lasix 20mg to be taken in the AM -off spironolactone due to low BP -continue Jardiance 25mg, need repeat BMP and pBNP to be drawn at home next week.  -continue metoprolol succinate at bedtime and low dose Entresto hold if SBP <90  2. CAD/MI: s/p repeat cath (11/2023) no new obstructive disease, pending official report, EF 35-40%, Moderate to severe MR, Moderate AR. EF 35 to 40% 11/2023 - continue clopidogrel, atorvastatin and Ranexa  3. pAtrial flutter with underlying AVB with periods of asystole - s/p flutter ablation, micra leadless PPM implant and ILR insertion by Dr. Sanchez on 7/31/2023:  - continue Metoprolol and Eliquis.  - EP follow up with remote monitor check next week  4. DM2 -A1c 6.8 in 7/2023, off metformin switched to Jardiance 25mg   Follow up in 4 months.

## 2024-04-18 ENCOUNTER — APPOINTMENT (OUTPATIENT)
Dept: CARDIOLOGY | Facility: CLINIC | Age: 89
End: 2024-04-18
Payer: MEDICARE

## 2024-04-18 ENCOUNTER — NON-APPOINTMENT (OUTPATIENT)
Age: 89
End: 2024-04-18

## 2024-04-18 VITALS
DIASTOLIC BLOOD PRESSURE: 80 MMHG | BODY MASS INDEX: 25.12 KG/M2 | SYSTOLIC BLOOD PRESSURE: 126 MMHG | HEIGHT: 62.5 IN | HEART RATE: 63 BPM | WEIGHT: 140 LBS | OXYGEN SATURATION: 99 %

## 2024-04-18 DIAGNOSIS — I25.10 ATHEROSCLEROTIC HEART DISEASE OF NATIVE CORONARY ARTERY W/OUT ANGINA PECTORIS: ICD-10-CM

## 2024-04-18 DIAGNOSIS — I48.92 UNSPECIFIED ATRIAL FLUTTER: ICD-10-CM

## 2024-04-18 DIAGNOSIS — E11.9 TYPE 2 DIABETES MELLITUS W/OUT COMPLICATIONS: ICD-10-CM

## 2024-04-18 DIAGNOSIS — I50.20 UNSPECIFIED SYSTOLIC (CONGESTIVE) HEART FAILURE: ICD-10-CM

## 2024-04-18 DIAGNOSIS — Z95.0 PRESENCE OF CARDIAC PACEMAKER: ICD-10-CM

## 2024-04-18 PROCEDURE — 93000 ELECTROCARDIOGRAM COMPLETE: CPT

## 2024-04-18 PROCEDURE — G2211 COMPLEX E/M VISIT ADD ON: CPT

## 2024-04-18 PROCEDURE — 99214 OFFICE O/P EST MOD 30 MIN: CPT

## 2024-04-18 RX ORDER — FUROSEMIDE 40 MG/1
40 TABLET ORAL
Qty: 90 | Refills: 3 | Status: ACTIVE | COMMUNITY
Start: 1900-01-01 | End: 1900-01-01

## 2024-04-18 RX ORDER — RANOLAZINE 500 MG/1
500 TABLET, EXTENDED RELEASE ORAL
Qty: 180 | Refills: 3 | Status: ACTIVE | COMMUNITY
Start: 1900-01-01 | End: 1900-01-01

## 2024-04-18 RX ORDER — APIXABAN 2.5 MG/1
2.5 TABLET, FILM COATED ORAL
Qty: 180 | Refills: 3 | Status: ACTIVE | COMMUNITY
Start: 1900-01-01 | End: 1900-01-01

## 2024-04-18 RX ORDER — SACUBITRIL AND VALSARTAN 24; 26 MG/1; MG/1
24-26 TABLET, FILM COATED ORAL TWICE DAILY
Qty: 180 | Refills: 3 | Status: ACTIVE | COMMUNITY
Start: 1900-01-01 | End: 1900-01-01

## 2024-04-18 RX ORDER — ASPIRIN 81 MG/1
81 TABLET ORAL
Qty: 90 | Refills: 3 | Status: ACTIVE | COMMUNITY
Start: 2023-12-13 | End: 1900-01-01

## 2024-04-18 RX ORDER — EMPAGLIFLOZIN 25 MG/1
25 TABLET, FILM COATED ORAL
Qty: 90 | Refills: 3 | Status: ACTIVE | COMMUNITY
Start: 2023-11-03 | End: 1900-01-01

## 2024-04-18 RX ORDER — ATORVASTATIN CALCIUM 40 MG/1
40 TABLET, FILM COATED ORAL
Qty: 90 | Refills: 3 | Status: ACTIVE | COMMUNITY
Start: 1900-01-01 | End: 1900-01-01

## 2024-04-28 ENCOUNTER — NON-APPOINTMENT (OUTPATIENT)
Age: 89
End: 2024-04-28

## 2024-04-29 ENCOUNTER — APPOINTMENT (OUTPATIENT)
Dept: ELECTROPHYSIOLOGY | Facility: CLINIC | Age: 89
End: 2024-04-29
Payer: MEDICARE

## 2024-04-29 PROCEDURE — 93298 REM INTERROG DEV EVAL SCRMS: CPT

## 2024-04-30 ENCOUNTER — OFFICE (OUTPATIENT)
Dept: URBAN - METROPOLITAN AREA CLINIC 115 | Facility: CLINIC | Age: 89
Setting detail: OPHTHALMOLOGY
End: 2024-04-30
Payer: MEDICARE

## 2024-04-30 DIAGNOSIS — H40.1113: ICD-10-CM

## 2024-04-30 PROCEDURE — 92133 CPTRZD OPH DX IMG PST SGM ON: CPT | Performed by: OPHTHALMOLOGY

## 2024-04-30 PROCEDURE — 99213 OFFICE O/P EST LOW 20 MIN: CPT | Performed by: OPHTHALMOLOGY

## 2024-04-30 PROCEDURE — 92083 EXTENDED VISUAL FIELD XM: CPT | Performed by: OPHTHALMOLOGY

## 2024-04-30 ASSESSMENT — LID EXAM ASSESSMENTS
OS_BLEPHARITIS: LUL 1+
OD_BLEPHARITIS: RUL 1+

## 2024-05-06 NOTE — H&P ADULT - LOCATION OF DISCUSSION
He had a colonoscopy and polypectomy on 10/18/2021 and had a hyperplastic polyp removed. He needs a screening colonoscopy in 2031.      Face to face

## 2024-05-30 ENCOUNTER — NON-APPOINTMENT (OUTPATIENT)
Age: 89
End: 2024-05-30

## 2024-05-31 ENCOUNTER — APPOINTMENT (OUTPATIENT)
Dept: ELECTROPHYSIOLOGY | Facility: CLINIC | Age: 89
End: 2024-05-31
Payer: MEDICARE

## 2024-05-31 PROCEDURE — 93298 REM INTERROG DEV EVAL SCRMS: CPT

## 2024-07-03 ENCOUNTER — APPOINTMENT (OUTPATIENT)
Dept: ELECTROPHYSIOLOGY | Facility: CLINIC | Age: 89
End: 2024-07-03
Payer: MEDICARE

## 2024-07-03 ENCOUNTER — NON-APPOINTMENT (OUTPATIENT)
Age: 89
End: 2024-07-03

## 2024-07-03 PROCEDURE — 93298 REM INTERROG DEV EVAL SCRMS: CPT

## 2024-07-23 ENCOUNTER — NON-APPOINTMENT (OUTPATIENT)
Age: 89
End: 2024-07-23

## 2024-08-06 ENCOUNTER — NON-APPOINTMENT (OUTPATIENT)
Age: 89
End: 2024-08-06

## 2024-08-07 ENCOUNTER — APPOINTMENT (OUTPATIENT)
Dept: ELECTROPHYSIOLOGY | Facility: CLINIC | Age: 89
End: 2024-08-07

## 2024-08-07 PROCEDURE — 93298 REM INTERROG DEV EVAL SCRMS: CPT

## 2024-08-20 ENCOUNTER — OFFICE (OUTPATIENT)
Dept: URBAN - METROPOLITAN AREA CLINIC 115 | Facility: CLINIC | Age: 89
Setting detail: OPHTHALMOLOGY
End: 2024-08-20
Payer: MEDICARE

## 2024-08-20 DIAGNOSIS — H01.004: ICD-10-CM

## 2024-08-20 DIAGNOSIS — H40.1113: ICD-10-CM

## 2024-08-20 DIAGNOSIS — H01.001: ICD-10-CM

## 2024-08-20 DIAGNOSIS — H47.291: ICD-10-CM

## 2024-08-20 DIAGNOSIS — Z96.1: ICD-10-CM

## 2024-08-20 DIAGNOSIS — H40.1122: ICD-10-CM

## 2024-08-20 PROCEDURE — 92133 CPTRZD OPH DX IMG PST SGM ON: CPT | Performed by: OPHTHALMOLOGY

## 2024-08-20 PROCEDURE — 92083 EXTENDED VISUAL FIELD XM: CPT | Performed by: OPHTHALMOLOGY

## 2024-08-20 PROCEDURE — 99213 OFFICE O/P EST LOW 20 MIN: CPT | Performed by: OPHTHALMOLOGY

## 2024-08-20 ASSESSMENT — CONFRONTATIONAL VISUAL FIELD TEST (CVF)
OS_FINDINGS: FULL
OD_FINDINGS: FULL

## 2024-08-20 ASSESSMENT — LID EXAM ASSESSMENTS
OS_BLEPHARITIS: LUL 1+
OD_BLEPHARITIS: RUL 1+

## 2024-08-27 ENCOUNTER — NON-APPOINTMENT (OUTPATIENT)
Age: 89
End: 2024-08-27

## 2024-08-27 ENCOUNTER — APPOINTMENT (OUTPATIENT)
Dept: CARDIOLOGY | Facility: CLINIC | Age: 89
End: 2024-08-27
Payer: MEDICARE

## 2024-08-27 VITALS
SYSTOLIC BLOOD PRESSURE: 100 MMHG | HEIGHT: 62.5 IN | BODY MASS INDEX: 5.56 KG/M2 | DIASTOLIC BLOOD PRESSURE: 60 MMHG | OXYGEN SATURATION: 98 % | WEIGHT: 31 LBS | HEART RATE: 62 BPM

## 2024-08-27 DIAGNOSIS — I50.20 UNSPECIFIED SYSTOLIC (CONGESTIVE) HEART FAILURE: ICD-10-CM

## 2024-08-27 DIAGNOSIS — I25.10 ATHEROSCLEROTIC HEART DISEASE OF NATIVE CORONARY ARTERY W/OUT ANGINA PECTORIS: ICD-10-CM

## 2024-08-27 DIAGNOSIS — Z95.0 PRESENCE OF CARDIAC PACEMAKER: ICD-10-CM

## 2024-08-27 PROCEDURE — G2211 COMPLEX E/M VISIT ADD ON: CPT

## 2024-08-27 PROCEDURE — 93000 ELECTROCARDIOGRAM COMPLETE: CPT

## 2024-08-27 PROCEDURE — 93306 TTE W/DOPPLER COMPLETE: CPT

## 2024-08-27 PROCEDURE — 99214 OFFICE O/P EST MOD 30 MIN: CPT

## 2024-08-27 NOTE — CARDIOLOGY SUMMARY
[de-identified] : 8/14/2023: V paced, SR, lateral infarct age undetermined, QTc 486.  4/18/24: V paced 56, underlying Afib,  8/27/24: V paced 52, underlying rhythm indeterminate,  [de-identified] : 8/27/24- LV EF 35-40%, PASP 39, mild-mod AI  TTE 7/31/2023: LVEF 35-40%, severe pulmonary HTN, Grade II Diastolic dysfunction, mild MR/ AR, dilated aortic root.  10/31/23- Summary:  1. Left ventricular ejection fraction, by visual estimation, is 35 to  40%.  2. Moderately decreased global left ventricular systolic function.  3. Spectral Doppler shows pseudonormal pattern of left ventricular  myocardial filling (Grade II diastolic dysfunction).  4. There is mild left ventricular hypertrophy.  5. Mildly reduced RV systolic function.  6. Mildly enlarged left atrium.  7. Moderately enlarged right atrium.  8. Mild mitral annular calcification.  9. Mild mitral valve regurgitation. 10. Thickening of the anterior and posterior mitral valve leaflets. 11. Mild-moderate tricuspid regurgitation. 12. Moderate aortic regurgitation. 13. Sclerotic aortic valve with normal opening. 14. Dilatation of the ascending aorta and aortic root. 15. Estimated pulmonary artery systolic pressure is 75.8 mmHg assuming a  right atrial pressure of 3 mmHg, which is consistent with severe  pulmonary hypertension. 16. Endocardial visualization was enhanced with intravenous echo contrast.

## 2024-08-27 NOTE — HISTORY OF PRESENT ILLNESS
[FreeTextEntry1] : 89M h/o CAD s/p CABG (1995 [place]) and PCI (most recent PATRIC to RCA 7/2022), ICM (EF 30-35% 12/2022, now 35-40% on TTE 7/31/2023), HFrEF, DM2, HTN, carotid stenosis & HLD, who presents for follow up after hospitalization. He initially presented to ED 7/28/2023, found to have acute CHF, JAMARI, mild bradycardia in setting of new onset atrial flutter- he left AMA the same day. He then returned to the hospital on 7/29/23 with more significant bradycardia. He was admitted 7/30/23 with slow atrial flutter with rates in the 40s and intermittent CHB with narrow escape and frequent pauses up to 20 seconds in duration associated with symptoms of presyncope and syncope. He then underwent typical atrial flutter ablation (CTI line), EPS >HV 65msec, MDT Micra AV leadless PPM implant and MDT ILR implant w/ Dr Sanchez on 7/31/2023, previously followed by [place], presents to establish care 8/2023 with fluid overload need daily Lasix and added spironolactone, last seen 10/2023, interval admitted to [facility] 10/31-11/1/23 for chest pain with elevated Troponin 0.35 repeat cath no significant obstructive disease medically managed, pBNP 2927, repeat Echo LV EF 35-40% with stable moderate AI and off Lasix as was hypotensive in the ER, discharged on Toprol, Entresto and Ranexa, last seen 11/2023 switched metformin to Jardiance for HF/diabetes, reports bilateral legs weakness/soreness, started on Lasix 20mg by PCP, last seen 1/2024 resumed Lasix 20mg once daily repeat Cr. 1.27 since increased on BID dosing, last seen 4/2024 with nocturnal urination changed to Lasix AM time, presents for follow up and repeat Echo with LV EF 41%.   Patient presents with his daughter for the visit.  Reports been bother by chronic R-shin pain worst at rest and using Asper cream, had Doppler of the leg last year at [place]. Denies bleeding or fall with Eliquis use.  Cr. 1.47 with GFR 48 Monthly ILR interrogation has been in sinus, last PPM check in 12/2023 with pacing 32%   Prior visit 4/2024:  Patient presents with his daughter for the visit, reports feeling well no cardiac complains, active with walking without fall or bleeding, wants to have a disability parking permit, has bilateral hands numbness given gabapentin 300mg by his PCP reports no relief and stopped. Reports frequent urination at night with PM dose of Lasix, no recent LE edema.  Seen by EP in 12/2023: Micra AV PPM interrogation reveals increased  burden of AM- 31.9% with  only 17.2%. AV conduction mode switch on.   Prior visit 1/2024:  Patient presents with his daughter for visit, reports feeling well, has intermittent LE edema taking Lasix 20mg about 4x per week as he does not want frequent urination at night time, still active driving and walking with his cane, denies falls/bleeding, has intermittent midsternal chest discomfort reports heart burn but not taking daily PPI.    Prior visit 11/2023:  Patient presents with his daughter for the visit, reports feeling well no recurrence chest pain except feeling lightheaded at times, low SBP 90s at time, lives at home with his wife, denies recent fall or syncope. L-radial access site with forearm bruises but no pain, has +2 radial pulses. Recent ILR report from 9/2023 with false AF detection due to undersensing, remains in sinus rhythm.  He is seeing his PCP next week.  10/17/2023 Patient here for follow up. He is accompanied by his daughter. They were told to come in by PCP for medication adjustment due to hypotension and hyperkalemia. He c/o feeling lightheaded and having low BPs since addition of spironolactone. States leg edema has improved. Denies chest pain, SOB, orthopnea, or syncope. Did have a fall couple weeks ago, denies injury. No event on ILR   Prior visit 8/2023: Today, he is accompanied by his wife, Joleen, and daughter, Destiny. Reports feeling overall well. Reports BL LE edema, worse than usual baseline (prior to hospitalization) but now improving since increasing the frequency of Lasix last week. He was instructed to increase Lasix to daily as of last week, he took it Monday through Friday. Denies chest pain, SOB at rest, FINNEGAN, orthopnea, PND, palpitations, lightheadedness, dizziness, fatigue, syncope, near syncope.

## 2024-08-27 NOTE — CARDIOLOGY SUMMARY
[de-identified] : 8/14/2023: V paced, SR, lateral infarct age undetermined, QTc 486.  4/18/24: V paced 56, underlying Afib,  8/27/24: V paced 52, underlying rhythm indeterminate,  [de-identified] : 8/27/24- LV EF 35-40%, PASP 39, mild-mod AI  TTE 7/31/2023: LVEF 35-40%, severe pulmonary HTN, Grade II Diastolic dysfunction, mild MR/ AR, dilated aortic root.  10/31/23- Summary:  1. Left ventricular ejection fraction, by visual estimation, is 35 to  40%.  2. Moderately decreased global left ventricular systolic function.  3. Spectral Doppler shows pseudonormal pattern of left ventricular  myocardial filling (Grade II diastolic dysfunction).  4. There is mild left ventricular hypertrophy.  5. Mildly reduced RV systolic function.  6. Mildly enlarged left atrium.  7. Moderately enlarged right atrium.  8. Mild mitral annular calcification.  9. Mild mitral valve regurgitation. 10. Thickening of the anterior and posterior mitral valve leaflets. 11. Mild-moderate tricuspid regurgitation. 12. Moderate aortic regurgitation. 13. Sclerotic aortic valve with normal opening. 14. Dilatation of the ascending aorta and aortic root. 15. Estimated pulmonary artery systolic pressure is 75.8 mmHg assuming a  right atrial pressure of 3 mmHg, which is consistent with severe  pulmonary hypertension. 16. Endocardial visualization was enhanced with intravenous echo contrast.

## 2024-08-27 NOTE — DISCUSSION/SUMMARY
[FreeTextEntry1] : 89M h/o CAD s/p CABG (1995 [place]) and PCI (most recent PATRIC to RCA 7/2022), ICM (EF 30-35% 12/2022, now 35-40% on TTE 7/31/2023), HFrEF, DM2, HTN, carotid stenosis & HLD, who presents for follow up after hospitalization. He initially presented to ED 7/28/2023, found to have acute CHF, JAMARI, mild bradycardia in setting of new onset atrial flutter- he left AMA the same day. He then returned to the hospital on 7/29/23 with more significant bradycardia. He was admitted 7/30/23 with slow atrial flutter with rates in the 40s and intermittent CHB with narrow escape and frequent pauses up to 20 seconds in duration associated with symptoms of presyncope and syncope. He then underwent typical atrial flutter ablation (CTI line), EPS >HV 65msec, MDT Micrjona AV leadless PPM implant and MDT ILR implant w/ Dr Sanchez on 7/31/2023, previously followed by [facility], presents to establish care 8/2023 with fluid overload need daily Lasix and added spironolactone, last seen 10/2023, interval admitted to [facility] 10/31-11/1/23 for chest pain with elevated Troponin 0.35 repeat cath no significant obstructive disease medically managed, pBNP 2927, repeat Echo LV EF 35-40% with stable moderate AI and off Lasix as was hypotensive in the ER, discharged on Toprol, Entresto and Ranexa, last seen 11/2023 switched metformin to Jardiance for HF/diabetes, reports bilateral legs weakness/soreness, started on Lasix 20mg by PCP, last seen 1/2024 resumed Lasix 20mg once daily repeat Cr. 1.27 since increased on BID dosing, last seen 4/2024 with nocturnal urination changed to Lasix AM time, presents for follow up and repeat Echo with LV EF 41%.   Overall stable, resolution of LE edema with Lasix 20mg BID use continue Jardiance, prior NSTEMI without obstructive disease suspect microvascular disease vs. MINOCA, he wants to trial off Ranexa as no anginal symptoms. Unclear etiology of chronic R-shin pain with 2+ pedal pulses, advised warm compress.    1. CAD/ICM, HFrEF -continue Lasix 20mg BID in AM -off spironolactone due to prior low BP -continue Jardiance 25mg -continue metoprolol succinate at bedtime and low dose Entresto hold if SBP <90  2. CAD/MI: s/p repeat cath (11/2023 with LIMA-LAD with distal anastomosis patent stent and RCA patent stent, SVG-LCx chronic ) EF 35-40%, mild-mod. AI and MR improved from prior Echo - continue ASA 81mg, atorvastatin and monitor off Ranexa - stable LV EF without high Vpacing burden  3. pAfib, flutter with underlying AVB with periods of asystole - s/p flutter ablation, micra leadless PPM implant and ILR insertion by Dr. Sanchez on 7/31/2023:  - continue Metoprolol and Eliquis reduced dose.  - needs EP follow up if increasing Vpacing burden or further drop in LV EF may need CRT but unclear at his age  4. DM2 -A1c 6.8 in 7/2023, off metformin switched to Jardiance 25mg   Follow up in 4 months.   [EKG obtained to assist in diagnosis and management of assessed problem(s)] : EKG obtained to assist in diagnosis and management of assessed problem(s)

## 2024-08-27 NOTE — DISCUSSION/SUMMARY
[FreeTextEntry1] : 89M h/o CAD s/p CABG (1995 [place]) and PCI (most recent PATRIC to RCA 7/2022), ICM (EF 30-35% 12/2022, now 35-40% on TTE 7/31/2023), HFrEF, DM2, HTN, carotid stenosis & HLD, who presents for follow up after hospitalization. He initially presented to ED 7/28/2023, found to have acute CHF, JAMARI, mild bradycardia in setting of new onset atrial flutter- he left AMA the same day. He then returned to the hospital on 7/29/23 with more significant bradycardia. He was admitted 7/30/23 with slow atrial flutter with rates in the 40s and intermittent CHB with narrow escape and frequent pauses up to 20 seconds in duration associated with symptoms of presyncope and syncope. He then underwent typical atrial flutter ablation (CTI line), EPS >HV 65msec, MDT Micrjona AV leadless PPM implant and MDT ILR implant w/ Dr Sanchez on 7/31/2023, previously followed by [place] [facility], presents to establish care 8/2023 with fluid overload need daily Lasix and added spironolactone, last seen 10/2023, interval admitted to [facility] 10/31-11/1/23 for chest pain with elevated Troponin 0.35 repeat cath no significant obstructive disease medically managed, pBNP 2927, repeat Echo LV EF 35-40% with stable moderate AI and off Lasix as was hypotensive in the ER, discharged on Toprol, Entresto and Ranexa, last seen 11/2023 switched metformin to Jardiance for HF/diabetes, reports bilateral legs weakness/soreness, started on Lasix 20mg by PCP, last seen 1/2024 resumed Lasix 20mg once daily repeat Cr. 1.27 since increased on BID dosing, last seen 4/2024 with nocturnal urination changed to Lasix AM time, presents for follow up and repeat Echo with LV EF 41%.   Overall stable, resolution of LE edema with Lasix 20mg BID use continue Jardiance, prior NSTEMI without obstructive disease suspect microvascular disease vs. MINOCA, he wants to trial off Ranexa as no anginal symptoms. Unclear etiology of chronic R-shin pain with 2+ pedal pulses, advised warm compress.    1. CAD/ICM, HFrEF -continue Lasix 20mg BID in AM -off spironolactone due to prior low BP -continue Jardiance 25mg -continue metoprolol succinate at bedtime and low dose Entresto hold if SBP <90  2. CAD/MI: s/p repeat cath (11/2023 with LIMA-LAD with distal anastomosis patent stent and RCA patent stent, SVG-LCx chronic ) EF 35-40%, mild-mod. AI and MR improved from prior Echo - continue ASA 81mg, atorvastatin and monitor off Ranexa - stable LV EF without high Vpacing burden  3. pAfib, flutter with underlying AVB with periods of asystole - s/p flutter ablation, micra leadless PPM implant and ILR insertion by Dr. Sanchez on 7/31/2023:  - continue Metoprolol and Eliquis reduced dose.  - needs EP follow up if increasing Vpacing burden or further drop in LV EF may need CRT but unclear at his age  4. DM2 -A1c 6.8 in 7/2023, off metformin switched to Jardiance 25mg   Follow up in 4 months.   [EKG obtained to assist in diagnosis and management of assessed problem(s)] : EKG obtained to assist in diagnosis and management of assessed problem(s)

## 2024-08-27 NOTE — HISTORY OF PRESENT ILLNESS
[FreeTextEntry1] : 89M h/o CAD s/p CABG (1995 [place]) and PCI (most recent PATRIC to RCA 7/2022), ICM (EF 30-35% 12/2022, now 35-40% on TTE 7/31/2023), HFrEF, DM2, HTN, carotid stenosis & HLD, who presents for follow up after hospitalization. He initially presented to ED 7/28/2023, found to have acute CHF, JAMARI, mild bradycardia in setting of new onset atrial flutter- he left AMA the same day. He then returned to the hospital on 7/29/23 with more significant bradycardia. He was admitted 7/30/23 with slow atrial flutter with rates in the 40s and intermittent CHB with narrow escape and frequent pauses up to 20 seconds in duration associated with symptoms of presyncope and syncope. He then underwent typical atrial flutter ablation (CTI line), EPS >HV 65msec, MDT Micra AV leadless PPM implant and MDT ILR implant w/ Dr Sanchez on 7/31/2023, previously followed by [place], presents to establish care 8/2023 with fluid overload need daily Lasix and added spironolactone, last seen 10/2023, interval admitted to [facility] 10/31-11/1/23 for chest pain with elevated Troponin 0.35 repeat cath no significant obstructive disease medically managed, pBNP 2927, repeat Echo LV EF 35-40% with stable moderate AI and off Lasix as was hypotensive in the ER, discharged on Toprol, Entresto and Ranexa, last seen 11/2023 switched metformin to Jardiance for HF/diabetes, reports bilateral legs weakness/soreness, started on Lasix 20mg by PCP, last seen 1/2024 resumed Lasix 20mg once daily repeat Cr. 1.27 since increased on BID dosing, last seen 4/2024 with nocturnal urination changed to Lasix AM time, presents for follow up and repeat Echo with LV EF 41%.   Patient presents with his daughter for the visit.  Reports been bother by chronic R-shin pain worst at rest and using Asper cream, had Doppler of the leg last year at [place]. Denies bleeding or fall with Eliquis use.  Cr. 1.47 with GFR 48 Monthly ILR interrogation has been in sinus, last PPM check in 12/2023 with pacing 32%   Prior visit 4/2024:  Patient presents with his daughter for the visit, reports feeling well no cardiac complains, active with walking without fall or bleeding, wants to have a disability parking permit, has bilateral hands numbness given gabapentin 300mg by his PCP reports no relief and stopped. Reports frequent urination at night with PM dose of Lasix, no recent LE edema.  Seen by EP in 12/2023: Micra AV PPM interrogation reveals increased  burden of AM- 31.9% with  only 17.2%. AV conduction mode switch on.   Prior visit 1/2024:  Patient presents with his daughter for visit, reports feeling well, has intermittent LE edema taking Lasix 20mg about 4x per week as he does not want frequent urination at night time, still active driving and walking with his cane, denies falls/bleeding, has intermittent midsternal chest discomfort reports heart burn but not taking daily PPI.    Prior visit 11/2023:  Patient presents with his daughter for the visit, reports feeling well no recurrence chest pain except feeling lightheaded at times, low SBP 90s at time, lives at home with his wife, denies recent fall or syncope. L-radial access site with forearm bruises but no pain, has +2 radial pulses. Recent ILR report from 9/2023 with false AF detection due to undersensing, remains in sinus rhythm.  He is seeing his PCP next week.  10/17/2023 Patient here for follow up. He is accompanied by his daughter. They were told to come in by PCP for medication adjustment due to hypotension and hyperkalemia. He c/o feeling lightheaded and having low BPs since addition of spironolactone. States leg edema has improved. Denies chest pain, SOB, orthopnea, or syncope. Did have a fall couple weeks ago, denies injury. No event on ILR   Prior visit 8/2023: Today, he is accompanied by his wife, Joleen, and daughter, Destiny. Reports feeling overall well. Reports BL LE edema, worse than usual baseline (prior to hospitalization) but now improving since increasing the frequency of Lasix last week. He was instructed to increase Lasix to daily as of last week, he took it Monday through Friday. Denies chest pain, SOB at rest, FINNEGAN, orthopnea, PND, palpitations, lightheadedness, dizziness, fatigue, syncope, near syncope.

## 2024-09-02 LAB — HBA1C MFR BLD HPLC: 6.7

## 2024-09-04 ENCOUNTER — NON-APPOINTMENT (OUTPATIENT)
Age: 89
End: 2024-09-04

## 2024-09-10 ENCOUNTER — NON-APPOINTMENT (OUTPATIENT)
Age: 89
End: 2024-09-10

## 2024-09-11 ENCOUNTER — APPOINTMENT (OUTPATIENT)
Dept: ELECTROPHYSIOLOGY | Facility: CLINIC | Age: 89
End: 2024-09-11
Payer: MEDICARE

## 2024-09-11 PROCEDURE — 93298 REM INTERROG DEV EVAL SCRMS: CPT

## 2024-09-12 ENCOUNTER — NON-APPOINTMENT (OUTPATIENT)
Age: 89
End: 2024-09-12

## 2024-09-16 ENCOUNTER — RX ONLY (RX ONLY)
Age: 89
End: 2024-09-16

## 2024-09-16 ENCOUNTER — OFFICE (OUTPATIENT)
Dept: URBAN - METROPOLITAN AREA CLINIC 115 | Facility: CLINIC | Age: 89
Setting detail: OPHTHALMOLOGY
End: 2024-09-16
Payer: MEDICARE

## 2024-09-16 DIAGNOSIS — H40.1113: ICD-10-CM

## 2024-09-16 DIAGNOSIS — H01.004: ICD-10-CM

## 2024-09-16 DIAGNOSIS — H01.001: ICD-10-CM

## 2024-09-16 DIAGNOSIS — Z96.1: ICD-10-CM

## 2024-09-16 DIAGNOSIS — H40.1122: ICD-10-CM

## 2024-09-16 DIAGNOSIS — H47.291: ICD-10-CM

## 2024-09-16 PROCEDURE — 99212 OFFICE O/P EST SF 10 MIN: CPT | Performed by: OPHTHALMOLOGY

## 2024-09-16 ASSESSMENT — LID EXAM ASSESSMENTS
OS_BLEPHARITIS: LUL 1+
OD_BLEPHARITIS: RUL 1+

## 2024-09-16 ASSESSMENT — CONFRONTATIONAL VISUAL FIELD TEST (CVF)
OS_FINDINGS: FULL
OD_FINDINGS: FULL

## 2024-10-12 ENCOUNTER — NON-APPOINTMENT (OUTPATIENT)
Age: 89
End: 2024-10-12

## 2024-10-16 ENCOUNTER — APPOINTMENT (OUTPATIENT)
Dept: ELECTROPHYSIOLOGY | Facility: CLINIC | Age: 89
End: 2024-10-16
Payer: MEDICARE

## 2024-10-16 ENCOUNTER — NON-APPOINTMENT (OUTPATIENT)
Age: 89
End: 2024-10-16

## 2024-10-16 PROCEDURE — 93298 REM INTERROG DEV EVAL SCRMS: CPT

## 2024-10-28 ENCOUNTER — APPOINTMENT (OUTPATIENT)
Dept: OTOLARYNGOLOGY | Facility: CLINIC | Age: 89
End: 2024-10-28

## 2024-11-11 ENCOUNTER — OFFICE (OUTPATIENT)
Dept: URBAN - METROPOLITAN AREA CLINIC 115 | Facility: CLINIC | Age: 89
Setting detail: OPHTHALMOLOGY
End: 2024-11-11
Payer: MEDICARE

## 2024-11-11 DIAGNOSIS — H01.001: ICD-10-CM

## 2024-11-11 DIAGNOSIS — H47.291: ICD-10-CM

## 2024-11-11 DIAGNOSIS — H40.1122: ICD-10-CM

## 2024-11-11 DIAGNOSIS — H11.32: ICD-10-CM

## 2024-11-11 DIAGNOSIS — H01.004: ICD-10-CM

## 2024-11-11 DIAGNOSIS — H40.031: ICD-10-CM

## 2024-11-11 DIAGNOSIS — H40.1113: ICD-10-CM

## 2024-11-11 DIAGNOSIS — Z96.1: ICD-10-CM

## 2024-11-11 PROCEDURE — 99212 OFFICE O/P EST SF 10 MIN: CPT | Performed by: OPHTHALMOLOGY

## 2024-11-11 ASSESSMENT — REFRACTION_MANIFEST
OS_CYLINDER: -1.00
OS_AXIS: 120
OD_SPHERE: PL
OD_VA1: 20/FC
OS_VA1: 20/25
OD_SPHERE: BAL
OS_VA1: 20/30+
OS_CYLINDER: -0.75
OS_SPHERE: -0.75
OS_AXIS: 120
OS_SPHERE: PL

## 2024-11-11 ASSESSMENT — REFRACTION_AUTOREFRACTION
OD_CYLINDER: -2.75
OD_AXIS: 157
OS_SPHERE: -0.25
OS_CYLINDER: -0.75
OS_AXIS: 113
OD_SPHERE: -2.50

## 2024-11-11 ASSESSMENT — REFRACTION_CURRENTRX
OD_SPHERE: +2.75
OD_AXIS: 054
OD_OVR_VA: 20/
OS_OVR_VA: 20/
OS_SPHERE: -0.50
OS_CYLINDER: -0.75
OD_OVR_VA: 20/
OD_OVR_VA: 20/
OD_VPRISM_DIRECTION: SV
OS_SPHERE: +2.75
OD_SPHERE: PLANO
OD_SPHERE: +2.75
OS_VPRISM_DIRECTION: SV
OS_AXIS: 058
OS_SPHERE: +2.75
OD_CYLINDER: -0.25
OS_VPRISM_DIRECTION: SV

## 2024-11-11 ASSESSMENT — CONFRONTATIONAL VISUAL FIELD TEST (CVF)
OD_FINDINGS: FULL
OS_FINDINGS: FULL

## 2024-11-11 ASSESSMENT — LID EXAM ASSESSMENTS
OD_BLEPHARITIS: RUL 1+
OS_BLEPHARITIS: LUL 1+

## 2024-11-11 ASSESSMENT — PACHYMETRY
OS_CT_CORRECTION: 0
OD_CT_UM: 550
OD_CT_CORRECTION: -1
OS_CT_UM: 540

## 2024-11-11 ASSESSMENT — VISUAL ACUITY
OD_BCVA: 20/30
OS_BCVA: 20/HM

## 2024-11-20 ENCOUNTER — APPOINTMENT (OUTPATIENT)
Dept: ELECTROPHYSIOLOGY | Facility: CLINIC | Age: 89
End: 2024-11-20
Payer: MEDICARE

## 2024-11-20 ENCOUNTER — NON-APPOINTMENT (OUTPATIENT)
Age: 89
End: 2024-11-20

## 2024-11-20 PROCEDURE — 93298 REM INTERROG DEV EVAL SCRMS: CPT

## 2024-12-09 ENCOUNTER — OFFICE (OUTPATIENT)
Dept: URBAN - METROPOLITAN AREA CLINIC 115 | Facility: CLINIC | Age: 89
Setting detail: OPHTHALMOLOGY
End: 2024-12-09
Payer: MEDICARE

## 2024-12-09 DIAGNOSIS — H40.031: ICD-10-CM

## 2024-12-09 DIAGNOSIS — H01.004: ICD-10-CM

## 2024-12-09 DIAGNOSIS — H01.001: ICD-10-CM

## 2024-12-09 DIAGNOSIS — H11.32: ICD-10-CM

## 2024-12-09 DIAGNOSIS — H40.1122: ICD-10-CM

## 2024-12-09 DIAGNOSIS — H47.291: ICD-10-CM

## 2024-12-09 DIAGNOSIS — H40.1113: ICD-10-CM

## 2024-12-09 DIAGNOSIS — Z96.1: ICD-10-CM

## 2024-12-09 PROCEDURE — 99212 OFFICE O/P EST SF 10 MIN: CPT | Performed by: OPHTHALMOLOGY

## 2024-12-09 PROCEDURE — 92133 CPTRZD OPH DX IMG PST SGM ON: CPT | Performed by: OPHTHALMOLOGY

## 2024-12-09 PROCEDURE — 92083 EXTENDED VISUAL FIELD XM: CPT | Performed by: OPHTHALMOLOGY

## 2024-12-09 ASSESSMENT — REFRACTION_MANIFEST
OS_SPHERE: -0.75
OD_VA1: 20/FC
OS_AXIS: 120
OS_CYLINDER: -1.00
OD_SPHERE: PL
OS_CYLINDER: -0.75
OS_VA1: 20/30+
OS_VA1: 20/25
OD_SPHERE: BAL
OS_SPHERE: PL
OS_AXIS: 120

## 2024-12-09 ASSESSMENT — TONOMETRY
OD_IOP_MMHG: 11
OD_IOP_MMHG: 11
OS_IOP_MMHG: 13
OS_IOP_MMHG: 11

## 2024-12-09 ASSESSMENT — REFRACTION_CURRENTRX
OS_SPHERE: +2.75
OD_OVR_VA: 20/
OS_OVR_VA: 20/
OD_CYLINDER: -0.25
OS_SPHERE: -0.50
OS_OVR_VA: 20/
OS_CYLINDER: -0.75
OD_SPHERE: +2.75
OD_AXIS: 054
OS_AXIS: 058
OS_VPRISM_DIRECTION: SV
OS_OVR_VA: 20/
OS_VPRISM_DIRECTION: SV
OD_SPHERE: +2.75
OD_VPRISM_DIRECTION: SV
OD_SPHERE: PLANO
OD_OVR_VA: 20/
OS_SPHERE: +2.75
OD_OVR_VA: 20/

## 2024-12-09 ASSESSMENT — REFRACTION_AUTOREFRACTION
OD_AXIS: 166
OD_CYLINDER: -2.50
OS_SPHERE: -0.50
OD_SPHERE: -2.75
OS_CYLINDER: -1.00
OS_AXIS: 123

## 2024-12-09 ASSESSMENT — PACHYMETRY
OD_CT_UM: 550
OS_CT_CORRECTION: 0
OS_CT_UM: 540
OD_CT_CORRECTION: -1

## 2024-12-09 ASSESSMENT — VISUAL ACUITY
OD_BCVA: 20/25
OS_BCVA: 20/HM

## 2024-12-09 ASSESSMENT — LID EXAM ASSESSMENTS
OS_BLEPHARITIS: LUL 1+
OD_BLEPHARITIS: RUL 1+

## 2024-12-09 ASSESSMENT — CONFRONTATIONAL VISUAL FIELD TEST (CVF)
OD_FINDINGS: FULL
OS_FINDINGS: FULL

## 2024-12-26 ENCOUNTER — NON-APPOINTMENT (OUTPATIENT)
Age: 88
End: 2024-12-26

## 2024-12-26 ENCOUNTER — APPOINTMENT (OUTPATIENT)
Dept: ELECTROPHYSIOLOGY | Facility: CLINIC | Age: 88
End: 2024-12-26

## 2024-12-26 PROCEDURE — 93298 REM INTERROG DEV EVAL SCRMS: CPT

## 2024-12-31 ENCOUNTER — NON-APPOINTMENT (OUTPATIENT)
Age: 88
End: 2024-12-31

## 2024-12-31 ENCOUNTER — APPOINTMENT (OUTPATIENT)
Dept: CARDIOLOGY | Facility: CLINIC | Age: 88
End: 2024-12-31
Payer: MEDICARE

## 2024-12-31 VITALS
WEIGHT: 137 LBS | DIASTOLIC BLOOD PRESSURE: 50 MMHG | BODY MASS INDEX: 24.58 KG/M2 | HEART RATE: 57 BPM | OXYGEN SATURATION: 97 % | HEIGHT: 62.5 IN | SYSTOLIC BLOOD PRESSURE: 100 MMHG

## 2024-12-31 DIAGNOSIS — Z95.0 PRESENCE OF CARDIAC PACEMAKER: ICD-10-CM

## 2024-12-31 DIAGNOSIS — I50.20 UNSPECIFIED SYSTOLIC (CONGESTIVE) HEART FAILURE: ICD-10-CM

## 2024-12-31 DIAGNOSIS — I44.2 ATRIOVENTRICULAR BLOCK, COMPLETE: ICD-10-CM

## 2024-12-31 DIAGNOSIS — I48.92 UNSPECIFIED ATRIAL FLUTTER: ICD-10-CM

## 2024-12-31 DIAGNOSIS — I25.10 ATHEROSCLEROTIC HEART DISEASE OF NATIVE CORONARY ARTERY W/OUT ANGINA PECTORIS: ICD-10-CM

## 2024-12-31 PROCEDURE — 99215 OFFICE O/P EST HI 40 MIN: CPT

## 2024-12-31 PROCEDURE — G2211 COMPLEX E/M VISIT ADD ON: CPT

## 2024-12-31 PROCEDURE — 93000 ELECTROCARDIOGRAM COMPLETE: CPT

## 2025-01-01 ENCOUNTER — EMERGENCY (EMERGENCY)
Facility: HOSPITAL | Age: 89
LOS: 1 days | End: 2025-01-01
Attending: STUDENT IN AN ORGANIZED HEALTH CARE EDUCATION/TRAINING PROGRAM
Payer: SELF-PAY

## 2025-01-01 DIAGNOSIS — Z95.1 PRESENCE OF AORTOCORONARY BYPASS GRAFT: Chronic | ICD-10-CM

## 2025-01-01 DIAGNOSIS — Z95.5 PRESENCE OF CORONARY ANGIOPLASTY IMPLANT AND GRAFT: Chronic | ICD-10-CM

## 2025-01-01 PROCEDURE — 99285 EMERGENCY DEPT VISIT HI MDM: CPT | Mod: 25

## 2025-01-01 PROCEDURE — 99291 CRITICAL CARE FIRST HOUR: CPT

## 2025-01-01 PROCEDURE — 92950 HEART/LUNG RESUSCITATION CPR: CPT

## 2025-01-30 ENCOUNTER — APPOINTMENT (OUTPATIENT)
Dept: ELECTROPHYSIOLOGY | Facility: CLINIC | Age: 89
End: 2025-01-30
Payer: MEDICARE

## 2025-01-30 ENCOUNTER — NON-APPOINTMENT (OUTPATIENT)
Age: 89
End: 2025-01-30

## 2025-01-30 PROCEDURE — 93298 REM INTERROG DEV EVAL SCRMS: CPT

## 2025-02-25 ENCOUNTER — OFFICE (OUTPATIENT)
Dept: URBAN - METROPOLITAN AREA CLINIC 115 | Facility: CLINIC | Age: OVER 89
Setting detail: OPHTHALMOLOGY
End: 2025-02-25
Payer: COMMERCIAL

## 2025-02-25 DIAGNOSIS — H40.1122: ICD-10-CM

## 2025-02-25 DIAGNOSIS — H02.012: ICD-10-CM

## 2025-02-25 DIAGNOSIS — H01.001: ICD-10-CM

## 2025-02-25 DIAGNOSIS — H40.1113: ICD-10-CM

## 2025-02-25 DIAGNOSIS — H01.004: ICD-10-CM

## 2025-02-25 PROCEDURE — 99213 OFFICE O/P EST LOW 20 MIN: CPT | Mod: 25 | Performed by: OPHTHALMOLOGY

## 2025-02-25 PROCEDURE — 67820 REVISE EYELASHES: CPT | Mod: E4 | Performed by: OPHTHALMOLOGY

## 2025-02-25 ASSESSMENT — REFRACTION_CURRENTRX
OD_OVR_VA: 20/
OD_VPRISM_DIRECTION: SV
OS_OVR_VA: 20/
OS_SPHERE: -0.50
OD_OVR_VA: 20/
OS_CYLINDER: -0.75
OD_OVR_VA: 20/
OD_SPHERE: +2.75
OS_VPRISM_DIRECTION: SV
OS_SPHERE: +2.75
OD_SPHERE: PLANO
OS_VPRISM_DIRECTION: SV
OD_AXIS: 054
OD_SPHERE: +2.75
OD_CYLINDER: -0.25
OS_SPHERE: +2.75
OS_OVR_VA: 20/
OS_OVR_VA: 20/
OS_AXIS: 058

## 2025-02-25 ASSESSMENT — REFRACTION_MANIFEST
OS_AXIS: 120
OD_SPHERE: PL
OS_SPHERE: -0.75
OD_SPHERE: BAL
OS_CYLINDER: -0.75
OS_VA1: 20/30+
OS_AXIS: 120
OD_VA1: 20/FC
OS_VA1: 20/25
OS_SPHERE: PL
OS_CYLINDER: -1.00

## 2025-02-25 ASSESSMENT — TONOMETRY
OS_IOP_MMHG: 14
OD_IOP_MMHG: 11

## 2025-02-25 ASSESSMENT — VISUAL ACUITY
OD_BCVA: 20/30-1
OS_BCVA: CF @ 1FT

## 2025-02-25 ASSESSMENT — PACHYMETRY
OD_CT_UM: 550
OD_CT_CORRECTION: -1
OS_CT_CORRECTION: 0
OS_CT_UM: 540

## 2025-02-25 ASSESSMENT — REFRACTION_AUTOREFRACTION
OD_AXIS: 154
OS_SPHERE: -0.75
OD_CYLINDER: -2.50
OS_AXIS: 123
OD_SPHERE: -2.25
OS_CYLINDER: -0.75

## 2025-02-25 ASSESSMENT — LID EXAM ASSESSMENTS
OD_BLEPHARITIS: RUL 1+
OS_BLEPHARITIS: LUL 1+
OD_TRICHIASIS: RLL 1+ 2+

## 2025-02-25 ASSESSMENT — CONFRONTATIONAL VISUAL FIELD TEST (CVF)
OS_FINDINGS: FULL
OD_FINDINGS: FULL

## 2025-03-06 ENCOUNTER — APPOINTMENT (OUTPATIENT)
Dept: ELECTROPHYSIOLOGY | Facility: CLINIC | Age: 89
End: 2025-03-06
Payer: MEDICARE

## 2025-03-06 ENCOUNTER — NON-APPOINTMENT (OUTPATIENT)
Age: 89
End: 2025-03-06

## 2025-03-06 PROCEDURE — 93298 REM INTERROG DEV EVAL SCRMS: CPT

## 2025-04-10 ENCOUNTER — APPOINTMENT (OUTPATIENT)
Dept: ELECTROPHYSIOLOGY | Facility: CLINIC | Age: 89
End: 2025-04-10
Payer: MEDICARE

## 2025-04-10 ENCOUNTER — NON-APPOINTMENT (OUTPATIENT)
Age: 89
End: 2025-04-10

## 2025-04-10 PROCEDURE — 93298 REM INTERROG DEV EVAL SCRMS: CPT

## 2025-04-28 ENCOUNTER — NON-APPOINTMENT (OUTPATIENT)
Age: 89
End: 2025-04-28

## 2025-04-30 ENCOUNTER — APPOINTMENT (OUTPATIENT)
Dept: CARDIOLOGY | Facility: CLINIC | Age: 89
End: 2025-04-30
Payer: MEDICARE

## 2025-04-30 ENCOUNTER — NON-APPOINTMENT (OUTPATIENT)
Age: 89
End: 2025-04-30

## 2025-04-30 VITALS
BODY MASS INDEX: 24.04 KG/M2 | HEART RATE: 54 BPM | DIASTOLIC BLOOD PRESSURE: 52 MMHG | OXYGEN SATURATION: 98 % | HEIGHT: 62.5 IN | WEIGHT: 134 LBS | SYSTOLIC BLOOD PRESSURE: 105 MMHG

## 2025-04-30 DIAGNOSIS — I44.2 ATRIOVENTRICULAR BLOCK, COMPLETE: ICD-10-CM

## 2025-04-30 DIAGNOSIS — I48.92 UNSPECIFIED ATRIAL FLUTTER: ICD-10-CM

## 2025-04-30 DIAGNOSIS — I50.20 UNSPECIFIED SYSTOLIC (CONGESTIVE) HEART FAILURE: ICD-10-CM

## 2025-04-30 DIAGNOSIS — I77.810 THORACIC AORTIC ECTASIA: ICD-10-CM

## 2025-04-30 DIAGNOSIS — Z95.0 PRESENCE OF CARDIAC PACEMAKER: ICD-10-CM

## 2025-04-30 DIAGNOSIS — I25.10 ATHEROSCLEROTIC HEART DISEASE OF NATIVE CORONARY ARTERY W/OUT ANGINA PECTORIS: ICD-10-CM

## 2025-04-30 PROCEDURE — 99214 OFFICE O/P EST MOD 30 MIN: CPT

## 2025-04-30 PROCEDURE — 93000 ELECTROCARDIOGRAM COMPLETE: CPT

## 2025-04-30 PROCEDURE — G2211 COMPLEX E/M VISIT ADD ON: CPT

## 2025-04-30 PROCEDURE — 93306 TTE W/DOPPLER COMPLETE: CPT

## 2025-04-30 RX ORDER — DABIGATRAN ETEXILATE 110 MG/1
110 CAPSULE ORAL
Qty: 180 | Refills: 3 | Status: ACTIVE | COMMUNITY
Start: 2025-04-30 | End: 1900-01-01

## 2025-05-05 ENCOUNTER — APPOINTMENT (OUTPATIENT)
Dept: ELECTROPHYSIOLOGY | Facility: CLINIC | Age: 89
End: 2025-05-05
Payer: MEDICARE

## 2025-05-05 VITALS
HEART RATE: 57 BPM | HEIGHT: 62.5 IN | DIASTOLIC BLOOD PRESSURE: 68 MMHG | OXYGEN SATURATION: 98 % | SYSTOLIC BLOOD PRESSURE: 106 MMHG | WEIGHT: 132 LBS | BODY MASS INDEX: 23.68 KG/M2

## 2025-05-05 PROCEDURE — 93000 ELECTROCARDIOGRAM COMPLETE: CPT | Mod: 59

## 2025-05-05 PROCEDURE — 99204 OFFICE O/P NEW MOD 45 MIN: CPT

## 2025-05-05 PROCEDURE — 93282 PRGRMG EVAL IMPLANTABLE DFB: CPT

## 2025-05-05 PROCEDURE — 99214 OFFICE O/P EST MOD 30 MIN: CPT

## 2025-05-12 ENCOUNTER — OFFICE (OUTPATIENT)
Dept: URBAN - METROPOLITAN AREA CLINIC 115 | Facility: CLINIC | Age: OVER 89
Setting detail: OPHTHALMOLOGY
End: 2025-05-12
Payer: COMMERCIAL

## 2025-05-12 DIAGNOSIS — H40.1113: ICD-10-CM

## 2025-05-12 DIAGNOSIS — H01.001: ICD-10-CM

## 2025-05-12 DIAGNOSIS — H02.012: ICD-10-CM

## 2025-05-12 DIAGNOSIS — H40.1122: ICD-10-CM

## 2025-05-12 DIAGNOSIS — H01.004: ICD-10-CM

## 2025-05-12 PROCEDURE — 99213 OFFICE O/P EST LOW 20 MIN: CPT | Mod: 25 | Performed by: OPHTHALMOLOGY

## 2025-05-12 PROCEDURE — 92133 CPTRZD OPH DX IMG PST SGM ON: CPT | Performed by: OPHTHALMOLOGY

## 2025-05-12 PROCEDURE — 67820 REVISE EYELASHES: CPT | Mod: E4 | Performed by: OPHTHALMOLOGY

## 2025-05-12 PROCEDURE — 92083 EXTENDED VISUAL FIELD XM: CPT | Performed by: OPHTHALMOLOGY

## 2025-05-12 ASSESSMENT — REFRACTION_MANIFEST
OD_SPHERE: BAL
OS_VA1: 20/30+
OD_SPHERE: PL
OS_SPHERE: -0.75
OS_AXIS: 120
OS_SPHERE: PL
OS_CYLINDER: -1.00
OS_AXIS: 120
OS_VA1: 20/25
OD_VA1: 20/FC
OS_CYLINDER: -0.75

## 2025-05-12 ASSESSMENT — REFRACTION_CURRENTRX
OD_OVR_VA: 20/
OD_AXIS: 054
OD_VPRISM_DIRECTION: SV
OD_CYLINDER: -0.25
OS_CYLINDER: -0.75
OD_OVR_VA: 20/
OS_OVR_VA: 20/
OS_OVR_VA: 20/
OD_SPHERE: +2.75
OS_VPRISM_DIRECTION: SV
OS_SPHERE: -0.50
OS_OVR_VA: 20/
OS_AXIS: 058
OD_OVR_VA: 20/
OS_SPHERE: +2.75
OS_SPHERE: +2.75
OD_SPHERE: PLANO
OS_VPRISM_DIRECTION: SV
OD_SPHERE: +2.75

## 2025-05-12 ASSESSMENT — LID EXAM ASSESSMENTS
OD_TRICHIASIS: RLL 1+ 2+
OS_BLEPHARITIS: LUL 1+
OD_BLEPHARITIS: RUL 1+

## 2025-05-12 ASSESSMENT — VISUAL ACUITY
OD_BCVA: 20/40-1
OS_BCVA: CF @ 2FT

## 2025-05-12 ASSESSMENT — REFRACTION_AUTOREFRACTION
OD_AXIS: 155
OD_CYLINDER: -2.25
OS_SPHERE: -0.25
OD_SPHERE: -2.50
OS_CYLINDER: -1.25
OS_AXIS: 112

## 2025-05-12 ASSESSMENT — PACHYMETRY
OD_CT_UM: 550
OD_CT_CORRECTION: -1
OS_CT_UM: 540
OS_CT_CORRECTION: 0

## 2025-05-12 ASSESSMENT — TONOMETRY
OD_IOP_MMHG: 15
OS_IOP_MMHG: 15
OD_IOP_MMHG: 15
OS_IOP_MMHG: 16

## 2025-05-12 ASSESSMENT — CONFRONTATIONAL VISUAL FIELD TEST (CVF)
OS_FINDINGS: FULL
OD_FINDINGS: FULL

## 2025-06-09 ENCOUNTER — APPOINTMENT (OUTPATIENT)
Dept: ELECTROPHYSIOLOGY | Facility: CLINIC | Age: 89
End: 2025-06-09
Payer: MEDICARE

## 2025-06-09 ENCOUNTER — NON-APPOINTMENT (OUTPATIENT)
Age: 89
End: 2025-06-09

## 2025-06-09 PROCEDURE — 93298 REM INTERROG DEV EVAL SCRMS: CPT

## 2025-07-07 NOTE — ED PROVIDER NOTE - CLINICAL SUMMARY MEDICAL DECISION MAKING FREE TEXT BOX
H&P as stated. patient arrived intubated with ongoing CPR. tube confirmed with glidescope. CPR continued for 40 minutes. pulse checks revealed v fib. patient received multiple rounds of epi, multiple shocks and lidocaine. calcium and mag was given. a pulse was briefly palpated long-term through our resuscitation but subsequently lost. after a total of approximately 80 minutes of CPR TOD was called at 1349.

## 2025-07-07 NOTE — ED PROVIDER NOTE - ATTENDING CONTRIBUTION TO CARE
Pt was BIBEMS as a cardiac arrest.  Pt's name is Arvind Lees. Hx of PPM, HFrEF, htn, hld, CAD s/p CABG and stents x5.  [facility].  911 called PD arrived initially and applied AED and shock was advised. EMS arrived after getting called at 1238 and intubated patient and commenced CPR.  Pt was found to be in VT and shocked and converted to VF. PT received four shocks and 6 rounds of epi. Pt also received 450 mg of amio by EMS.  Pt was asystole v. fine VF on arrival. Pt shocked twice, given calcium, magnesium, lidocaine and had brief return of pulse before quickly losing it again.  Pt received 3 more rounds of CPR with epi and remained PEA at that time. TOD 1349. Pt's family notified including wife Radha Lees.  Pastoral services offered and patient's family taken to see patient.

## 2025-07-07 NOTE — ED PROVIDER NOTE - NSCAREINITIATED _GEN_ER
Initiate Treatment: -fluticasone propionate 0.05 % topical cream \\nDays Supply: 30\\nSig: Apply to areas of pink red swollen skin  2 times a day until resolved. Do not use longer than 2 weeks.
Render In Strict Bullet Format?: No
Detail Level: Zone
Plan: - apply barrier (Vanicream, cetaphil,CeraVe, aquaphor) before swimming\\n- LaRoche Posay Lipikar AP+balm applied multiple times a day.
Teddy Elizabeth(Attending)

## 2025-07-07 NOTE — ED PROVIDER NOTE - OBJECTIVE STATEMENT
89y male [place]IB[place] [facility] [place] [facility] after a witnessed collapse. an AED was placed and CPR was performed until EMS arrived. per ems patient received shocks from the AED. patient was in v fib upon ems arrival. intubated by EMS. patient received 4 of epi as well as amiodarone en route. approximately 40 minutes of CPR prior to arrival.

## 2025-07-07 NOTE — ED ADULT TRIAGE NOTE - CHIEF COMPLAINT QUOTE
Pt BIBA, witnessed cardiac arrest in grocery store, intubated PTA on deborah, shocked 6x by EMS, brought to critical care

## 2025-07-07 NOTE — ED PROVIDER NOTE - PHYSICAL EXAMINATION
General: unresponsive. intubated   HEENT: Normocephalic, atraumatic. pupils 5mm and unresponsive  Neck:. Soft and supple.  Cardiac: tachycardic  Resp: bilateral breath sounds  Abd: Soft non-distended.  Skin: diaphoretic  Neuro: unresponsive

## 2025-07-11 ENCOUNTER — APPOINTMENT (OUTPATIENT)
Dept: ELECTROPHYSIOLOGY | Facility: CLINIC | Age: 89
End: 2025-07-11

## 2025-09-08 ENCOUNTER — APPOINTMENT (OUTPATIENT)
Dept: CARDIOLOGY | Facility: CLINIC | Age: 89
End: 2025-09-08